# Patient Record
Sex: FEMALE | Race: WHITE | NOT HISPANIC OR LATINO
[De-identification: names, ages, dates, MRNs, and addresses within clinical notes are randomized per-mention and may not be internally consistent; named-entity substitution may affect disease eponyms.]

---

## 2019-02-19 ENCOUNTER — APPOINTMENT (OUTPATIENT)
Dept: OBGYN | Facility: CLINIC | Age: 64
End: 2019-02-19
Payer: MEDICARE

## 2019-02-19 VITALS
HEIGHT: 64 IN | WEIGHT: 182 LBS | SYSTOLIC BLOOD PRESSURE: 116 MMHG | DIASTOLIC BLOOD PRESSURE: 68 MMHG | BODY MASS INDEX: 31.07 KG/M2

## 2019-02-19 DIAGNOSIS — Z01.419 ENCOUNTER FOR GYNECOLOGICAL EXAMINATION (GENERAL) (ROUTINE) W/OUT ABNORMAL FINDINGS: ICD-10-CM

## 2019-02-19 DIAGNOSIS — R92.2 INCONCLUSIVE MAMMOGRAM: ICD-10-CM

## 2019-02-19 PROCEDURE — G0101: CPT

## 2019-02-19 PROCEDURE — 82270 OCCULT BLOOD FECES: CPT

## 2019-02-20 PROBLEM — Z01.419 WELL WOMAN EXAM WITH ROUTINE GYNECOLOGICAL EXAM: Status: ACTIVE | Noted: 2019-02-20

## 2019-02-20 NOTE — HISTORY OF PRESENT ILLNESS
[Last Mammogram ___] : Last Mammogram was [unfilled] [Last Colonoscopy ___] : Last colonoscopy [unfilled] [Last Pap ___] : Last cervical pap smear was [unfilled]

## 2019-02-20 NOTE — PHYSICAL EXAM
[Awake] : awake [Alert] : alert [Soft] : soft [Oriented x3] : oriented to person, place, and time [No Lesions] : no genitalia lesions [Labia Majora] : labia major [Labia Minora] : labia minora [Normal] : clitoris [No Bleeding] : there was no active vaginal bleeding [Pap Obtained] : a Pap smear was performed [Normal Position] : in a normal position [Uterine Adnexae] : were not tender and not enlarged [Acute Distress] : no acute distress [Mass] : no breast mass [Nipple Discharge] : no nipple discharge [Axillary LAD] : no axillary lymphadenopathy [Tender] : non tender [Tenderness] : nontender [Enlarged ___ wks] : not enlarged [Mass ___ cm] : no uterine mass was palpated [Adnexa Tenderness] : were not tender [Ovarian Mass (___ Cm)] : there were no adnexal masses [Occult Blood] : occult blood test from digital rectal exam was negative [External Hemorrhoid] : no external hemorrhoids [FreeTextEntry5] : stenotic

## 2019-02-21 LAB — HPV HIGH+LOW RISK DNA PNL CVX: NOT DETECTED

## 2019-02-25 LAB — CYTOLOGY CVX/VAG DOC THIN PREP: NORMAL

## 2019-07-16 ENCOUNTER — FORM ENCOUNTER (OUTPATIENT)
Age: 64
End: 2019-07-16

## 2019-07-17 ENCOUNTER — OUTPATIENT (OUTPATIENT)
Dept: OUTPATIENT SERVICES | Facility: HOSPITAL | Age: 64
LOS: 1 days | End: 2019-07-17
Payer: MEDICARE

## 2019-07-17 ENCOUNTER — TRANSCRIPTION ENCOUNTER (OUTPATIENT)
Age: 64
End: 2019-07-17

## 2019-07-17 ENCOUNTER — APPOINTMENT (OUTPATIENT)
Dept: MRI IMAGING | Facility: IMAGING CENTER | Age: 64
End: 2019-07-17
Payer: MEDICARE

## 2019-07-17 ENCOUNTER — APPOINTMENT (OUTPATIENT)
Dept: NEUROSURGERY | Facility: CLINIC | Age: 64
End: 2019-07-17
Payer: MEDICARE

## 2019-07-17 DIAGNOSIS — D32.9 BENIGN NEOPLASM OF MENINGES, UNSPECIFIED: ICD-10-CM

## 2019-07-17 PROCEDURE — 70551 MRI BRAIN STEM W/O DYE: CPT

## 2019-07-17 PROCEDURE — 99204 OFFICE O/P NEW MOD 45 MIN: CPT

## 2019-07-17 PROCEDURE — 70551 MRI BRAIN STEM W/O DYE: CPT | Mod: 26

## 2019-07-18 VITALS
DIASTOLIC BLOOD PRESSURE: 94 MMHG | HEART RATE: 108 BPM | OXYGEN SATURATION: 99 % | SYSTOLIC BLOOD PRESSURE: 129 MMHG | RESPIRATION RATE: 16 BRPM

## 2019-07-18 NOTE — PHYSICAL EXAM
[General Appearance - Alert] : alert [General Appearance - In No Acute Distress] : in no acute distress [Oriented To Time, Place, And Person] : oriented to person, place, and time [Impaired Insight] : insight and judgment were intact [Affect] : the affect was normal [Person] : oriented to person [Place] : oriented to place [Time] : oriented to time [Short Term Intact] : short term memory intact [Remote Intact] : remote memory intact [Span Intact] : the attention span was normal [Concentration Intact] : normal concentrating ability [Fluency] : fluency intact [Comprehension] : comprehension intact [Current Events] : adequate knowledge of current events [Past History] : adequate knowledge of personal past history [Vocabulary] : adequate range of vocabulary [Cranial Nerves Optic (II)] : visual acuity intact bilaterally,  pupils equal round and reactive to light [Cranial Nerves Oculomotor (III)] : extraocular motion intact [Cranial Nerves Trigeminal (V)] : facial sensation intact symmetrically [Cranial Nerves Facial (VII)] : face symmetrical [Cranial Nerves Vestibulocochlear (VIII)] : hearing was intact bilaterally [Cranial Nerves Glossopharyngeal (IX)] : tongue and palate midline [Cranial Nerves Accessory (XI - Cranial And Spinal)] : head turning and shoulder shrug symmetric [Cranial Nerves Hypoglossal (XII)] : there was no tongue deviation with protrusion [Motor Strength] : muscle strength was normal in all four extremities [No Muscle Atrophy] : normal bulk in all four extremities [Motor Handedness Left-Handed] : the patient is left hand dominant [Sensation Tactile Decrease] : light touch was intact [Balance] : balance was intact [2+] : Patella left 2+ [Auscultation Breath Sounds / Voice Sounds] : lungs were clear to auscultation bilaterally [Heart Rate And Rhythm] : heart rate was normal and rhythm regular [Heart Sounds] : normal S1 and S2 [Heart Sounds Gallop] : no gallops [Murmurs] : no murmurs [Heart Sounds Pericardial Friction Rub] : no pericardial rub [Full Pulse] : the pedal pulses are present [Edema] : there was no peripheral edema [Bowel Sounds] : normal bowel sounds [Abdomen Soft] : soft [Abdomen Tenderness] : non-tender [Abdomen Mass (___ Cm)] : no abdominal mass palpated [No CVA Tenderness] : no ~M costovertebral angle tenderness [No Spinal Tenderness] : no spinal tenderness [Abnormal Walk] : normal gait [Nail Clubbing] : no clubbing  or cyanosis of the fingernails [Musculoskeletal - Swelling] : no joint swelling seen [Motor Tone] : muscle strength and tone were normal [Skin Color & Pigmentation] : normal skin color and pigmentation [Skin Turgor] : normal skin turgor [] : no rash [Past-pointing] : there was no past-pointing [Tremor] : no tremor present [FreeTextEntry1] : High mid cranial mass, nonmobile, nontender, hard, no soft areas.

## 2019-07-18 NOTE — DATA REVIEWED
[de-identified] : \par \par EXAM: MR BRAIN \par \par \par PROCEDURE DATE: 07/17/2019 \par \par \par \par INTERPRETATION: Non-contrast MRI of the brain. \par \par CLINICAL INDICATION: Follow-up meningioma \par \par TECHNIQUE: Multiplanar, multisequence MR images of the brain were obtained \par without the administration of intravenous contrast. \par \par COMPARISON: Brain MRI dated 2/14/2015 \par \par FINDINGS:There has been increase in size of a bifrontal midline meningioma \par now measuring 4.3 cm AP x 2.3 cm cc x 2.7 cm transverse as compared with 2.5 \par x 1.3 x 1.7 cm on the prior study. There is a left frontal craniotomy. There \par is new soft tissue along the outer table of the calvarium at the level of \par the craniotomy defect. A marker is noted to be present in this location \par corresponding to a palpable abnormality. The soft tissue measures 0.8 x 2.1 \par x 1.5 cm. Is uncertain whether this abnormal soft tissue is bridging through \par the craniotomy defect without contrast. \par \par The meningioma is located along the course of the superior sagittal sinus \par and there is no identifiable flow at the level of the meningioma. There is \par normal flow-related signal along the posterior superior sagittal sinus. \par \par There is similar edema and encephalomalacia within the left frontal lobe. \par \par No acute hemorrhage, infarct hydrocephalus or midline shift is present. \par \par The orbits, paranasal sinuses and tympanomastoid cavities are free of acute \par disease. \par \par Impression: \par \par Increasing size of midline frontal meningioma with involvement of the \par superior sagittal sinus. New soft tissue along the outer table of the \par calvarium at the level of the craniotomy defect likely represents a \par meningioma spreading through the craniotomy defect.. \par \par The results of this examination were discussed with Dr. Reyes at 4:26 PM \par on 7/17 2019 \par \par \par \par \par \par \par \par \par \par \par \par \par \par \par \par ALTA SANCHEZ M.D., ATTENDING RADIOLOGIST \par This document has been electronically signed. Jul 17 2019 4:27PM

## 2019-07-18 NOTE — REASON FOR VISIT
[New Patient Visit] : a new patient visit [Spouse] : spouse [Family Member] : family member [FreeTextEntry1] : recurrent meningioma

## 2019-07-18 NOTE — PLAN
[FreeTextEntry1] : Discussion:\par - Reviewed MRI results. Recurrent meningioma.\par - Treatment options reviewed with patient,  and daughter.\par 1. Observations, MRI with close follow-up with clinical exam. Patient currently asymptomatic reserving option to treat surgically if symptoms present.\par 2. Stereotactic radiosurgery- is not recommended as a solo treatment.\par 3. Recommended treatment is craniotomy for surgical resection of the meningioma. MRV preoperative will determine is GTR is a possibility. If venous blood flow is absent complete resection would be feasible and not addition treatment would most likely not be necessary. If venous blood flow is present, residual tumor tissue proximal to the vein would be left as to not compromise circulation. This tissue would then be treated with Gamma knife approximately 6 weeks post-operative to provide long term tumor control.\par Surgical experience was reviewed. PST, medical clearance and imaging was reviewed. LOS approximately 5 days with discharge to inpatient rehab for several weeks or possibly home. This would be determined after surgery. Preoperative imaging synaptiv, functional MRI and MRV will be coordinated for surgical planning.\par Risks, benefits and alternatives were discussed at length. All questions were answered.\par Contact information provided for any additional questions.\par Patient is cleared without limitations for travel to [facility] next week for a two week vacation.

## 2019-07-18 NOTE — HISTORY OF PRESENT ILLNESS
[> 3 months] : more  than 3 months [FreeTextEntry1] : "bump on the top of head that is getting larger." [de-identified] : Patient with resection of a midline frontal meningioma 11/11/2003 by Dr. KIRILL Davis.\par She had recovered well and had been followed radiographically for 12 years without significant growth.\par Last MRI 2/14/2015. MRI 7/17/19 with increased size of meningioma eroding through the calvarium.\par Patient has been asymptomatic. Patient is left handed. She denies headaches, dizziness, weakness, no speech or language difficulties. She does not have any symptoms like prior to the original surgery- seizures with speech arrest. Enlarging growth in the calvarium was noticed by the patient's hairdresser.\par She was seen by her dermatologist.\par \par She works full time as a , , adult children, she lives in a private home with stairs. She is independent in ADLs.\par \par PCP: Destiny Caldera- [place]\par Psychiatrist: Martinez Mcclellan- [place]\par \par \par

## 2019-08-12 ENCOUNTER — FORM ENCOUNTER (OUTPATIENT)
Age: 64
End: 2019-08-12

## 2019-08-13 ENCOUNTER — OUTPATIENT (OUTPATIENT)
Dept: OUTPATIENT SERVICES | Facility: HOSPITAL | Age: 64
LOS: 1 days | End: 2019-08-13
Payer: MEDICARE

## 2019-08-13 ENCOUNTER — APPOINTMENT (OUTPATIENT)
Dept: MRI IMAGING | Facility: HOSPITAL | Age: 64
End: 2019-08-13

## 2019-08-13 DIAGNOSIS — I67.1 CEREBRAL ANEURYSM, NONRUPTURED: ICD-10-CM

## 2019-08-13 DIAGNOSIS — R07.9 CHEST PAIN, UNSPECIFIED: ICD-10-CM

## 2019-08-13 DIAGNOSIS — D32.9 BENIGN NEOPLASM OF MENINGES, UNSPECIFIED: ICD-10-CM

## 2019-08-13 PROCEDURE — 70553 MRI BRAIN STEM W/O & W/DYE: CPT

## 2019-08-13 PROCEDURE — 70553 MRI BRAIN STEM W/O & W/DYE: CPT | Mod: 26

## 2019-08-13 PROCEDURE — A9585: CPT

## 2019-08-13 PROCEDURE — 70546 MR ANGIOGRAPH HEAD W/O&W/DYE: CPT

## 2019-08-13 PROCEDURE — 70555 FMRI BRAIN BY PHYS/PSYCH: CPT

## 2019-08-22 ENCOUNTER — APPOINTMENT (OUTPATIENT)
Dept: NEUROSURGERY | Facility: CLINIC | Age: 64
End: 2019-08-22
Payer: MEDICARE

## 2019-08-22 PROCEDURE — 99214 OFFICE O/P EST MOD 30 MIN: CPT

## 2019-09-05 ENCOUNTER — APPOINTMENT (OUTPATIENT)
Dept: NEUROSURGERY | Facility: CLINIC | Age: 64
End: 2019-09-05
Payer: MEDICARE

## 2019-09-05 VITALS
WEIGHT: 170 LBS | TEMPERATURE: 98 F | RESPIRATION RATE: 18 BRPM | BODY MASS INDEX: 29.02 KG/M2 | OXYGEN SATURATION: 99 % | DIASTOLIC BLOOD PRESSURE: 87 MMHG | HEIGHT: 64 IN | SYSTOLIC BLOOD PRESSURE: 124 MMHG | HEART RATE: 98 BPM

## 2019-09-05 PROCEDURE — 99213 OFFICE O/P EST LOW 20 MIN: CPT

## 2019-09-05 NOTE — PHYSICAL EXAM
[General Appearance - Alert] : alert [General Appearance - In No Acute Distress] : in no acute distress [Person] : oriented to person [Place] : oriented to place [Time] : oriented to time [Motor Strength] : muscle strength was normal in all four extremities [Involuntary Movements] : no involuntary movements were seen [Balance] : balance was intact [] : no respiratory distress [Abnormal Walk] : normal gait

## 2019-09-05 NOTE — REASON FOR VISIT
[New Patient Visit] : a new patient visit [Referred By: _________] : Patient was referred by JOSHUA [Spouse] : spouse

## 2019-09-09 NOTE — ASSESSMENT
[FreeTextEntry1] : Impression: 64yr old female with bifrontal midline meningioma \par \par Plan: \par Reviewed patients MRI brain which demonstrates the presence of a bifrontal meningioma \par Discussed the risks and benefits of cerebral angiography and embolization for the meningioma prior to surgical resection. \par Patient wishes to proceed with embolization September 23, 2019.

## 2019-09-09 NOTE — HISTORY OF PRESENT ILLNESS
[de-identified] : Macey Fajardo is a 64yr old female here for a new patient visit. She has a cerebral meningioma that was resect by Dr. Davis in 2003. She then started to see Dr. Reyes for the meningioma. A recent MRI brain done July 2019 noted the meningioma has returned and she is scheduled for surgical resection of the meningoma September 24, 2019. SHe is here to discusses cerebral angiography and embolization of the meningioma the day prior to her surgery. \par \par Forence Baraselli = PCP

## 2019-09-09 NOTE — RESULTS/DATA
[FreeTextEntry1] : EXAM: MR BRAIN WAW IC \par \par EXAM: MR BRAIN FUNCTIONAL W MD PHD \par \par EXAM: MR VENOGRAM BRAIN WAW IC \par \par \par PROCEDURE DATE: 08/13/2019 \par \par \par \par \par INTERPRETATION: \par CLINICAL INDICATION: Preop evaluation of midline frontal meningioma \par \par \par Magnetic resonance imaging of the brain was carried out with transaxial \par SPGR, FLAIR, fast spin echo T2 weighted images, axial susceptibility \par weighted series, diffusion weighted series and sagittal T1 weighted series \par on a 3.0 Letitia magnet. Post contrast axial, coronal and sagittal T1 weighted \par images were obtained. 7.5 cc of Gadavist were intravenously injected, 0 cc \par were discarded. Sagittal postcontrast MR venography was obtained. Images \par were obtained using the AMT protocol. \par \par Functional imaging was also obtained using tasks for motor as well as speech \par evaluation. \par \par Comparison is made with the prior MRI of 7/17/2019. \par \par There is no significant change since the prior examination. \par \par A large enhancing midline frontal meningioma is identified which extends \par through frontal calvarium to the subcutaneous tissues. This mass also \par extends into the mid portion of the superior sagittal sinus. The proximal \par and distal superior sagittal sinus are patent. The mass measures 4 cm in AP \par diameter x 2.8 cm transversely x 2.4 cm in craniocaudal diameter. There is \par mild encephalomalacia and gliosis in the high left parasagittal frontal \par region subjacent to a craniotomy suggesting prior postoperative changes. \par \par Although unchanged since the prior examination 7/17/2019, the mass is new \par compared with 3/17/2008 and is larger when compared with 2/14/2015. \par \par \par The ventricles and sulci are otherwise normal. No acute infarcts or \par hemorrhage are identified. Left after contrast infusion there is normal \par intracranial vascular enhancement. \par \par The right transverse sinus is dominant. The inferior sagittal sinus, \par internal cerebral veins, vein of Raphael, straight sinus, transverse sinuses, \par sigmoid sinuses and internal jugular veins are normal. Flow in the superior \par sagittal sinus proximal and distal to the meningioma is visualized. Cortical \par veins are patent proximal and distal to the mass. \par \par \par Functional MRI imaging was obtained with tasks for motor and speech \par identification. Motor tasks of finger tapping and foot motion demonstrate \par the motor strip which is posterior to the last. Speech tasks consisting of \par word generation, verb generation, sentence completion and object naming \par demonstrate bilateral frontal activation. \par \par \par \par \par Impression: Images obtained using the Synaptive protocol. Large anterior \par parasagittal meningioma measuring 4.0 x 2.8 x 2.4 cm in diameter extending \par through the superior sagittal sinus and calvarium into the subcutaneous soft \par tissues. The superior sagittal sinus is patent proximal and distal to the \par mass. \par \par Functional imaging as above. \par \par \par \par \par \par \par \par \par \par JOSE ZHANG M.D., ATTENDING RADIOLOGIST \par This document has been electronically signed. Aug 14 2019 5:55PM \par \par \par \par

## 2019-09-10 ENCOUNTER — OUTPATIENT (OUTPATIENT)
Dept: OUTPATIENT SERVICES | Facility: HOSPITAL | Age: 64
LOS: 1 days | End: 2019-09-10
Payer: MEDICARE

## 2019-09-10 VITALS
WEIGHT: 177.03 LBS | HEIGHT: 64 IN | OXYGEN SATURATION: 97 % | SYSTOLIC BLOOD PRESSURE: 118 MMHG | TEMPERATURE: 99 F | RESPIRATION RATE: 14 BRPM | HEART RATE: 94 BPM | DIASTOLIC BLOOD PRESSURE: 84 MMHG

## 2019-09-10 DIAGNOSIS — Z98.890 OTHER SPECIFIED POSTPROCEDURAL STATES: Chronic | ICD-10-CM

## 2019-09-10 DIAGNOSIS — Z29.9 ENCOUNTER FOR PROPHYLACTIC MEASURES, UNSPECIFIED: ICD-10-CM

## 2019-09-10 DIAGNOSIS — D32.9 BENIGN NEOPLASM OF MENINGES, UNSPECIFIED: ICD-10-CM

## 2019-09-10 DIAGNOSIS — Z01.818 ENCOUNTER FOR OTHER PREPROCEDURAL EXAMINATION: ICD-10-CM

## 2019-09-10 DIAGNOSIS — Z98.1 ARTHRODESIS STATUS: Chronic | ICD-10-CM

## 2019-09-10 DIAGNOSIS — F41.8 OTHER SPECIFIED ANXIETY DISORDERS: ICD-10-CM

## 2019-09-10 LAB
BLD GP AB SCN SERPL QL: NEGATIVE — SIGNIFICANT CHANGE UP
BUN SERPL-MCNC: 16 MG/DL — SIGNIFICANT CHANGE UP (ref 7–23)
CALCIUM SERPL-MCNC: 10 MG/DL — SIGNIFICANT CHANGE UP (ref 8.4–10.5)
CHLORIDE SERPL-SCNC: 101 MMOL/L — SIGNIFICANT CHANGE UP (ref 96–108)
CO2 SERPL-SCNC: 25 MMOL/L — SIGNIFICANT CHANGE UP (ref 22–31)
CREAT SERPL-MCNC: 1.11 MG/DL — SIGNIFICANT CHANGE UP (ref 0.5–1.3)
GLUCOSE SERPL-MCNC: 100 MG/DL — HIGH (ref 70–99)
HCT VFR BLD CALC: 42.8 % — SIGNIFICANT CHANGE UP (ref 34.5–45)
HGB BLD-MCNC: 13.7 G/DL — SIGNIFICANT CHANGE UP (ref 11.5–15.5)
MCHC RBC-ENTMCNC: 27.9 PG — SIGNIFICANT CHANGE UP (ref 27–34)
MCHC RBC-ENTMCNC: 32 GM/DL — SIGNIFICANT CHANGE UP (ref 32–36)
MCV RBC AUTO: 87.2 FL — SIGNIFICANT CHANGE UP (ref 80–100)
MRSA PCR RESULT.: SIGNIFICANT CHANGE UP
PLATELET # BLD AUTO: 298 K/UL — SIGNIFICANT CHANGE UP (ref 150–400)
POTASSIUM SERPL-MCNC: 3.8 MMOL/L — SIGNIFICANT CHANGE UP (ref 3.5–5.3)
POTASSIUM SERPL-SCNC: 3.8 MMOL/L — SIGNIFICANT CHANGE UP (ref 3.5–5.3)
RBC # BLD: 4.91 M/UL — SIGNIFICANT CHANGE UP (ref 3.8–5.2)
RBC # FLD: 12 % — SIGNIFICANT CHANGE UP (ref 10.3–14.5)
RH IG SCN BLD-IMP: POSITIVE — SIGNIFICANT CHANGE UP
S AUREUS DNA NOSE QL NAA+PROBE: SIGNIFICANT CHANGE UP
SODIUM SERPL-SCNC: 140 MMOL/L — SIGNIFICANT CHANGE UP (ref 135–145)
WBC # BLD: 4.95 K/UL — SIGNIFICANT CHANGE UP (ref 3.8–10.5)
WBC # FLD AUTO: 4.95 K/UL — SIGNIFICANT CHANGE UP (ref 3.8–10.5)

## 2019-09-10 PROCEDURE — 86850 RBC ANTIBODY SCREEN: CPT

## 2019-09-10 PROCEDURE — G0463: CPT

## 2019-09-10 PROCEDURE — 87641 MR-STAPH DNA AMP PROBE: CPT

## 2019-09-10 PROCEDURE — 80048 BASIC METABOLIC PNL TOTAL CA: CPT

## 2019-09-10 PROCEDURE — 85027 COMPLETE CBC AUTOMATED: CPT

## 2019-09-10 PROCEDURE — 87640 STAPH A DNA AMP PROBE: CPT

## 2019-09-10 PROCEDURE — 86900 BLOOD TYPING SEROLOGIC ABO: CPT

## 2019-09-10 PROCEDURE — 86901 BLOOD TYPING SEROLOGIC RH(D): CPT

## 2019-09-10 NOTE — H&P PST ADULT - PSYCHIATRIC COMMENTS
admitted to suicidal ideation last week after being told surgery was necessary, was seen by psychiatrist twice since last week, states she is not feeling suicidal at this time Goes to psychiatrist 1x/week and prn, last seen 9/9/19

## 2019-09-10 NOTE — H&P PST ADULT - HISTORY OF PRESENT ILLNESS
Mrs. Fajardo is a 64 year old woman with PMH HLD, thyroid nodule s/p thyroidectomy 1999, osteoarthritis s/p spinal fusion in 2003 and gel injections to both knees and brain hemangioma s/p resection in 2003 who noted swelling on top of her head and went to PCP then neurosurgeon now scheduled for cerebral angiography with pipeline stent on 9/23/19 followed by midline craniotomy for tumor resection the following day on 9/24/19.  She denies headaches, dizziness or pain upon palpation.  She is very anxious about the procedure and felt suicidal last week but has been seen by her psychiatrist twice since and will continue regularly scheduled appointments with her. Mrs. Fajardo is a 64 year old woman with PMH HLD, thyroid nodule s/p thyroidectomy 1999, osteoarthritis s/p spinal fusion in 2003 and gel injections to both knees and brain hemangioma s/p resection in 2003 who noted swelling on top of her head and went to PCP then neurosurgeon now scheduled for cerebral angiography with pipeline stent on 9/23/19 followed by midline craniotomy for tumor resection the following day on 9/24/19.  She denies headaches, dizziness or pain upon palpation.  She is very anxious about the procedure and felt suicidal last week but has been seen by her psychiatrist twice since and will continue regularly scheduled appointments with her.    9/11/19  Spoke with Dr. Mcclellan, her psychiatrist re: letter stating that she is stable for planned procedure on 9/23/19, he will fax over letter.

## 2019-09-10 NOTE — H&P PST ADULT - HEALTH CARE MAINTENANCE
Flu vaccine annually  Pneum vaccine up to date  Shingles vaccine done  Mammo 2/2019  Pap Smear 2/2019

## 2019-09-10 NOTE — H&P PST ADULT - ASSESSMENT
CAPRINI SCORE [CLOT]    AGE RELATED RISK FACTORS                                                       MOBILITY RELATED FACTORS  [ ] Age 41-60 years                                            (1 Point)                  [ ] Bed rest                                                        (1 Point)  [ x] Age: 61-74 years                                           (2 Points)                 [ ] Plaster cast                                                   (2 Points)  [ ] Age= 75 years                                              (3 Points)                 [ ] Bed bound for more than 72 hours                 (2 Points)    DISEASE RELATED RISK FACTORS                                               GENDER SPECIFIC FACTORS  [ ] Edema in the lower extremities                       (1 Point)                  [ ] Pregnancy                                                     (1 Point)  [ ] Varicose veins                                               (1 Point)                  [ ] Post-partum < 6 weeks                                   (1 Point)             [x ] BMI > 25 Kg/m2                                            (1 Point)                  [ ] Hormonal therapy  or oral contraception          (1 Point)                 [ ] Sepsis (in the previous month)                        (1 Point)                  [ ] History of pregnancy complications                 (1 point)  [ ] Pneumonia or serious lung disease                                               [ ] Unexplained or recurrent                     (1 Point)           (in the previous month)                               (1 Point)  [ ] Abnormal pulmonary function test                     (1 Point)                 SURGERY RELATED RISK FACTORS  [ ] Acute myocardial infarction                              (1 Point)                 [ ]  Section                                             (1 Point)  [ ] Congestive heart failure (in the previous month)  (1 Point)               [ ] Minor surgery                                                  (1 Point)   [ ] Inflammatory bowel disease                             (1 Point)                 [ ] Arthroscopic surgery                                        (2 Points)  [ ] Central venous access                                      (2 Points)                [x ] General surgery lasting more than 45 minutes   (2 Points)       [ ] Stroke (in the previous month)                          (5 Points)               [ ] Elective arthroplasty                                         (5 Points)                                                                                                                                               HEMATOLOGY RELATED FACTORS                                                 TRAUMA RELATED RISK FACTORS  [ ] Prior episodes of VTE                                     (3 Points)                 [ ] Fracture of the hip, pelvis, or leg                       (5 Points)  [ ] Positive family history for VTE                         (3 Points)                 [ ] Acute spinal cord injury (in the previous month)  (5 Points)  [ ] Prothrombin 57040 A                                     (3 Points)                 [ ] Paralysis  (less than 1 month)                             (5 Points)  [ ] Factor V Leiden                                             (3 Points)                  [ ] Multiple Trauma within 1 month                        (5 Points)  [ ] Lupus anticoagulants                                     (3 Points)                                                           [ ] Anticardiolipin antibodies                               (3 Points)                                                       [ ] High homocysteine in the blood                      (3 Points)                                             [ ] Other congenital or acquired thrombophilia      (3 Points)                                                [ ] Heparin induced thrombocytopenia                  (3 Points)                                          Total Score [   5       ]

## 2019-09-10 NOTE — H&P PST ADULT - NSICDXPASTMEDICALHX_GEN_ALL_CORE_FT
PAST MEDICAL HISTORY:  Depression with anxiety     HLD (hyperlipidemia)     OA (osteoarthritis)     Thyroid nodule 1999

## 2019-09-10 NOTE — H&P PST ADULT - NSICDXPASTSURGICALHX_GEN_ALL_CORE_FT
PAST SURGICAL HISTORY:  S/P lumbar spinal fusion 2003    S/P resection of meningioma 2003    S/P thyroidectomy 1999 for nodule

## 2019-09-10 NOTE — H&P PST ADULT - NSICDXFAMILYHX_GEN_ALL_CORE_FT
FAMILY HISTORY:  FH: lung cancer, Mother  in her 50s  FH: premature coronary heart disease, Father  at 55 from MI

## 2019-09-10 NOTE — H&P PST ADULT - NSICDXPROBLEM_GEN_ALL_CORE_FT
PROBLEM DIAGNOSES  Problem: Benign neoplasm of meninges, unspecified  Assessment and Plan: Scheduled for Pipeline embolization 9/23, scheduled for Midline crani for tumor resection 9/24 with Dr. Reyes.  Preop instructions given.  Instructed to continue medications as prescribed, may take day of procedure with small sips of water.  Medical evaluation pending 9/13/19  Chlorhexidine to affected area preop    Problem: Prophylactic measure  Assessment and Plan: The Caprini score indicates this patient is at risk for a VTE event (score 3-5).  Most surgical patients in this group would benefit from pharmacologic prophylaxis.  The surgical team will determine the balance between VTE risk and bleeding risk      Problem: Depression with anxiety  Assessment and Plan: Instructed to continue with therapy sessions with Psychiatrist and to take meds as prescribed.

## 2019-09-10 NOTE — H&P PST ADULT - NSANTHOSAYNRD_GEN_A_CORE
No. STEFANY screening performed.  STOP BANG Legend: 0-2 = LOW Risk; 3-4 = INTERMEDIATE Risk; 5-8 = HIGH Risk

## 2019-09-18 PROBLEM — M19.90 UNSPECIFIED OSTEOARTHRITIS, UNSPECIFIED SITE: Chronic | Status: ACTIVE | Noted: 2019-09-10

## 2019-09-18 PROBLEM — E04.1 NONTOXIC SINGLE THYROID NODULE: Chronic | Status: ACTIVE | Noted: 2019-09-10

## 2019-09-18 PROBLEM — E78.5 HYPERLIPIDEMIA, UNSPECIFIED: Chronic | Status: ACTIVE | Noted: 2019-09-10

## 2019-09-18 PROBLEM — F41.8 OTHER SPECIFIED ANXIETY DISORDERS: Chronic | Status: ACTIVE | Noted: 2019-09-10

## 2019-09-23 ENCOUNTER — TRANSCRIPTION ENCOUNTER (OUTPATIENT)
Age: 64
End: 2019-09-23

## 2019-09-23 ENCOUNTER — APPOINTMENT (OUTPATIENT)
Dept: NEUROSURGERY | Facility: HOSPITAL | Age: 64
End: 2019-09-23
Payer: MEDICARE

## 2019-09-23 ENCOUNTER — INPATIENT (INPATIENT)
Facility: HOSPITAL | Age: 64
LOS: 8 days | Discharge: HOME | DRG: 25 | End: 2019-10-02
Attending: NEUROLOGICAL SURGERY | Admitting: NEUROLOGICAL SURGERY
Payer: MEDICARE

## 2019-09-23 VITALS — HEART RATE: 80 BPM | RESPIRATION RATE: 16 BRPM | OXYGEN SATURATION: 95 % | TEMPERATURE: 97 F

## 2019-09-23 DIAGNOSIS — Z98.890 OTHER SPECIFIED POSTPROCEDURAL STATES: Chronic | ICD-10-CM

## 2019-09-23 DIAGNOSIS — D32.9 BENIGN NEOPLASM OF MENINGES, UNSPECIFIED: ICD-10-CM

## 2019-09-23 DIAGNOSIS — Z98.1 ARTHRODESIS STATUS: Chronic | ICD-10-CM

## 2019-09-23 LAB
ALBUMIN SERPL ELPH-MCNC: 4.1 G/DL — SIGNIFICANT CHANGE UP (ref 3.3–5)
ALP SERPL-CCNC: 73 U/L — SIGNIFICANT CHANGE UP (ref 40–120)
ALT FLD-CCNC: 18 U/L — SIGNIFICANT CHANGE UP (ref 10–45)
ANION GAP SERPL CALC-SCNC: 9 MMOL/L — SIGNIFICANT CHANGE UP (ref 5–17)
APTT BLD: 140.5 SEC — CRITICAL HIGH (ref 27.5–36.3)
APTT BLD: 33.9 SEC — SIGNIFICANT CHANGE UP (ref 27.5–36.3)
AST SERPL-CCNC: 16 U/L — SIGNIFICANT CHANGE UP (ref 10–40)
BILIRUB DIRECT SERPL-MCNC: <0.1 MG/DL — SIGNIFICANT CHANGE UP (ref 0–0.2)
BILIRUB INDIRECT FLD-MCNC: >0.1 MG/DL — LOW (ref 0.2–1)
BILIRUB SERPL-MCNC: 0.2 MG/DL — SIGNIFICANT CHANGE UP (ref 0.2–1.2)
BUN SERPL-MCNC: 15 MG/DL — SIGNIFICANT CHANGE UP (ref 7–23)
CALCIUM SERPL-MCNC: 8.9 MG/DL — SIGNIFICANT CHANGE UP (ref 8.4–10.5)
CHLORIDE SERPL-SCNC: 103 MMOL/L — SIGNIFICANT CHANGE UP (ref 96–108)
CO2 SERPL-SCNC: 23 MMOL/L — SIGNIFICANT CHANGE UP (ref 22–31)
CREAT SERPL-MCNC: 1.04 MG/DL — SIGNIFICANT CHANGE UP (ref 0.5–1.3)
GLUCOSE SERPL-MCNC: 119 MG/DL — HIGH (ref 70–99)
HBA1C BLD-MCNC: 5.5 % — SIGNIFICANT CHANGE UP (ref 4–5.6)
HCT VFR BLD CALC: 39.7 % — SIGNIFICANT CHANGE UP (ref 34.5–45)
HGB BLD-MCNC: 13.4 G/DL — SIGNIFICANT CHANGE UP (ref 11.5–15.5)
INR BLD: 1.04 RATIO — SIGNIFICANT CHANGE UP (ref 0.88–1.16)
INR BLD: 1.11 RATIO — SIGNIFICANT CHANGE UP (ref 0.88–1.16)
MAGNESIUM SERPL-MCNC: 1.9 MG/DL — SIGNIFICANT CHANGE UP (ref 1.6–2.6)
MCHC RBC-ENTMCNC: 29.5 PG — SIGNIFICANT CHANGE UP (ref 27–34)
MCHC RBC-ENTMCNC: 33.6 GM/DL — SIGNIFICANT CHANGE UP (ref 32–36)
MCV RBC AUTO: 87.9 FL — SIGNIFICANT CHANGE UP (ref 80–100)
PHOSPHATE SERPL-MCNC: 1.7 MG/DL — LOW (ref 2.5–4.5)
PLATELET # BLD AUTO: 251 K/UL — SIGNIFICANT CHANGE UP (ref 150–400)
POTASSIUM SERPL-MCNC: 3.7 MMOL/L — SIGNIFICANT CHANGE UP (ref 3.5–5.3)
POTASSIUM SERPL-SCNC: 3.7 MMOL/L — SIGNIFICANT CHANGE UP (ref 3.5–5.3)
PROT SERPL-MCNC: 6.4 G/DL — SIGNIFICANT CHANGE UP (ref 6–8.3)
PROTHROM AB SERPL-ACNC: 11.9 SEC — SIGNIFICANT CHANGE UP (ref 10–12.9)
PROTHROM AB SERPL-ACNC: 12.7 SEC — SIGNIFICANT CHANGE UP (ref 10–12.9)
RBC # BLD: 4.52 M/UL — SIGNIFICANT CHANGE UP (ref 3.8–5.2)
RBC # FLD: 11.5 % — SIGNIFICANT CHANGE UP (ref 10.3–14.5)
SODIUM SERPL-SCNC: 135 MMOL/L — SIGNIFICANT CHANGE UP (ref 135–145)
WBC # BLD: 6.6 K/UL — SIGNIFICANT CHANGE UP (ref 3.8–10.5)
WBC # FLD AUTO: 6.6 K/UL — SIGNIFICANT CHANGE UP (ref 3.8–10.5)

## 2019-09-23 PROCEDURE — 36227 PLACE CATH XTRNL CAROTID: CPT | Mod: 50

## 2019-09-23 PROCEDURE — 61624 TCAT PERM OCCLS/EMBOLJ CNS: CPT

## 2019-09-23 PROCEDURE — 75894 X-RAYS TRANSCATH THERAPY: CPT | Mod: 26

## 2019-09-23 PROCEDURE — 70496 CT ANGIOGRAPHY HEAD: CPT | Mod: 26

## 2019-09-23 PROCEDURE — 75898 FOLLOW-UP ANGIOGRAPHY: CPT | Mod: 26

## 2019-09-23 PROCEDURE — 99292 CRITICAL CARE ADDL 30 MIN: CPT

## 2019-09-23 PROCEDURE — 99291 CRITICAL CARE FIRST HOUR: CPT

## 2019-09-23 PROCEDURE — 36224 PLACE CATH CAROTD ART: CPT | Mod: 50

## 2019-09-23 RX ORDER — ARIPIPRAZOLE 15 MG/1
2 TABLET ORAL DAILY
Refills: 0 | Status: DISCONTINUED | OUTPATIENT
Start: 2019-09-23 | End: 2019-09-23

## 2019-09-23 RX ORDER — ATORVASTATIN CALCIUM 80 MG/1
40 TABLET, FILM COATED ORAL AT BEDTIME
Refills: 0 | Status: DISCONTINUED | OUTPATIENT
Start: 2019-09-23 | End: 2019-10-02

## 2019-09-23 RX ORDER — CHLORHEXIDINE GLUCONATE 213 G/1000ML
1 SOLUTION TOPICAL
Refills: 0 | Status: DISCONTINUED | OUTPATIENT
Start: 2019-09-23 | End: 2019-09-26

## 2019-09-23 RX ORDER — INFLUENZA VIRUS VACCINE 15; 15; 15; 15 UG/.5ML; UG/.5ML; UG/.5ML; UG/.5ML
0.5 SUSPENSION INTRAMUSCULAR ONCE
Refills: 0 | Status: COMPLETED | OUTPATIENT
Start: 2019-09-23 | End: 2019-10-02

## 2019-09-23 RX ORDER — BUPROPION HYDROCHLORIDE 150 MG/1
300 TABLET, EXTENDED RELEASE ORAL DAILY
Refills: 0 | Status: DISCONTINUED | OUTPATIENT
Start: 2019-09-23 | End: 2019-09-23

## 2019-09-23 RX ORDER — BUPROPION HYDROCHLORIDE 150 MG/1
150 TABLET, EXTENDED RELEASE ORAL DAILY
Refills: 0 | Status: DISCONTINUED | OUTPATIENT
Start: 2019-09-23 | End: 2019-09-23

## 2019-09-23 RX ORDER — SODIUM CHLORIDE 9 MG/ML
1000 INJECTION INTRAMUSCULAR; INTRAVENOUS; SUBCUTANEOUS
Refills: 0 | Status: DISCONTINUED | OUTPATIENT
Start: 2019-09-23 | End: 2019-09-24

## 2019-09-23 RX ORDER — ARIPIPRAZOLE 15 MG/1
2 TABLET ORAL EVERY 24 HOURS
Refills: 0 | Status: DISCONTINUED | OUTPATIENT
Start: 2019-09-23 | End: 2019-10-02

## 2019-09-23 RX ORDER — CLONAZEPAM 1 MG
1 TABLET ORAL THREE TIMES A DAY
Refills: 0 | Status: DISCONTINUED | OUTPATIENT
Start: 2019-09-23 | End: 2019-09-26

## 2019-09-23 RX ORDER — DEXAMETHASONE 0.5 MG/5ML
4 ELIXIR ORAL EVERY 6 HOURS
Refills: 0 | Status: DISCONTINUED | OUTPATIENT
Start: 2019-09-23 | End: 2019-09-24

## 2019-09-23 RX ORDER — FOLIC ACID 0.8 MG
1 TABLET ORAL DAILY
Refills: 0 | Status: DISCONTINUED | OUTPATIENT
Start: 2019-09-23 | End: 2019-10-02

## 2019-09-23 RX ORDER — DEXAMETHASONE 0.5 MG/5ML
10 ELIXIR ORAL ONCE
Refills: 0 | Status: COMPLETED | OUTPATIENT
Start: 2019-09-23 | End: 2019-09-23

## 2019-09-23 RX ORDER — BUPROPION HYDROCHLORIDE 150 MG/1
300 TABLET, EXTENDED RELEASE ORAL EVERY 24 HOURS
Refills: 0 | Status: DISCONTINUED | OUTPATIENT
Start: 2019-09-23 | End: 2019-10-02

## 2019-09-23 RX ORDER — LEVOTHYROXINE SODIUM 125 MCG
100 TABLET ORAL DAILY
Refills: 0 | Status: DISCONTINUED | OUTPATIENT
Start: 2019-09-23 | End: 2019-09-27

## 2019-09-23 RX ORDER — DEXAMETHASONE 0.5 MG/5ML
10 ELIXIR ORAL ONCE
Refills: 0 | Status: DISCONTINUED | OUTPATIENT
Start: 2019-09-23 | End: 2019-09-23

## 2019-09-23 RX ADMIN — Medication 1 MILLIGRAM(S): at 21:34

## 2019-09-23 RX ADMIN — Medication 4 MILLIGRAM(S): at 23:42

## 2019-09-23 RX ADMIN — Medication 102 MILLIGRAM(S): at 19:02

## 2019-09-23 RX ADMIN — SODIUM CHLORIDE 70 MILLILITER(S): 9 INJECTION INTRAMUSCULAR; INTRAVENOUS; SUBCUTANEOUS at 11:14

## 2019-09-23 RX ADMIN — ATORVASTATIN CALCIUM 40 MILLIGRAM(S): 80 TABLET, FILM COATED ORAL at 21:34

## 2019-09-23 NOTE — DISCHARGE NOTE NURSING/CASE MANAGEMENT/SOCIAL WORK - PATIENT PORTAL LINK FT
You can access the FollowMyHealth Patient Portal offered by [facility] by registering at the following website: [email or web address]. By joining [employer]’s FollowMyHealth portal, you will also be able to view your health information using other applications (apps) compatible with our system.

## 2019-09-23 NOTE — DISCHARGE NOTE NURSING/CASE MANAGEMENT/SOCIAL WORK - NSDCPEPTSTRK_GEN_ALL_CORE
Risk factors for stroke/Call 911 for stroke/Need for follow up after discharge/Prescribed medications/Stroke education booklet/Signs and symptoms of stroke/Stroke support groups for patients, families, and friends/Stroke warning signs and symptoms

## 2019-09-23 NOTE — PROGRESS NOTE ADULT - ASSESSMENT
PAD#0 angio/embo of meningioma    pre op for OR/resection in am  suicidal ideation in the past, followed by outpt psych Dr. Mcclellan--continue recommended meds  decadron for cerebral edema  -140  encourage incentive spirometry as able  maintain euglycemia  cont synthroid  gorge op abxs   f/u ble duplex, high risk VTE due to meningioma

## 2019-09-23 NOTE — CHART NOTE - NSCHARTNOTEFT_GEN_A_CORE
CAPRINI SCORE [CLOT] Score on Admission for     AGE RELATED RISK FACTORS                                                       MOBILITY RELATED FACTORS  [ ] Age 41-60 years                                            (1 Point)                  [ ] Bed rest                                                        (1 Point)  [X ] Age: 61-74 years                                           (2 Points)                 [ ] Plaster cast                                                   (2 Points)  [ ] Age= 75 years                                              (3 Points)                 [ ] Bed bound for more than 72 hours                 (2 Points)    DISEASE RELATED RISK FACTORS                                               GENDER SPECIFIC FACTORS  [ ] Edema in the lower extremities                       (1 Point)                  [ ] Pregnancy                                                     (1 Point)  [ ] Varicose veins                                               (1 Point)                  [ ] Post-partum < 6 weeks                                   (1 Point)             [ X] BMI > 25 Kg/m2                                            (1 Point)                  [ ] Hormonal therapy  or oral contraception          (1 Point)                 [ ] Sepsis (in the previous month)                        (1 Point)                  [ ] History of pregnancy complications                 (1 point)  [ ] Pneumonia or serious lung disease                                               [ ] Unexplained or recurrent                     (1 Point)           (in the previous month)                               (1 Point)  [ ] Abnormal pulmonary function test                     (1 Point)                 SURGERY RELATED RISK FACTORS (include planned surgeries)  [ ] Acute myocardial infarction                              (1 Point)                 [ ]  Section                                             (1 Point)  [ ] Congestive heart failure (in the previous month)  (1 Point)         [ ] Minor surgery                                                  (1 Point)   [ ] Inflammatory bowel disease                             (1 Point)                 [ ] Arthroscopic surgery                                        (2 Points)  [ ] Central venous access                                      (2 Points)                [ X] General surgery lasting more than 45 minutes   (2 Points)       [ ] Stroke (in the previous month)                          (5 Points)               [ ] Elective arthroplasty                                         (5 Points)            [ X] current or past malignancy                              (2 Points)                                                                                                       HEMATOLOGY RELATED FACTORS                                                 TRAUMA RELATED RISK FACTORS  [ ] Prior episodes of VTE                                     (3 Points)                [ ] Fracture of the hip, pelvis, or leg                       (5 Points)  [ ] Positive family history for VTE                         (3 Points)                 [ ] Acute spinal cord injury (in the previous month)  (5 Points)  [ ] Prothrombin 80180 A                                     (3 Points)                 [ ] Paralysis  (less than 1 month)                             (5 Points)  [ ] Factor V Leiden                                             (3 Points)                  [ ] Multiple Trauma within 1 month                        (5 Points)  [ ] Lupus anticoagulants                                     (3 Points)                                                           [ ] Anticardiolipin antibodies                               (3 Points)                                                       [ ] High homocysteine in the blood                      (3 Points)                                             [ ] Other congenital or acquired thrombophilia      (3 Points)                                                [ ] Heparin induced thrombocytopenia                  (3 Points)                                          Total Score [   7       ]    Risk:  Very low 0   Low 1 to 2   Moderate 3 to 4   High =5       VTE Prophylasix Recommednations:  [X ] mechanical pneumatic compression devices                                      [ ] contraindicated: _____________________  [ ] chemo prophylasix                                                                                   [ X] contraindicated ____Fresh post op_________________    **** HIGH LIKELIHOOD DVT PRESENT ON ADMISSION  [ ] (please order LE dopplers within 24 hours of admission)

## 2019-09-23 NOTE — CHART NOTE - NSCHARTNOTEFT_GEN_A_CORE
Interventional Neuro- Radiology   Procedure Note PA-C    Procedure: Selective Cerebral Angiography   Pre- Procedure Diagnosis:  Post- Procedure Diagnosis:    : Dr Reji Meade  Fellow:    Nurse:  Radiologic Tech:  Anesthesiologist:  Sheath:    I/Os: EBL less than 10cc  IV fluids:     cc     Urine output     cc        Contrast Omnipaque 240      cc             Vitals: BP         HR      Spo2         Preliminary Report:  Using a 4 Bahraini sheath to the right groin under general sedation via  left vertebral artery,  left common carotid artery, left external carotid artey, right vertebral arter,  right common carotid artery, right external carotid artery  a selective cerebral angiography was performed and  demonstrates ________. ( Official note to follow).  Patient tolerated procedure well, hemodynamically stable, no change in neurological status compared to baseline.  Results discussed with neurosurgery, patient and patient's  family.  Right Groin sheath was removed,  manual compression held to hemostasis  for  21 minutes, no active bleeding, no hematoma, quick clot and safeguard balloon dressing applied at _____h. Interventional Neuro- Radiology   Procedure Note PA-C    Procedure: Selective Cerebral Angiography   Pre- Procedure Diagnosis: frontal meningioma   Post- Procedure Diagnosis:    : Dr Reji Meade  Fellow:    Felicia Dominguez     Nurse:                  Stacie Billingsley        Radiologic Tech: Gagandeep Barcenas  Anesthesiologist: Dr Huseyin Beatty  Sheath:    I/Os: EBL less than 10cc  IV fluids:     cc  Urine output     cc   Contrast Omnipaque 240             Vitals: BP         HR      Spo2         Preliminary Report:  Using a 5 Japanese sheath to the right groin under general sedation via left vertebral artery, left common carotid artery, left external carotid artery, right vertebral artery,  right common carotid artery, right external carotid artery  a selective cerebral angiography was performed and demonstrated                       Official note to follow).  Patient tolerated procedure well, hemodynamically stable, no change in neurological status compared to baseline. Results discussed with neurosurgery, patient and patient's family. Right groin sheath was removed, manual compression held to hemostasis for 20 minutes, no active bleeding, no hematoma, quick clot and safeguard balloon dressing applied Interventional Neuro- Radiology   Procedure Note PA-C    Procedure: Selective Cerebral Angiography and embolization of meningioma   Pre- Procedure Diagnosis: frontal meningioma   Post- Procedure Diagnosis:    : Dr Reji Meade  Fellow:    Dr Felicia Dominguez     Nurse:                    Stacie Billingsley RN      Radiologic Tech:   Gagandeep Barcenas LRT  Anesthesiologist:   Dr Huseyin Beatty  Sheath:                 4 Canadian Fubuki sheath     I/Os: EBL less than 10cc   IV fluids: 500cc  Urine output 200cc  Contrast Omnipaque 240  103cc            Vitals: /70        HR 72    Spo2 100%       Preliminary Report:  Using a 4 Canadian Fubki sheath to the right groin under general sedation via left vertebral artery, left internal carotid artery, left external carotid artery, right vertebral artery, right internal carotid artery, right external carotid artery a selective cerebral angiography was performed and demonstrated                       Official note to follow.  Patient tolerated procedure well, hemodynamically stable, no change in neurological status compared to baseline. Results discussed with neurosurgery, patient and patient's family. Right groin sheath was removed, manual compression held to hemostasis for 20 minutes, no active bleeding, no hematoma, quick clot and safeguard balloon dressing applied at 10:45am. Patient was transported to the recovery room on the second floor. NPO at midnight for OR for resection of meningioma. Interventional Neuro- Radiology   Procedure Note PA-C    Procedure: Selective Cerebral Angiography and embolization of meningioma   Pre- Procedure Diagnosis: frontal meningioma   Post- Procedure Diagnosis:    : Dr Reji Meade  Fellow:    Dr Felicia Dominguez     Nurse:                    Stacie Billingsley RN      Radiologic Tech:   Gagandeep Barcenas LRT  Anesthesiologist:   Dr Huseyin Beatty  Sheath:                  4 Turkish Fubuki sheath     I/Os: EBL less than 10cc   IV fluids: 500cc  Urine output 200cc  Contrast Omnipaque 240  103cc            Vitals: /70        HR 72    Spo2 100%       Preliminary Report:  Using a 4 Turkish Fubki sheath to the right groin under general sedation via left vertebral artery, left internal carotid artery, left external carotid artery, right vertebral artery, right internal carotid artery, right external carotid artery a selective cerebral angiography was performed and demonstrated                       Official note to follow.  Patient tolerated procedure well, hemodynamically stable, no change in neurological status compared to baseline. Results discussed with neurosurgery, patient and patient's family. Right groin sheath was removed, manual compression held to hemostasis for 20 minutes, no active bleeding, no hematoma, quick clot and safeguard balloon dressing applied at 10:45am. Patient was transported to the recovery room on the second floor. NPO at midnight for OR for resection of meningioma. Interventional Neuro- Radiology   Procedure Note PA-C    Procedure: Selective Cerebral Angiography and embolization of meningioma   Pre- Procedure Diagnosis: frontal meningioma   Post- Procedure Diagnosis:    : Dr Reji Meade  Fellow:     Dr Felicia Dominguez     Nurse:                    Stacie Billingsley RN      Radiologic Tech:   Gagandeep Barcenas LRT  Anesthesiologist:   Dr Huseyin Beatty  Sheath:                  4 Marshallese Fubuki sheath     I/Os: EBL less than 10cc   IV fluids: 500cc  Urine output 200cc  Contrast Omnipaque 240  103cc            Vitals: /70        HR 72    Spo2 100%       Preliminary Report:  Using a 4 Marshallese Fubki sheath to the right groin under general sedation via left vertebral artery, left internal carotid artery, left external carotid artery, right vertebral artery, right internal carotid artery, right external carotid artery a selective cerebral angiography was performed and demonstrated                       Official note to follow.  Patient tolerated procedure well, hemodynamically stable, no change in neurological status compared to baseline. Results discussed with neurosurgery, patient and patient's family. Right groin sheath was removed, manual compression held to hemostasis for 20 minutes, no active bleeding, no hematoma, quick clot and safeguard balloon dressing applied at 10:45am. Patient was transported to the recovery room on the second floor. NPO at midnight for OR for resection of meningioma. Interventional Neuro- Radiology   Procedure Note PA-C    Procedure: Selective Cerebral Angiography and embolization of meningioma with 3 coils and embospheres    Pre- Procedure Diagnosis: frontal meningioma   Post- Procedure Diagnosis: near total embolization of large anterior parasagittal meningioma     : Dr Reji Meade  Fellow:     Dr Felicia Dominguez   Resident: Dr Mateo Bowden    Nurse:                    Stacie Billingsley RN      Radiologic Tech:   Gagandeep Barcenas LRT  Anesthesiologist:   Dr Huseyin Beatty  Sheath:                 4 Croatian Fubuki sheath     I/Os: EBL less than 10cc   IV fluids: 500cc  Urine output 200cc  Contrast Omnipaque 240  103cc          Vitals: /70        HR 72    Spo2 100%     Preliminary Report:  Using a 4 Croatian Fubki sheath to the right groin under general sedation via left vertebral artery, left internal carotid artery, left external carotid artery, right vertebral artery, right internal carotid artery, right external carotid artery a selective cerebral angiography was performed and demonstrated a large anterior parasagittal meningioma. Patient underwent successful embolization of meningioma with 3 coils and Embospheres. Official note to follow.  Patient tolerated procedure well, hemodynamically stable, no change in neurological status compared to baseline. Results discussed with neurosurgery, patient and patient's family. Right groin sheath was removed, manual compression held to hemostasis for 20 minutes, no active bleeding, no hematoma, quick clot and safeguard balloon dressing applied at 10:45am. Patient was transported to the recovery room on the second floor. NPO at midnight for OR for resection of meningioma. Interventional Neuro- Radiology   Procedure Note PA-C    Procedure: Selective Cerebral Angiography and embolization of meningioma with 3 coils and embospheres    Pre- Procedure Diagnosis: frontal meningioma   Post- Procedure Diagnosis: near total embolization of large anterior parasagittal meningioma     : Dr Reji Meade  Fellow:     Dr Felicia Dominguez   Resident: Dr Mateo Bowden    Nurse:                    Stacie Billingsley RN      Radiologic Tech:   Gagandeep Barcenas LRT  Anesthesiologist:   Dr Huseyin Beatty  Sheath:                  4 Liberian Fubuki sheath     I/Os: EBL less than 10cc   IV fluids: 500cc  Urine output 200cc  Contrast Omnipaque 240  103cc          Vitals: /70        HR 72    Spo2 100%     Preliminary Report:  Using a 4 Liberian Fubki sheath to the right groin under general sedation via left vertebral artery, left internal carotid artery, left external carotid artery, right vertebral artery, right internal carotid artery, right external carotid artery a selective cerebral angiography was performed and demonstrated a large anterior parasagittal meningioma. Patient underwent successful embolization of meningioma with 3 coils and Embospheres. Official note to follow.  Patient tolerated procedure well, hemodynamically stable, no change in neurological status compared to baseline. Results discussed with neurosurgery, patient and patient's family. Right groin sheath was removed, manual compression held to hemostasis for 20 minutes, no active bleeding, no hematoma, quick clot and safeguard balloon dressing applied at 10:45am. Patient was transported to the recovery room on the second floor. NPO at midnight for OR for resection of meningioma.

## 2019-09-23 NOTE — PROGRESS NOTE ADULT - SUBJECTIVE AND OBJECTIVE BOX
SUMMARY: SUMMARY:  Mrs. Fajardo is a 64 year-old woman with history of hyperlipidemia, thyroid nodule status post thyroidectomy (1999), osteoarthritis status post spinal fusion (2003) and gel injections to both knees and brain hemangioma s/p resection in 2003 who noted swelling on top of her head and went to PCP then neurosurgeon now scheduled for cerebral angiography with pipeline stent on 9/23/19 followed by midline craniotomy for tumor resection the following day on 9/24/19.  She denies headaches, dizziness or pain upon palpation.  She is very anxious about the procedure and felt suicidal last week but has been seen by her psychiatrist twice since and will continue regularly scheduled appointments with her.    9/11/19  Spoke with Dr. Mcclellan, her psychiatrist re: letter stating that she is stable for planned procedure on 9/23/19, he will fax over letter. SUMMARY:  Mrs. Fajardo is a 64 year-old woman with history of hyperlipidemia, thyroid nodule status post thyroidectomy (1999), osteoarthritis status post spinal fusion (2003) and gel injections to both knees and meningioma status post resection (2003, at [place] with Dr. Nikita Davis) who noted swelling on top of her head  and was noted to have recurrent meningioma for which she underwent near complete vascular embolization on 9/23/19 and is planned for resection on 9/24/19. She reportedly has been very anxious about these procedures, even endorsing suicidal feelings, for which she has been regularly seeing her psychiatrist, Dr. Mcclellan, who has cleared her for the procedures.    24 HOUR EVENTS:  Underwent angio. Admitted to PACU. No suicidal thoughts.     REVIEW OF SYSTEMS: [ ] Unable to Assess due to neurologic exam   [x] All ROS addressed below are non-contributory, except:  Neuro: [ ] Headache [ ] Back pain [ ] Numbness [ ] Weakness [ ] Ataxia [ ] Dizziness [ ] Aphasia [ ] Dysarthria [ ] Visual disturbance  Resp: [ ] Shortness of breath/dyspnea [ ] Orthopnea [ ] Cough  CV: [ ] Chest pain [ ] Palpitation [ ] Lightheadedness [ ] Syncope  Renal: [ ] Thirst [ ] Edema  GI: [ ] Nausea [ ] Emesis [ ] Abdominal pain [ ] Constipation [ ] Diarrhea  Heme: [ ] Hematemesis [ ] Bright red blood per rectum  ID: [ ] Fever [ ] Chills [ ] Dysuria  ENT: [ ] Rhinorrhea  Psych: [ ] Suicidal ideation  Musk: [x] Knee pain, chronic, unchanged from baseline    VITALS/DATA/ORDERS: [x] Reviewed    EXAMINATION:  General: No acute distress  HEENT: Anicteric sclerae  Cardiac: [id number]  Lungs: Clear  Abdomen: Soft, non-tender, +BS  Extremities: No c/c/e, no groin hematoma, good distal pulses  Skin/Incision Site: Clean, dry and intact  Neurologic: Awake, alert, fully oriented, follows commands, PERRL, EOMI, trace left facial asymmetry, tongue midline, no drift, full strength (right leg not tested proximally given recent groin puncture)  Psych: Flat affect but denies suicidal/homocidal ideation

## 2019-09-23 NOTE — PROGRESS NOTE ADULT - SUBJECTIVE AND OBJECTIVE BOX
s/p angio/embo of large anterior parasagittal meningioma    Vitals/labs/meds/imaging reviewed    Awake, alert, fully oriented, follows commands, PERRL, EOMI, trace left facial asymmetry, tongue midline, no drift, full strength

## 2019-09-23 NOTE — CHART NOTE - NSCHARTNOTEFT_GEN_A_CORE
Interventional Neuro Radiology  Pre-Procedure Note PA-C    This is a 64y ____ hand dominant Female      HPI:      Allergies: aminolevulinic acid topical (Other)  fluoroquinolone antibiotics (Other)  griseofulvin (Other)  penicillin (Hives)  phenothiazines (Other)  sulfonamides (Other)  sulfonylureas (Other)  tetracycline containing compounds (Other)  tetracyclines (Other)  thiazide diuretics (Other)      PAST MEDICAL & SURGICAL HISTORY:  HLD (hyperlipidemia)  Thyroid nodule: 1999  OA (osteoarthritis)  Depression with anxiety  S/P resection of meningioma: 2003  S/P thyroidectomy: 1999 for nodule  S/P lumbar spinal fusion: 2003      Social History: , non-smoker   FAMILY HISTORY:  Current Medications:     Complete Blood Count (09.10.19 @ 14:09)    WBC Count: 4.95 K/uL    Hemoglobin: 13.7 g/dL    Hematocrit: 42.8 %    Platelet Count - Automated: 298 K/uL    Basic Metabolic Panel (09.10.19 @ 11:33)    Sodium, Serum: 140 mmol/L    Potassium, Serum: 3.8 mmol/L    Chloride, Serum: 101 mmol/L    Carbon Dioxide, Serum: 25 mmol/L    Blood Urea Nitrogen, Serum: 16 mg/dL    Creatinine, Serum: 1.11 mg/dL    Glucose, Serum: 100 mg/dL    Blood Bank:       Assessment/Plan:     This is a 64y  year old right  hand dominant Female  presents with   Patient presents to neuro-IR for selective cerebral angiography.   Procedure, goals, risks, benefits and alternatives  were discussed with patient and patient's family.  All questions were answered.  Risks include but are not limited to stroke, vessel injury, hemorrhage, and or right  groin hematoma.  Patient demonstrates understanding  of all risks involved with this procedure and wishes to continue.   Appropriate  content was obtained from patient and consent is in the patient's chart. Interventional Neuro Radiology  Pre-Procedure Note PA-C    This is a 64 year old left hand dominant female          Allergies: aminolevulinic acid topical (Other), fluoroquinolone antibiotics (Other), griseofulvin (Other), penicillin (Hives), phenothiazines (Other), sulfonamides (Other), sulfonylureas (Other), tetracycline containing compounds (Other), thiazide diuretics (Other)  PMHX: hyperlipidemia,, thyroid nodule, osteoarthritis, depression with anxiety  PSHX: resection of meningioma, thyroidectomy, lumbar spinal fusion: 2003  Social History: , non-smoker   FAMILY HISTORY: non-contributory   Current Medications:     Complete Blood Count (09.10.19 @ 14:09)    WBC Count: 4.95 K/uL    Hemoglobin: 13.7 g/dL    Hematocrit: 42.8 %    Platelet Count - Automated: 298 K/uL    Basic Metabolic Panel (09.10.19 @ 11:33)    Sodium, Serum: 140 mmol/L    Potassium, Serum: 3.8 mmol/L    Chloride, Serum: 101 mmol/L    Carbon Dioxide, Serum: 25 mmol/L    Blood Urea Nitrogen, Serum: 16 mg/dL    Creatinine, Serum: 1.11 mg/dL    Glucose, Serum: 100 mg/dL    Blood Bank: O positive available       Assessment/Plan:   This is a 64 year old left hand dominant female with to Neuro-IR for selective cerebral angiography.   Procedure, goals, risks, benefits and alternatives were discussed with patient and patient's family. All questions were answered. Risks include but are not limited to stroke, vessel injury, hemorrhage, and or right groin hematoma. Patient demonstrates understanding of all risks involved with this procedure and wishes to continue. Appropriate content was obtained from patient and consent is in the patient's chart. Interventional Neuro Radiology  Pre-Procedure Note PA-C    This is a 64 year old left hand dominant female with a past medical history significant for hyperlipidemia, thyroid nodule, s/post thyroidectomy, osteoarthritis s/post spinal fusion in 2003 and gel injections to both knees and brain hemangioma status post resection in 2003, with complaints of swelling on the top of her head. MRI of the brain revealed a midline frontal meningioma. Patient presents to Neuro IR for a selective cerebral angiography and embolization, prior to craniotomy for resection of meningioma.     Allergies: aminolevulinic acid topical (Other), fluoroquinolone antibiotics (Other), griseofulvin (Other), penicillin (Hives), phenothiazines (Other), sulfonamides (Other), sulfonylureas (Other), tetracycline containing compounds (Other), thiazide diuretics (Other)  PMHX: hyperlipidemia,, thyroid nodule, osteoarthritis, depression with anxiety  PSHX: resection of meningioma, thyroidectomy, lumbar spinal fusion: 2003  Social History: , non-smoker   FAMILY HISTORY: non-contributory   Current Medications: Klonopin 1mg, Wellbutrin XL 300mg, synthroid 100mcg, Lipitor 40mg, folic acid     Complete Blood Count (09.10.19 @ 14:09)    WBC Count: 4.95 K/uL    Hemoglobin: 13.7 g/dL    Hematocrit: 42.8 %    Platelet Count - Automated: 298 K/uL    Basic Metabolic Panel (09.10.19 @ 11:33)    Sodium, Serum: 140 mmol/L    Potassium, Serum: 3.8 mmol/L    Chloride, Serum: 101 mmol/L    Carbon Dioxide, Serum: 25 mmol/L    Blood Urea Nitrogen, Serum: 16 mg/dL    Creatinine, Serum: 1.11 mg/dL    Glucose, Serum: 100 mg/dL    Blood Bank: O positive available       Assessment/Plan:   This is a 64 year old left hand dominant female with a midline frontal meningioma to Neuro-IR for selective cerebral angiography and embolization of tumor, prior to craniotomy for resection of tumor. Procedure, goals, risks, benefits and alternatives were discussed with patient and patient's family. All questions were answered. Risks include but are not limited to stroke, vessel injury, hemorrhage, and or right groin hematoma. Patient demonstrates understanding of all risks involved with this procedure and wishes to continue. Appropriate content was obtained from patient and consent is in the patient's chart. Interventional Neuro Radiology  Pre-Procedure Note PA-C    This is a 64 year old left hand dominant female with a past medical history significant for hyperlipidemia, thyroid nodule, s/post thyroidectomy, osteoarthritis s/post spinal fusion in 2003 and gel injections to both knees and brain hemangioma status post resection in 2003, with complaints of swelling on the top of her head. MRI of the brain revealed a midline frontal meningioma. Patient presents to Neuro IR for a selective cerebral angiography and embolization, prior to craniotomy for resection of meningioma.     Allergies: aminolevulinic acid topical (Other), fluoroquinolone antibiotics (Other), griseofulvin (Other), penicillin (Hives), phenothiazines (Other), sulfonamides (Other), sulfonylureas (Other), tetracycline containing compounds (Other), thiazide diuretics (Other)  PMHX: hyperlipidemia,, thyroid nodule, osteoarthritis, depression with anxiety  PSHX: resection of meningioma, thyroidectomy, lumbar spinal fusion: 2003  Social History: , non-smoker   FAMILY HISTORY: non-contributory   Current Medications: Klonopin 1mg, Wellbutrin XL 300mg, synthroid 100mcg, Lipitor 40mg, folic acid     Complete Blood Count (09.10.19 @ 14:09)    WBC Count: 4.95 K/uL    Hemoglobin: 13.7 g/dL    Hematocrit: 42.8 %    Platelet Count - Automated: 298 K/uL    Basic Metabolic Panel (09.10.19 @ 11:33)    Sodium, Serum: 140 mmol/L    Potassium, Serum: 3.8 mmol/L    Chloride, Serum: 101 mmol/L    Carbon Dioxide, Serum: 25 mmol/L    Blood Urea Nitrogen, Serum: 16 mg/dL    Creatinine, Serum: 1.11 mg/dL    Glucose, Serum: 100 mg/dL    Blood Bank: O positive available until 10-1-2019      Assessment/Plan:   This is a 64 year old left hand dominant female with a midline frontal meningioma to Neuro-IR for selective cerebral angiography and embolization of tumor, prior to craniotomy for resection of tumor. Procedure, goals, risks, benefits and alternatives were discussed with patient and patient's family. All questions were answered. Risks include but are not limited to stroke, vessel injury, hemorrhage, and or right groin hematoma. Patient demonstrates understanding of all risks involved with this procedure and wishes to continue. Appropriate content was obtained from patient and consent is in the patient's chart.

## 2019-09-24 ENCOUNTER — RESULT REVIEW (OUTPATIENT)
Age: 64
End: 2019-09-24

## 2019-09-24 ENCOUNTER — APPOINTMENT (OUTPATIENT)
Dept: NEUROSURGERY | Facility: HOSPITAL | Age: 64
End: 2019-09-24

## 2019-09-24 LAB
ANION GAP SERPL CALC-SCNC: 11 MMOL/L — SIGNIFICANT CHANGE UP (ref 5–17)
ANION GAP SERPL CALC-SCNC: 15 MMOL/L — SIGNIFICANT CHANGE UP (ref 5–17)
ANION GAP SERPL CALC-SCNC: 15 MMOL/L — SIGNIFICANT CHANGE UP (ref 5–17)
BUN SERPL-MCNC: 13 MG/DL — SIGNIFICANT CHANGE UP (ref 7–23)
BUN SERPL-MCNC: 13 MG/DL — SIGNIFICANT CHANGE UP (ref 7–23)
BUN SERPL-MCNC: 14 MG/DL — SIGNIFICANT CHANGE UP (ref 7–23)
CALCIUM SERPL-MCNC: 7.7 MG/DL — LOW (ref 8.4–10.5)
CALCIUM SERPL-MCNC: 9.4 MG/DL — SIGNIFICANT CHANGE UP (ref 8.4–10.5)
CALCIUM SERPL-MCNC: 9.4 MG/DL — SIGNIFICANT CHANGE UP (ref 8.4–10.5)
CHLORIDE SERPL-SCNC: 107 MMOL/L — SIGNIFICANT CHANGE UP (ref 96–108)
CHLORIDE SERPL-SCNC: 107 MMOL/L — SIGNIFICANT CHANGE UP (ref 96–108)
CHLORIDE SERPL-SCNC: 117 MMOL/L — HIGH (ref 96–108)
CO2 SERPL-SCNC: 18 MMOL/L — LOW (ref 22–31)
CO2 SERPL-SCNC: 21 MMOL/L — LOW (ref 22–31)
CO2 SERPL-SCNC: 21 MMOL/L — LOW (ref 22–31)
CREAT SERPL-MCNC: 0.95 MG/DL — SIGNIFICANT CHANGE UP (ref 0.5–1.3)
CREAT SERPL-MCNC: 0.95 MG/DL — SIGNIFICANT CHANGE UP (ref 0.5–1.3)
CREAT SERPL-MCNC: 1.09 MG/DL — SIGNIFICANT CHANGE UP (ref 0.5–1.3)
GLUCOSE BLDC GLUCOMTR-MCNC: 113 MG/DL — HIGH (ref 70–99)
GLUCOSE SERPL-MCNC: 131 MG/DL — HIGH (ref 70–99)
GLUCOSE SERPL-MCNC: 140 MG/DL — HIGH (ref 70–99)
GLUCOSE SERPL-MCNC: 144 MG/DL — HIGH (ref 70–99)
HCT VFR BLD CALC: 37.1 % — SIGNIFICANT CHANGE UP (ref 34.5–45)
HCT VFR BLD CALC: 42.2 % — SIGNIFICANT CHANGE UP (ref 34.5–45)
HCT VFR BLD CALC: 42.8 % — SIGNIFICANT CHANGE UP (ref 34.5–45)
HGB BLD-MCNC: 11.9 G/DL — SIGNIFICANT CHANGE UP (ref 11.5–15.5)
HGB BLD-MCNC: 13.5 G/DL — SIGNIFICANT CHANGE UP (ref 11.5–15.5)
HGB BLD-MCNC: 13.7 G/DL — SIGNIFICANT CHANGE UP (ref 11.5–15.5)
MAGNESIUM SERPL-MCNC: 1.9 MG/DL — SIGNIFICANT CHANGE UP (ref 1.6–2.6)
MAGNESIUM SERPL-MCNC: 1.9 MG/DL — SIGNIFICANT CHANGE UP (ref 1.6–2.6)
MCHC RBC-ENTMCNC: 28.2 PG — SIGNIFICANT CHANGE UP (ref 27–34)
MCHC RBC-ENTMCNC: 28.2 PG — SIGNIFICANT CHANGE UP (ref 27–34)
MCHC RBC-ENTMCNC: 28.3 PG — SIGNIFICANT CHANGE UP (ref 27–34)
MCHC RBC-ENTMCNC: 32 GM/DL — SIGNIFICANT CHANGE UP (ref 32–36)
MCHC RBC-ENTMCNC: 32.1 GM/DL — SIGNIFICANT CHANGE UP (ref 32–36)
MCHC RBC-ENTMCNC: 32.1 GM/DL — SIGNIFICANT CHANGE UP (ref 32–36)
MCV RBC AUTO: 87.8 FL — SIGNIFICANT CHANGE UP (ref 80–100)
MCV RBC AUTO: 87.9 FL — SIGNIFICANT CHANGE UP (ref 80–100)
MCV RBC AUTO: 88.5 FL — SIGNIFICANT CHANGE UP (ref 80–100)
PHOSPHATE SERPL-MCNC: 1.6 MG/DL — LOW (ref 2.5–4.5)
PHOSPHATE SERPL-MCNC: 2.1 MG/DL — LOW (ref 2.5–4.5)
PLATELET # BLD AUTO: 278 K/UL — SIGNIFICANT CHANGE UP (ref 150–400)
PLATELET # BLD AUTO: 296 K/UL — SIGNIFICANT CHANGE UP (ref 150–400)
PLATELET # BLD AUTO: 327 K/UL — SIGNIFICANT CHANGE UP (ref 150–400)
POTASSIUM SERPL-MCNC: 3.5 MMOL/L — SIGNIFICANT CHANGE UP (ref 3.5–5.3)
POTASSIUM SERPL-MCNC: 3.6 MMOL/L — SIGNIFICANT CHANGE UP (ref 3.5–5.3)
POTASSIUM SERPL-MCNC: 4.1 MMOL/L — SIGNIFICANT CHANGE UP (ref 3.5–5.3)
POTASSIUM SERPL-SCNC: 3.5 MMOL/L — SIGNIFICANT CHANGE UP (ref 3.5–5.3)
POTASSIUM SERPL-SCNC: 3.6 MMOL/L — SIGNIFICANT CHANGE UP (ref 3.5–5.3)
POTASSIUM SERPL-SCNC: 4.1 MMOL/L — SIGNIFICANT CHANGE UP (ref 3.5–5.3)
RBC # BLD: 4.2 M/UL — SIGNIFICANT CHANGE UP (ref 3.8–5.2)
RBC # BLD: 4.8 M/UL — SIGNIFICANT CHANGE UP (ref 3.8–5.2)
RBC # BLD: 4.87 M/UL — SIGNIFICANT CHANGE UP (ref 3.8–5.2)
RBC # FLD: 11.3 % — SIGNIFICANT CHANGE UP (ref 10.3–14.5)
RBC # FLD: 11.4 % — SIGNIFICANT CHANGE UP (ref 10.3–14.5)
RBC # FLD: 11.5 % — SIGNIFICANT CHANGE UP (ref 10.3–14.5)
SODIUM SERPL-SCNC: 143 MMOL/L — SIGNIFICANT CHANGE UP (ref 135–145)
SODIUM SERPL-SCNC: 143 MMOL/L — SIGNIFICANT CHANGE UP (ref 135–145)
SODIUM SERPL-SCNC: 146 MMOL/L — HIGH (ref 135–145)
WBC # BLD: 21.3 K/UL — HIGH (ref 3.8–10.5)
WBC # BLD: 8.2 K/UL — SIGNIFICANT CHANGE UP (ref 3.8–10.5)
WBC # BLD: 8.4 K/UL — SIGNIFICANT CHANGE UP (ref 3.8–10.5)
WBC # FLD AUTO: 21.3 K/UL — HIGH (ref 3.8–10.5)
WBC # FLD AUTO: 8.2 K/UL — SIGNIFICANT CHANGE UP (ref 3.8–10.5)
WBC # FLD AUTO: 8.4 K/UL — SIGNIFICANT CHANGE UP (ref 3.8–10.5)

## 2019-09-24 PROCEDURE — 61781 SCAN PROC CRANIAL INTRA: CPT

## 2019-09-24 PROCEDURE — 61512 CRNEC TREPH EXC MNGIOMA STTL: CPT

## 2019-09-24 PROCEDURE — 62141 CRNOP SKULL DEFECT>5 CM DIAM: CPT | Mod: 59

## 2019-09-24 PROCEDURE — 88360 TUMOR IMMUNOHISTOCHEM/MANUAL: CPT | Mod: 26

## 2019-09-24 PROCEDURE — 99291 CRITICAL CARE FIRST HOUR: CPT

## 2019-09-24 PROCEDURE — 99292 CRITICAL CARE ADDL 30 MIN: CPT

## 2019-09-24 PROCEDURE — 88311 DECALCIFY TISSUE: CPT | Mod: 26

## 2019-09-24 PROCEDURE — 93970 EXTREMITY STUDY: CPT | Mod: 26

## 2019-09-24 PROCEDURE — 88342 IMHCHEM/IMCYTCHM 1ST ANTB: CPT | Mod: 26,59

## 2019-09-24 PROCEDURE — 88307 TISSUE EXAM BY PATHOLOGIST: CPT | Mod: 26

## 2019-09-24 PROCEDURE — 69990 MICROSURGERY ADD-ON: CPT | Mod: 59

## 2019-09-24 RX ORDER — ACETAMINOPHEN 500 MG
1000 TABLET ORAL ONCE
Refills: 0 | Status: COMPLETED | OUTPATIENT
Start: 2019-09-24 | End: 2019-09-24

## 2019-09-24 RX ORDER — LEVETIRACETAM 250 MG/1
500 TABLET, FILM COATED ORAL
Refills: 0 | Status: DISCONTINUED | OUTPATIENT
Start: 2019-09-24 | End: 2019-09-25

## 2019-09-24 RX ORDER — POTASSIUM CHLORIDE 20 MEQ
20 PACKET (EA) ORAL ONCE
Refills: 0 | Status: COMPLETED | OUTPATIENT
Start: 2019-09-24 | End: 2019-09-24

## 2019-09-24 RX ORDER — NAFCILLIN 10 G/100ML
2 INJECTION, POWDER, FOR SOLUTION INTRAVENOUS EVERY 4 HOURS
Refills: 0 | Status: COMPLETED | OUTPATIENT
Start: 2019-09-24 | End: 2019-09-25

## 2019-09-24 RX ORDER — DEXAMETHASONE 0.5 MG/5ML
4 ELIXIR ORAL EVERY 6 HOURS
Refills: 0 | Status: COMPLETED | OUTPATIENT
Start: 2019-09-24 | End: 2019-09-25

## 2019-09-24 RX ORDER — ACETAMINOPHEN 500 MG
650 TABLET ORAL EVERY 6 HOURS
Refills: 0 | Status: DISCONTINUED | OUTPATIENT
Start: 2019-09-24 | End: 2019-10-02

## 2019-09-24 RX ORDER — OXYCODONE HYDROCHLORIDE 5 MG/1
5 TABLET ORAL EVERY 4 HOURS
Refills: 0 | Status: DISCONTINUED | OUTPATIENT
Start: 2019-09-24 | End: 2019-09-26

## 2019-09-24 RX ORDER — PANTOPRAZOLE SODIUM 20 MG/1
40 TABLET, DELAYED RELEASE ORAL
Refills: 0 | Status: DISCONTINUED | OUTPATIENT
Start: 2019-09-24 | End: 2019-10-02

## 2019-09-24 RX ORDER — DEXAMETHASONE 0.5 MG/5ML
3 ELIXIR ORAL EVERY 8 HOURS
Refills: 0 | Status: DISCONTINUED | OUTPATIENT
Start: 2019-09-26 | End: 2019-09-26

## 2019-09-24 RX ORDER — INSULIN LISPRO 100/ML
VIAL (ML) SUBCUTANEOUS
Refills: 0 | Status: DISCONTINUED | OUTPATIENT
Start: 2019-09-24 | End: 2019-09-24

## 2019-09-24 RX ORDER — POTASSIUM PHOSPHATE, MONOBASIC POTASSIUM PHOSPHATE, DIBASIC 236; 224 MG/ML; MG/ML
15 INJECTION, SOLUTION INTRAVENOUS ONCE
Refills: 0 | Status: COMPLETED | OUTPATIENT
Start: 2019-09-24 | End: 2019-09-24

## 2019-09-24 RX ORDER — DEXTROSE MONOHYDRATE, SODIUM CHLORIDE, AND POTASSIUM CHLORIDE 50; .745; 4.5 G/1000ML; G/1000ML; G/1000ML
1000 INJECTION, SOLUTION INTRAVENOUS
Refills: 0 | Status: DISCONTINUED | OUTPATIENT
Start: 2019-09-24 | End: 2019-09-26

## 2019-09-24 RX ORDER — DEXAMETHASONE 0.5 MG/5ML
4 ELIXIR ORAL EVERY 8 HOURS
Refills: 0 | Status: DISCONTINUED | OUTPATIENT
Start: 2019-09-25 | End: 2019-09-26

## 2019-09-24 RX ORDER — DEXAMETHASONE 0.5 MG/5ML
ELIXIR ORAL
Refills: 0 | Status: DISCONTINUED | OUTPATIENT
Start: 2019-09-25 | End: 2019-09-26

## 2019-09-24 RX ADMIN — OXYCODONE HYDROCHLORIDE 5 MILLIGRAM(S): 5 TABLET ORAL at 21:12

## 2019-09-24 RX ADMIN — Medication 20 MILLIEQUIVALENT(S): at 05:02

## 2019-09-24 RX ADMIN — NAFCILLIN 200 GRAM(S): 10 INJECTION, POWDER, FOR SOLUTION INTRAVENOUS at 21:30

## 2019-09-24 RX ADMIN — Medication 4 MILLIGRAM(S): at 23:25

## 2019-09-24 RX ADMIN — Medication 400 MILLIGRAM(S): at 16:53

## 2019-09-24 RX ADMIN — BUPROPION HYDROCHLORIDE 300 MILLIGRAM(S): 150 TABLET, EXTENDED RELEASE ORAL at 05:02

## 2019-09-24 RX ADMIN — CHLORHEXIDINE GLUCONATE 1 APPLICATION(S): 213 SOLUTION TOPICAL at 21:31

## 2019-09-24 RX ADMIN — DEXTROSE MONOHYDRATE, SODIUM CHLORIDE, AND POTASSIUM CHLORIDE 125 MILLILITER(S): 50; .745; 4.5 INJECTION, SOLUTION INTRAVENOUS at 16:46

## 2019-09-24 RX ADMIN — Medication 1 MILLIGRAM(S): at 05:02

## 2019-09-24 RX ADMIN — OXYCODONE HYDROCHLORIDE 5 MILLIGRAM(S): 5 TABLET ORAL at 20:42

## 2019-09-24 RX ADMIN — LEVETIRACETAM 500 MILLIGRAM(S): 250 TABLET, FILM COATED ORAL at 17:11

## 2019-09-24 RX ADMIN — Medication 1000 MILLIGRAM(S): at 17:25

## 2019-09-24 RX ADMIN — POTASSIUM PHOSPHATE, MONOBASIC POTASSIUM PHOSPHATE, DIBASIC 62.5 MILLIMOLE(S): 236; 224 INJECTION, SOLUTION INTRAVENOUS at 03:49

## 2019-09-24 RX ADMIN — Medication 100 MICROGRAM(S): at 05:02

## 2019-09-24 RX ADMIN — NAFCILLIN 200 GRAM(S): 10 INJECTION, POWDER, FOR SOLUTION INTRAVENOUS at 17:11

## 2019-09-24 RX ADMIN — ARIPIPRAZOLE 2 MILLIGRAM(S): 15 TABLET ORAL at 05:02

## 2019-09-24 RX ADMIN — SODIUM CHLORIDE 70 MILLILITER(S): 9 INJECTION INTRAMUSCULAR; INTRAVENOUS; SUBCUTANEOUS at 07:00

## 2019-09-24 RX ADMIN — Medication 1 MILLIGRAM(S): at 21:30

## 2019-09-24 RX ADMIN — Medication 4 MILLIGRAM(S): at 05:02

## 2019-09-24 RX ADMIN — ATORVASTATIN CALCIUM 40 MILLIGRAM(S): 80 TABLET, FILM COATED ORAL at 21:30

## 2019-09-24 RX ADMIN — SODIUM CHLORIDE 70 MILLILITER(S): 9 INJECTION INTRAMUSCULAR; INTRAVENOUS; SUBCUTANEOUS at 08:19

## 2019-09-24 RX ADMIN — DEXTROSE MONOHYDRATE, SODIUM CHLORIDE, AND POTASSIUM CHLORIDE 125 MILLILITER(S): 50; .745; 4.5 INJECTION, SOLUTION INTRAVENOUS at 23:26

## 2019-09-24 RX ADMIN — Medication 4 MILLIGRAM(S): at 16:44

## 2019-09-24 NOTE — PROGRESS NOTE ADULT - ASSESSMENT
64 F s/p resection of midline frontal meningioma    Given patient improvement in postoperative exam, will get CT head in am.  No need for postoperative MRI.  Decadron 4Q6 / ISS / protonix as per routine.  keppra 500mg BID.   PT/OT/PMR.  Sagittal sinus was ligated, keep fluids @ 125 for 48 hours.   Incision check Q4 hours.   Q1 neurochecks.

## 2019-09-24 NOTE — PROGRESS NOTE ADULT - SUBJECTIVE AND OBJECTIVE BOX
Had lengthy conversation with patient regarding today's surgery. Details of the procedures, results of yesterday's embolization/angiography were explained. 5-ALA use and it's implications, possible risks and benefits were explained. At the end of our conversation the patient had no further questions.

## 2019-09-24 NOTE — PROGRESS NOTE ADULT - ASSESSMENT
ASSESSMENT/PLAN: Meningioma, post-operative day 0 from near complete embolization    NEURO:  Plan for OR today   Pain control  Activity: [] mobilize as tolerated [x] Bedrest x 4 hours given groin puncture; remove SafeGuard at 3PM [] PT [] OT [] PMNR    PULM:  Incentive spirometry    CV:  SBP goal 100-140mmHg  HLD: statin    RENAL:  Fluids: IVF given recent contrast    GI:  Diet: Diet as tolerated today, but NPO p MN for OR tomorrow  Bowel regimen     ENDO:   Goal euglycemia (-180)  Hypothyroidism: continue levothyroxine    HEME/ONC:  VTE prophylaxis: [x] SCDs [] chemoprophylaxis - per NeuroIR [] hold chemoprophylaxis due to: [x] high risk of DVT/PE on admission due to: brain tumor    ID:  Monitor for fever    MISC:  Psych: depression - continue Wellbutrin    SOCIAL/FAMILY:  [x] awaiting [] updated at bedside [] family meeting    CODE STATUS:  [x] Full Code [] DNR [] DNI [] Palliative/Comfort Care    DISPOSITION:  [x] ICU/PACU [] Stroke Unit [] Floor [] EMU [] RCU [] PCU    [x] Patient is at high risk of neurologic deterioration/death due to: brain compression, hemorrhage, ischemic stroke    Time spent: 45 [x] critical care minutes    Contact: [phone] ASSESSMENT/PLAN: Meningioma, s/p embolization from near complete embolization    NEURO:  Plan for OR today   Pain control  Activity: [] mobilize as tolerated [x] Bedrest  [] PT [] OT [] PMNR    PULM:  Incentive spirometry    CV:  SBP goal 100-140mmHg  HLD: statin    RENAL:  Fluids: IVF given recent contrast    GI:  Diet: NPO OR   Bowel regimen     ENDO:   Goal euglycemia (-180)  Hypothyroidism: continue levothyroxine    HEME/ONC:  VTE prophylaxis: [x] SCDs [] chemoprophylaxis - per NeuroIR [] hold chemoprophylaxis due to: [x] high risk of DVT/PE on admission due to: brain tumor    ID:  Monitor for fever    MISC:  Psych: depression - continue Wellbutrin    SOCIAL/FAMILY:  [x] awaiting [] updated at bedside [] family meeting    CODE STATUS:  [x] Full Code [] DNR [] DNI [] Palliative/Comfort Care    DISPOSITION:  [x] ICU/PACU [] Stroke Unit [] Floor [] EMU [] RCU [] PCU    [x] Patient is at high risk of neurologic deterioration/death due to: brain compression, hemorrhage, ischemic stroke    Time spent: 45 [x] critical care minutes    Contact: [phone]

## 2019-09-24 NOTE — PROGRESS NOTE ADULT - ASSESSMENT
PAD#1 angio/embo of meningioma  POD#0 crani tumor resection w/ 5-ALA    CTH in am  dark room x48hrs  OOB as tolerated in the room  suicidal ideation in the past, followed by outpt psych Dr. Mcclellan--continue recommended meds  decadron for cerebral edema  -140  encourage incentive spirometry as able  maintain euglycemia  cont synthroid  gorge op abxs   ble dopplers negative PAD#1 angio/embo of meningioma  POD#0 crani tumor resection w/ 5-ALA    CTH in am  dark room x48hrs  OOB as tolerated in the room  suicidal ideation in the past, followed by outpt psych Dr. Mcclellan--continue recommended meds  decadron for cerebral edema  -140  encourage incentive spirometry as able  IVF  maintain euglycemia  cont synthroid  gorge op abxs   ble dopplers negative

## 2019-09-24 NOTE — PROGRESS NOTE ADULT - SUBJECTIVE AND OBJECTIVE BOX
Mrs. Macey Fajardo is a 64 F left handed who is now s/p resection of a midline frontal recurrent meningioma. Her first resection was by Dr. Davis as per  ~16 years ago. She has never had postoperative radiation. Her meningioma was found to be enlarging in size, she received a pre-operative angiogram with embolization via selective distal MMA branch.     Postoperatively, she is awake, following commands, and oriented x 3. Her family is at bedside. She is able to note her family members name, date of birth, and relationship, She has intermittent word finding difficulty. She moves all extremities with good strength.     MEDICATIONS  (STANDING):  ARIPiprazole 2 milliGRAM(s) Oral every 24 hours  atorvastatin 40 milliGRAM(s) Oral at bedtime  buPROPion XL . 300 milliGRAM(s) Oral every 24 hours  chlorhexidine 4% Liquid 1 Application(s) Topical <User Schedule>  clonazePAM  Tablet 1 milliGRAM(s) Oral three times a day  dexamethasone  Injectable 4 milliGRAM(s) IV Push every 6 hours  folic acid 1 milliGRAM(s) Oral daily  influenza   Vaccine 0.5 milliLiter(s) IntraMuscular once  levETIRAcetam 500 milliGRAM(s) Oral two times a day  levothyroxine 100 MICROGram(s) Oral daily  nafcillin  IVPB 2 Gram(s) IV Intermittent every 4 hours  sodium chloride 0.9% with potassium chloride 20 mEq/L 1000 milliLiter(s) (125 mL/Hr) IV Continuous <Continuous>    MEDICATIONS  (PRN):  acetaminophen   Tablet .. 650 milliGRAM(s) Oral every 6 hours PRN Mild Pain (1 - 3)  acetaminophen  IVPB .. 1000 milliGRAM(s) IV Intermittent once PRN Severe Pain (7 - 10)    ICU Vital Signs Last 24 Hrs  T(C): 36.9 (24 Sep 2019 15:00), Max: 36.9 (24 Sep 2019 15:00)  T(F): 98.4 (24 Sep 2019 15:00), Max: 98.4 (24 Sep 2019 15:00)  HR: 81 (24 Sep 2019 15:45) (70 - 103)  BP: 126/76 (23 Sep 2019 21:00) (126/76 - 160/95)  BP(mean): 95 (23 Sep 2019 21:00) (95 - 123)  ABP: 127/62 (24 Sep 2019 15:45) (122/64 - 151/80)  ABP(mean): 89 (24 Sep 2019 15:45) (88 - 111)  RR: 20 (24 Sep 2019 15:45) (13 - 24)  SpO2: 98% (24 Sep 2019 15:45) (92% - 100%)

## 2019-09-24 NOTE — PROGRESS NOTE ADULT - SUBJECTIVE AND OBJECTIVE BOX
5-ALA given, s/p crani for tumor resection    Vitals/labs/meds/imaging reviewed    Awake, alert, fully oriented, follows commands, PERRL, EOMI, trace left facial asymmetry, tongue midline, no drift, full strength 5-ALA given, s/p crani for tumor resection    c/o blurry vision, no double vision, can finger count  Vitals/labs/meds/imaging reviewed    Awake, alert, fully oriented, follows commands, PERRL, EOMI, trace left facial asymmetry, tongue midline, no drift, full strength

## 2019-09-24 NOTE — PROGRESS NOTE ADULT - SUBJECTIVE AND OBJECTIVE BOX
Patient seen and examined at bedside.    --Anticoagulation--    T(C): 36.9 (09-24-19 @ 15:00), Max: 36.9 (09-24-19 @ 15:00)  HR: 86 (09-24-19 @ 15:15) (70 - 103)  BP: 126/76 (09-23-19 @ 21:00) (126/76 - 160/95)  RR: 20 (09-24-19 @ 15:15) (14 - 24)  SpO2: 99% (09-24-19 @ 15:15) (92% - 100%)  Wt(kg): --    Exam:  AOx3, FC, PERRL, EOMI, mild left facial   5/5 throughout, no drift  SILT  no clonus

## 2019-09-24 NOTE — PROGRESS NOTE ADULT - SUBJECTIVE AND OBJECTIVE BOX
SUMMARY:  Mrs. Fajardo is a 64 year-old woman with history of hyperlipidemia, thyroid nodule status post thyroidectomy (1999), osteoarthritis status post spinal fusion (2003) and gel injections to both knees and meningioma status post resection (2003, at [facility] with Dr. Nikita Davis) who noted swelling on top of her head  and was noted to have recurrent meningioma for which she underwent near complete vascular embolization on 9/23/19 and is planned for resection on 9/24/19. She reportedly has been very anxious about these procedures, even endorsing suicidal feelings, for which she has been regularly seeing her psychiatrist, Dr. Mcclellan, who has cleared her for the procedures.    24 HOUR EVENTS:  Underwent angio. Admitted to NSICU. No suicidal thoughts.     REVIEW OF SYSTEMS: [ ] Unable to Assess due to neurologic exam   [x] All ROS addressed below are non-contributory, except:  Neuro: [ ] Headache [ ] Back pain [ ] Numbness [ ] Weakness [ ] Ataxia [ ] Dizziness [ ] Aphasia [ ] Dysarthria [ ] Visual disturbance  Resp: [ ] Shortness of breath/dyspnea [ ] Orthopnea [ ] Cough  CV: [ ] Chest pain [ ] Palpitation [ ] Lightheadedness [ ] Syncope  Renal: [ ] Thirst [ ] Edema  GI: [ ] Nausea [ ] Emesis [ ] Abdominal pain [ ] Constipation [ ] Diarrhea  Heme: [ ] Hematemesis [ ] Bright red blood per rectum  ID: [ ] Fever [ ] Chills [ ] Dysuria  ENT: [ ] Rhinorrhea  Psych: [ ] Suicidal ideation  Musk: [x] Knee pain, chronic, unchanged from baseline            ICU Vital Signs Last 24 Hrs  T(C): 36.8 (24 Sep 2019 05:00), Max: 36.8 (24 Sep 2019 03:00)  T(F): 98.2 (24 Sep 2019 05:00), Max: 98.2 (24 Sep 2019 03:00)  HR: 78 (24 Sep 2019 06:00) (64 - 103)  BP: 126/76 (23 Sep 2019 21:00) (115/65 - 160/95)  BP(mean): 95 (23 Sep 2019 21:00) (85 - 123)  ABP: 146/77 (24 Sep 2019 06:00) (119/62 - 146/77)  ABP(mean): 105 (24 Sep 2019 06:00) (85 - 110)  RR: 14 (24 Sep 2019 06:00) (14 - 20)  SpO2: 100% (24 Sep 2019 06:00) (92% - 100%)      09-23-19 @ 07:01  -  09-24-19 @ 06:42  --------------------------------------------------------  IN: 1377.5 mL / OUT: 2040 mL / NET: -662.5 mL        ARIPiprazole 2 milliGRAM(s) Oral every 24 hours  atorvastatin 40 milliGRAM(s) Oral at bedtime  buPROPion XL . 300 milliGRAM(s) Oral every 24 hours  chlorhexidine 4% Liquid 1 Application(s) Topical <User Schedule>  clonazePAM  Tablet 1 milliGRAM(s) Oral three times a day  dexamethasone  Injectable 4 milliGRAM(s) IV Push every 6 hours  folic acid 1 milliGRAM(s) Oral daily  influenza   Vaccine 0.5 milliLiter(s) IntraMuscular once  levothyroxine 100 MICROGram(s) Oral daily  sodium chloride 0.9%. 1000 milliLiter(s) (70 mL/Hr) IV Continuous <Continuous>                          13.5   8.2   )-----------( 296      ( 24 Sep 2019 01:13 )             42.2     09-24    143  |  107  |  13  ----------------------------<  140<H>  3.6   |  21<L>  |  0.95    Ca    9.4      24 Sep 2019 01:13  Phos  2.1     09-24  Mg     1.9     09-24    TPro  6.4  /  Alb  4.1  /  TBili  0.2  /  DBili  <0.1  /  AST  16  /  ALT  18  /  AlkPhos  73  09-23    LIVER FUNCTIONS - ( 23 Sep 2019 12:42 )  Alb: 4.1 g/dL / Pro: 6.4 g/dL / ALK PHOS: 73 U/L / ALT: 18 U/L / AST: 16 U/L / GGT: x             EXAMINATION:  General: No acute distress  HEENT: Anicteric sclerae  Cardiac: [id number]  Lungs: Clear  Abdomen: Soft, non-tender, +BS  Extremities: No c/c/e, no groin hematoma, good distal pulses  Skin/Incision Site: Clean, dry and intact  Neurologic: Awake, alert, fully oriented, follows commands, PERRL, EOMI, trace left facial asymmetry, tongue midline, no drift, full strength (right leg not tested proximally given recent groin puncture)  Psych: Flat affect but denies suicidal/homocidal ideation SUMMARY:  Mrs. Fajardo is a 64 year-old woman with history of hyperlipidemia, thyroid nodule status post thyroidectomy (1999), osteoarthritis status post spinal fusion (2003) and gel injections to both knees and meningioma status post resection (2003, at [place] with Dr. Nikita Davis) who noted swelling on top of her head  and was noted to have recurrent meningioma for which she underwent near complete vascular embolization on 9/23/19 and is planned for resection on 9/24/19. She reportedly has been very anxious about these procedures, even endorsing suicidal feelings, for which she has been regularly seeing her psychiatrist, Dr. Mcclellan, who has cleared her for the procedures.    24 HOUR EVENTS:  Underwent angio. Admitted to NSICU. No suicidal thoughts.     REVIEW OF SYSTEMS: [ ] Unable to Assess due to neurologic exam   [x] All ROS addressed below are non-contributory, except:  Neuro: [ ] Headache [ ] Back pain [ ] Numbness [ ] Weakness [ ] Ataxia [ ] Dizziness [ ] Aphasia [ ] Dysarthria [ ] Visual disturbance  Resp: [ ] Shortness of breath/dyspnea [ ] Orthopnea [ ] Cough  CV: [ ] Chest pain [ ] Palpitation [ ] Lightheadedness [ ] Syncope  Renal: [ ] Thirst [ ] Edema  GI: [ ] Nausea [ ] Emesis [ ] Abdominal pain [ ] Constipation [ ] Diarrhea  Heme: [ ] Hematemesis [ ] Bright red blood per rectum  ID: [ ] Fever [ ] Chills [ ] Dysuria  ENT: [ ] Rhinorrhea  Psych: [ ] Suicidal ideation  Musk: [x] Knee pain, chronic, unchanged from baseline            ICU Vital Signs Last 24 Hrs  T(C): 36.8 (24 Sep 2019 05:00), Max: 36.8 (24 Sep 2019 03:00)  T(F): 98.2 (24 Sep 2019 05:00), Max: 98.2 (24 Sep 2019 03:00)  HR: 78 (24 Sep 2019 06:00) (64 - 103)  BP: 126/76 (23 Sep 2019 21:00) (115/65 - 160/95)  BP(mean): 95 (23 Sep 2019 21:00) (85 - 123)  ABP: 146/77 (24 Sep 2019 06:00) (119/62 - 146/77)  ABP(mean): 105 (24 Sep 2019 06:00) (85 - 110)  RR: 14 (24 Sep 2019 06:00) (14 - 20)  SpO2: 100% (24 Sep 2019 06:00) (92% - 100%)      09-23-19 @ 07:01  -  09-24-19 @ 06:42  --------------------------------------------------------  IN: 1377.5 mL / OUT: 2040 mL / NET: -662.5 mL        ARIPiprazole 2 milliGRAM(s) Oral every 24 hours  atorvastatin 40 milliGRAM(s) Oral at bedtime  buPROPion XL . 300 milliGRAM(s) Oral every 24 hours  chlorhexidine 4% Liquid 1 Application(s) Topical <User Schedule>  clonazePAM  Tablet 1 milliGRAM(s) Oral three times a day  dexamethasone  Injectable 4 milliGRAM(s) IV Push every 6 hours  folic acid 1 milliGRAM(s) Oral daily  influenza   Vaccine 0.5 milliLiter(s) IntraMuscular once  levothyroxine 100 MICROGram(s) Oral daily  sodium chloride 0.9%. 1000 milliLiter(s) (70 mL/Hr) IV Continuous <Continuous>                          13.5   8.2   )-----------( 296      ( 24 Sep 2019 01:13 )             42.2     09-24    143  |  107  |  13  ----------------------------<  140<H>  3.6   |  21<L>  |  0.95    Ca    9.4      24 Sep 2019 01:13  Phos  2.1     09-24  Mg     1.9     09-24    TPro  6.4  /  Alb  4.1  /  TBili  0.2  /  DBili  <0.1  /  AST  16  /  ALT  18  /  AlkPhos  73  09-23    LIVER FUNCTIONS - ( 23 Sep 2019 12:42 )  Alb: 4.1 g/dL / Pro: 6.4 g/dL / ALK PHOS: 73 U/L / ALT: 18 U/L / AST: 16 U/L / GGT: x             EXAMINATION:  General: No acute distress  HEENT: Anicteric sclerae  Cardiac: [id number]  Lungs: Clear  Abdomen: Soft, non-tender, +BS  Extremities: No c/c/e, no groin hematoma, good distal pulses  Skin/Incision Site: Clean, dry and intact  Neurologic: Awake, alert, fully oriented, follows commands, PERRL, EOMI, trace left facial asymmetry, tongue midline, no drift, full strength   Psych: Flat affect but denies suicidal/homocidal ideation

## 2019-09-25 LAB
ANION GAP SERPL CALC-SCNC: 10 MMOL/L — SIGNIFICANT CHANGE UP (ref 5–17)
ANION GAP SERPL CALC-SCNC: 12 MMOL/L — SIGNIFICANT CHANGE UP (ref 5–17)
BUN SERPL-MCNC: 10 MG/DL — SIGNIFICANT CHANGE UP (ref 7–23)
BUN SERPL-MCNC: 14 MG/DL — SIGNIFICANT CHANGE UP (ref 7–23)
CALCIUM SERPL-MCNC: 7.4 MG/DL — LOW (ref 8.4–10.5)
CALCIUM SERPL-MCNC: 8 MG/DL — LOW (ref 8.4–10.5)
CHLORIDE SERPL-SCNC: 113 MMOL/L — HIGH (ref 96–108)
CHLORIDE SERPL-SCNC: 114 MMOL/L — HIGH (ref 96–108)
CO2 SERPL-SCNC: 20 MMOL/L — LOW (ref 22–31)
CO2 SERPL-SCNC: 21 MMOL/L — LOW (ref 22–31)
CREAT SERPL-MCNC: 0.87 MG/DL — SIGNIFICANT CHANGE UP (ref 0.5–1.3)
CREAT SERPL-MCNC: 0.98 MG/DL — SIGNIFICANT CHANGE UP (ref 0.5–1.3)
GLUCOSE SERPL-MCNC: 124 MG/DL — HIGH (ref 70–99)
GLUCOSE SERPL-MCNC: 149 MG/DL — HIGH (ref 70–99)
HCT VFR BLD CALC: 32.9 % — LOW (ref 34.5–45)
HGB BLD-MCNC: 10.7 G/DL — LOW (ref 11.5–15.5)
MAGNESIUM SERPL-MCNC: 1.8 MG/DL — SIGNIFICANT CHANGE UP (ref 1.6–2.6)
MAGNESIUM SERPL-MCNC: 2.1 MG/DL — SIGNIFICANT CHANGE UP (ref 1.6–2.6)
MCHC RBC-ENTMCNC: 29.5 PG — SIGNIFICANT CHANGE UP (ref 27–34)
MCHC RBC-ENTMCNC: 32.7 GM/DL — SIGNIFICANT CHANGE UP (ref 32–36)
MCV RBC AUTO: 90 FL — SIGNIFICANT CHANGE UP (ref 80–100)
PHOSPHATE SERPL-MCNC: 1.8 MG/DL — LOW (ref 2.5–4.5)
PHOSPHATE SERPL-MCNC: 3.1 MG/DL — SIGNIFICANT CHANGE UP (ref 2.5–4.5)
PLATELET # BLD AUTO: 206 K/UL — SIGNIFICANT CHANGE UP (ref 150–400)
POTASSIUM SERPL-MCNC: 3.9 MMOL/L — SIGNIFICANT CHANGE UP (ref 3.5–5.3)
POTASSIUM SERPL-MCNC: 4.1 MMOL/L — SIGNIFICANT CHANGE UP (ref 3.5–5.3)
POTASSIUM SERPL-SCNC: 3.9 MMOL/L — SIGNIFICANT CHANGE UP (ref 3.5–5.3)
POTASSIUM SERPL-SCNC: 4.1 MMOL/L — SIGNIFICANT CHANGE UP (ref 3.5–5.3)
RBC # BLD: 3.65 M/UL — LOW (ref 3.8–5.2)
RBC # FLD: 11.9 % — SIGNIFICANT CHANGE UP (ref 10.3–14.5)
SODIUM SERPL-SCNC: 145 MMOL/L — SIGNIFICANT CHANGE UP (ref 135–145)
SODIUM SERPL-SCNC: 145 MMOL/L — SIGNIFICANT CHANGE UP (ref 135–145)
WBC # BLD: 13.7 K/UL — HIGH (ref 3.8–10.5)
WBC # FLD AUTO: 13.7 K/UL — HIGH (ref 3.8–10.5)

## 2019-09-25 PROCEDURE — 70450 CT HEAD/BRAIN W/O DYE: CPT | Mod: 26

## 2019-09-25 PROCEDURE — 99291 CRITICAL CARE FIRST HOUR: CPT

## 2019-09-25 RX ORDER — DIVALPROEX SODIUM 500 MG/1
500 TABLET, DELAYED RELEASE ORAL EVERY 8 HOURS
Refills: 0 | Status: DISCONTINUED | OUTPATIENT
Start: 2019-09-25 | End: 2019-09-27

## 2019-09-25 RX ORDER — DOXAZOSIN MESYLATE 4 MG
1 TABLET ORAL AT BEDTIME
Refills: 0 | Status: DISCONTINUED | OUTPATIENT
Start: 2019-09-25 | End: 2019-10-01

## 2019-09-25 RX ORDER — ONDANSETRON 8 MG/1
4 TABLET, FILM COATED ORAL ONCE
Refills: 0 | Status: COMPLETED | OUTPATIENT
Start: 2019-09-25 | End: 2019-09-25

## 2019-09-25 RX ORDER — MAGNESIUM SULFATE 500 MG/ML
2 VIAL (ML) INJECTION ONCE
Refills: 0 | Status: COMPLETED | OUTPATIENT
Start: 2019-09-25 | End: 2019-09-25

## 2019-09-25 RX ORDER — CALCIUM GLUCONATE 100 MG/ML
2 VIAL (ML) INTRAVENOUS ONCE
Refills: 0 | Status: COMPLETED | OUTPATIENT
Start: 2019-09-25 | End: 2019-09-25

## 2019-09-25 RX ORDER — ENOXAPARIN SODIUM 100 MG/ML
40 INJECTION SUBCUTANEOUS
Refills: 0 | Status: DISCONTINUED | OUTPATIENT
Start: 2019-09-25 | End: 2019-10-02

## 2019-09-25 RX ADMIN — LEVETIRACETAM 500 MILLIGRAM(S): 250 TABLET, FILM COATED ORAL at 05:03

## 2019-09-25 RX ADMIN — NAFCILLIN 200 GRAM(S): 10 INJECTION, POWDER, FOR SOLUTION INTRAVENOUS at 01:17

## 2019-09-25 RX ADMIN — OXYCODONE HYDROCHLORIDE 5 MILLIGRAM(S): 5 TABLET ORAL at 17:30

## 2019-09-25 RX ADMIN — BUPROPION HYDROCHLORIDE 300 MILLIGRAM(S): 150 TABLET, EXTENDED RELEASE ORAL at 05:03

## 2019-09-25 RX ADMIN — PANTOPRAZOLE SODIUM 40 MILLIGRAM(S): 20 TABLET, DELAYED RELEASE ORAL at 12:08

## 2019-09-25 RX ADMIN — OXYCODONE HYDROCHLORIDE 5 MILLIGRAM(S): 5 TABLET ORAL at 17:00

## 2019-09-25 RX ADMIN — Medication 4 MILLIGRAM(S): at 05:03

## 2019-09-25 RX ADMIN — OXYCODONE HYDROCHLORIDE 5 MILLIGRAM(S): 5 TABLET ORAL at 12:34

## 2019-09-25 RX ADMIN — OXYCODONE HYDROCHLORIDE 5 MILLIGRAM(S): 5 TABLET ORAL at 13:10

## 2019-09-25 RX ADMIN — Medication 50 GRAM(S): at 02:02

## 2019-09-25 RX ADMIN — Medication 4 MILLIGRAM(S): at 12:08

## 2019-09-25 RX ADMIN — Medication 200 GRAM(S): at 23:41

## 2019-09-25 RX ADMIN — OXYCODONE HYDROCHLORIDE 5 MILLIGRAM(S): 5 TABLET ORAL at 23:23

## 2019-09-25 RX ADMIN — CHLORHEXIDINE GLUCONATE 1 APPLICATION(S): 213 SOLUTION TOPICAL at 21:20

## 2019-09-25 RX ADMIN — OXYCODONE HYDROCHLORIDE 5 MILLIGRAM(S): 5 TABLET ORAL at 06:03

## 2019-09-25 RX ADMIN — Medication 1 MILLIGRAM(S): at 12:08

## 2019-09-25 RX ADMIN — Medication 200 GRAM(S): at 03:49

## 2019-09-25 RX ADMIN — OXYCODONE HYDROCHLORIDE 5 MILLIGRAM(S): 5 TABLET ORAL at 06:33

## 2019-09-25 RX ADMIN — ARIPIPRAZOLE 2 MILLIGRAM(S): 15 TABLET ORAL at 05:03

## 2019-09-25 RX ADMIN — Medication 100 MICROGRAM(S): at 05:03

## 2019-09-25 RX ADMIN — OXYCODONE HYDROCHLORIDE 5 MILLIGRAM(S): 5 TABLET ORAL at 22:53

## 2019-09-25 RX ADMIN — DIVALPROEX SODIUM 500 MILLIGRAM(S): 500 TABLET, DELAYED RELEASE ORAL at 21:20

## 2019-09-25 RX ADMIN — Medication 1 MILLIGRAM(S): at 05:03

## 2019-09-25 RX ADMIN — Medication 1 MILLIGRAM(S): at 21:20

## 2019-09-25 RX ADMIN — Medication 1 MILLIGRAM(S): at 22:15

## 2019-09-25 RX ADMIN — DEXTROSE MONOHYDRATE, SODIUM CHLORIDE, AND POTASSIUM CHLORIDE 125 MILLILITER(S): 50; .745; 4.5 INJECTION, SOLUTION INTRAVENOUS at 12:07

## 2019-09-25 RX ADMIN — DIVALPROEX SODIUM 500 MILLIGRAM(S): 500 TABLET, DELAYED RELEASE ORAL at 13:17

## 2019-09-25 RX ADMIN — ONDANSETRON 4 MILLIGRAM(S): 8 TABLET, FILM COATED ORAL at 02:30

## 2019-09-25 RX ADMIN — Medication 4 MILLIGRAM(S): at 21:20

## 2019-09-25 RX ADMIN — ATORVASTATIN CALCIUM 40 MILLIGRAM(S): 80 TABLET, FILM COATED ORAL at 21:20

## 2019-09-25 RX ADMIN — Medication 85 MILLIMOLE(S): at 23:47

## 2019-09-25 RX ADMIN — Medication 1 MILLIGRAM(S): at 13:17

## 2019-09-25 RX ADMIN — ENOXAPARIN SODIUM 40 MILLIGRAM(S): 100 INJECTION SUBCUTANEOUS at 17:01

## 2019-09-25 NOTE — PROGRESS NOTE ADULT - ASSESSMENT
ASSESSMENT/PLAN: Meningioma, s/p embolization from near complete embolization, s/p craniotomy and meningioma resection 9/24    NEURO:  Meningioma s/p resection  Q1 neuro checks  remains in dark room for 48 hours as Gleolan was used  CTH in am   KEppra for seizure PPX   Pain control  c/w home psych medications   Activity: [x] mobilize as tolerated [] Bedrest  [x] PT [x] OT [] PMNR    PULM:  Incentive spirometry    CV:  SBP goal 100-140mmHg  HLD: statin    RENAL:  Cr nl  Fluids:  NS at 125 ml/hr     GI:  Diet: regular diet   Bowel regimen     ENDO:   Goal euglycemia (-180)  Hypothyroidism: continue levothyroxine    HEME/ONC:  VTE prophylaxis: [x] SCDs [] chemoprophylaxis - per NeuroIR [] hold chemoprophylaxis due to: [x] high risk of DVT/PE on admission due to: brain tumor    ID:  Monitor for fever    MISC:  Psych: depression - continue Wellbutrin    SOCIAL/FAMILY:  [x] awaiting [] updated at bedside [] family meeting    CODE STATUS:  [x] Full Code [] DNR [] DNI [] Palliative/Comfort Care    DISPOSITION:  [x] ICU/PACU [] Stroke Unit [] Floor [] EMU [] RCU [] PCU    [x] Patient is at high risk of neurologic deterioration/death due to: brain compression, hemorrhage, ischemic stroke    Time spent: 45 [x] critical care minutes    Contact: [phone] ASSESSMENT/PLAN: Meningioma, s/p embolization from near complete embolization, s/p craniotomy and meningioma resection 9/24    NEURO:  Meningioma s/p resection  Q1 neuro checks  remains in dark room for 48 hours as Gleolan was used  CTH in am post-op changes   KEppra for seizure PPX, d/c keppra, If NS wants seizure prophylaxis, would use valproic acid instead, given visual hallucinations and decadron   Pain control  consider discontinuing decadron   c/w home psych medications   Activity: [x] mobilize as tolerated [] Bedrest  [x] PT [x] OT [] PMNR    PULM:  Incentive spirometry    CV:  SBP goal 100-140mmHg  HLD: statin    RENAL:  Cr nl  Fluids:  NS at 125 ml/hr , will IVL     GI:  Diet: regular diet   Bowel regimen     ENDO:   Goal euglycemia (-180)  Hypothyroidism: continue levothyroxine    HEME/ONC:  VTE prophylaxis: [x] SCDs [] chemoprophylaxis - per NeuroIR [] hold chemoprophylaxis due to: [x] high risk of DVT/PE on admission due to: brain tumor    ID:  Monitor for fever    MISC:  Psych: depression - continue Wellbutrin    SOCIAL/FAMILY:  [x] awaiting [] updated at bedside [] family meeting    CODE STATUS:  [x] Full Code [] DNR [] DNI [] Palliative/Comfort Care    DISPOSITION:  [x] ICU/PACU [] Stroke Unit [] Floor [] EMU [] RCU [] PCU    [x] Patient is at high risk of neurologic deterioration/death due to: brain compression, hemorrhage, ischemic stroke    Time spent: 35 [x] critical care minutes    Contact: [phone] ASSESSMENT/PLAN: Meningioma, s/p embolization from near complete embolization, s/p craniotomy and meningioma resection 9/24    NEURO:  Meningioma s/p resection  Q1 neuro checks  remains in dark room for 48 hours as Gleolan was used  CTH in am post-op changes   KEppra for seizure PPX, d/c keppra, If NS wants seizure prophylaxis, would use valproic acid instead, given visual hallucinations and decadron   Pain control  vasogenic edema on decadron   consider discontinuing or tapering  decadron   c/w home psych medications   Activity: [x] mobilize as tolerated [] Bedrest  [x] PT [x] OT [] PMNR    PULM:  Incentive spirometry    CV:  SBP goal 100-140mmHg  HLD: statin    RENAL:  Cr nl  Fluids:  NS at 125 ml/hr , will IVL     GI:  Diet: regular diet   Bowel regimen     ENDO:   Goal euglycemia (-180)  Hypothyroidism: continue levothyroxine    HEME/ONC:  VTE prophylaxis: [x] SCDs [x] chemoprophylaxis - lovenox 40 mg daily [] hold chemoprophylaxis due to: [x] high risk of DVT/PE on admission due to: brain tumor, doppler neg     ID:  Monitor for fever    MISC:  Psych: depression - continue Wellbutrin    SOCIAL/FAMILY:  [x] awaiting [] updated at bedside [] family meeting    CODE STATUS:  [x] Full Code [] DNR [] DNI [] Palliative/Comfort Care    DISPOSITION:  [x] ICU/PACU [] Stroke Unit [] Floor [] EMU [] RCU [] PCU    [x] Patient is at high risk of neurologic deterioration/death due to: brain compression, hemorrhage, ischemic stroke    Time spent: 35 [x] critical care minutes    Contact: [phone]

## 2019-09-25 NOTE — OCCUPATIONAL THERAPY INITIAL EVALUATION ADULT - LIVES WITH, PROFILE
lives with son/ in a house with 1 AMMON, and 7+7 inside the home. +shower stall with grab bars, +comfort height toilet. independent with all ADLs/IADLs including working and driving at baseline./spouse/children

## 2019-09-25 NOTE — OCCUPATIONAL THERAPY INITIAL EVALUATION ADULT - PLANNED THERAPY INTERVENTIONS, OT EVAL
cognitive, visual perceptual/balance training/transfer training/fine motor coordination training/ROM/strengthening/ADL retraining/IADL retraining/bed mobility training

## 2019-09-25 NOTE — PROGRESS NOTE ADULT - ASSESSMENT
PAD#2 angio/embo of meningioma  POD#1 crani tumor resection w/ 5-ALA    dark room x48hrs post op  OOB as tolerated in the room  suicidal ideation in the past, followed by outpt psych Dr. Mcclellan--continue recommended meds  decadron for cerebral edema  -140  encourage incentive spirometry as able  IVF  maintain euglycemia  cont synthroid  gorge op abxs   ble dopplers negative; lovenox ppx

## 2019-09-25 NOTE — PROGRESS NOTE ADULT - SUBJECTIVE AND OBJECTIVE BOX
POD1 (midline craniotomy, Nava grade 1-2 resection of recurrent falcine meningioma ligation SSS)  Patient feels well, awake, alert orientedx3. Complains of some blurry vision. No major headache. CN II-XII intact. 5/5 bilat Ue and LE. Sensation intact. POD1 (midline craniotomy, Nava grade 1-2 resection of recurrent falcine meningioma ligation SSS)  Patient feels well, awake, alert disoriented, somehwhat slow to respond. Complains of some blurry vision. No major headache. CN II-XII intact. 5/5 bilat Ue and LE. Sensation intact.

## 2019-09-25 NOTE — PROGRESS NOTE ADULT - SUBJECTIVE AND OBJECTIVE BOX
CTH stable    c/o blurry vision, no double vision, can finger count  Vitals/labs/meds/imaging reviewed    Awake, alert, fully oriented, follows commands, PERRL, EOMI, trace left facial asymmetry, tongue midline, no drift, full strength

## 2019-09-25 NOTE — OCCUPATIONAL THERAPY INITIAL EVALUATION ADULT - NS ASR FOLLOW COMMAND OT EVAL
increased processing time. difficulty with abstract ideas./100% of the time/able to follow single-step instructions

## 2019-09-25 NOTE — OCCUPATIONAL THERAPY INITIAL EVALUATION ADULT - DIAGNOSIS, OT EVAL
pt presents with increased processing time, BUE decreased AROM, BUE decreased strength, impaired balance and decreased initiation

## 2019-09-25 NOTE — OCCUPATIONAL THERAPY INITIAL EVALUATION ADULT - PERTINENT HX OF CURRENT PROBLEM, REHAB EVAL
63yo F, complaints of swelling on the top of her head. MRI of the brain revealed a midline frontal meningioma. near complete vascular embolization on 9/23/19 and planned resection on 9/24/19.

## 2019-09-25 NOTE — PROGRESS NOTE ADULT - SUBJECTIVE AND OBJECTIVE BOX
Patient seen and examined at bedside.    --Anticoagulation--    T(C): 36.6 (09-25-19 @ 07:00), Max: 37 (09-24-19 @ 23:00)  HR: 72 (09-25-19 @ 08:00) (57 - 88)  BP: --  RR: 26 (09-25-19 @ 08:00) (12 - 26)  SpO2: 99% (09-25-19 @ 08:00) (95% - 100%)  Wt(kg): --    Exam:  AOx3,  FC, ROBLES 5/5

## 2019-09-25 NOTE — PROVIDER CONTACT NOTE (OTHER) - ASSESSMENT
Pt aox4, VSS. Pt complains of 9/10 headache. Pt also complains of blurry vision, able to count fingers

## 2019-09-25 NOTE — PROVIDER CONTACT NOTE (OTHER) - ACTION/TREATMENT ORDERED:
PA assessed at bedside. Oxycodone 5mg PO and continue to monitor. After pain meds, pt stated that her "blurry vision went away"

## 2019-09-25 NOTE — PROGRESS NOTE ADULT - SUBJECTIVE AND OBJECTIVE BOX
SUMMARY:  Mrs. Fajardo is a 64 year-old woman with history of hyperlipidemia, thyroid nodule status post thyroidectomy (1999), osteoarthritis status post spinal fusion (2003) and gel injections to both knees and meningioma status post resection (2003, at [place] with Dr. Nikita Davis) who noted swelling on top of her head  and was noted to have recurrent meningioma for which she underwent near complete vascular embolization on 9/23/19 and is planned for resection on 9/24/19. She reportedly has been very anxious about these procedures, even endorsing suicidal feelings, for which she has been regularly seeing her psychiatrist, Dr. Mcclellan, who has cleared her for the procedures.      Hospital course:     9/23: Angio/ embolization of meningioma  9/24: Craniotomy for meningioma resection with Gloelan          REVIEW OF SYSTEMS: [ ] Unable to Assess due to neurologic exam   [x ] All ROS addressed below are non-contributory, except:  Neuro: [ ] Headache [ ] Back pain [ ] Numbness [ ] Weakness [ ] Ataxia [ ] Dizziness [ ] Aphasia [ ] Dysarthria [ ] Visual disturbance  Resp: [ ] Shortness of breath/dyspnea, [ ] Orthopnea [ ] Cough  CV: [ ] Chest pain [ ] Palpitation [ ] Lightheadedness [ ] Syncope  Renal: [ ] Thirst [ ] Edema  GI: [ ] Nausea [ ] Emesis [ ] Abdominal pain [ ] Constipation [ ] Diarrhea  Hem: [ ] Hematemesis [ ] bright red blood per rectum  ID: [ ] Fever [ ] Chills [ ] Dysuria  ENT: [ ] Rhinorrhea          ICU Vital Signs Last 24 Hrs  T(C): 36.8 (25 Sep 2019 05:00), Max: 37 (24 Sep 2019 23:00)  T(F): 98.3 (25 Sep 2019 05:00), Max: 98.6 (24 Sep 2019 23:00)  HR: 68 (25 Sep 2019 06:00) (61 - 88)  BP: --  BP(mean): --  ABP: 139/67 (25 Sep 2019 06:00) (106/66 - 151/80)  ABP(mean): 96 (25 Sep 2019 06:00) (85 - 111)  RR: 18 (25 Sep 2019 06:00) (12 - 26)  SpO2: 97% (25 Sep 2019 06:00) (95% - 100%)      09-23-19 @ 07:01 - 09-24-19 @ 07:00  --------------------------------------------------------  IN: 1510 mL / OUT: 2070 mL / NET: -560 mL    09-24-19 @ 07:01  -  09-25-19 @ 06:36  --------------------------------------------------------  IN: 2565 mL / OUT: 2645 mL / NET: -80 mL        acetaminophen   Tablet .. 650 milliGRAM(s) Oral every 6 hours PRN  ARIPiprazole 2 milliGRAM(s) Oral every 24 hours  atorvastatin 40 milliGRAM(s) Oral at bedtime  buPROPion XL . 300 milliGRAM(s) Oral every 24 hours  chlorhexidine 4% Liquid 1 Application(s) Topical <User Schedule>  clonazePAM  Tablet 1 milliGRAM(s) Oral three times a day  dexamethasone  Injectable   IV Push   dexamethasone  Injectable 4 milliGRAM(s) IV Push every 6 hours  dexamethasone  Injectable 4 milliGRAM(s) IV Push every 8 hours  folic acid 1 milliGRAM(s) Oral daily  influenza   Vaccine 0.5 milliLiter(s) IntraMuscular once  levETIRAcetam 500 milliGRAM(s) Oral two times a day  levothyroxine 100 MICROGram(s) Oral daily  oxyCODONE    IR 5 milliGRAM(s) Oral every 4 hours PRN  pantoprazole    Tablet 40 milliGRAM(s) Oral before breakfast  sodium chloride 0.9% with potassium chloride 20 mEq/L 1000 milliLiter(s) (125 mL/Hr) IV Continuous <Continuous>                          11.9   21.3  )-----------( 278      ( 24 Sep 2019 15:53 )             37.1     09-24    145  |  113<H>  |  14  ----------------------------<  149<H>  3.9   |  20<L>  |  0.98    Ca    7.4<L>      24 Sep 2019 23:52  Phos  3.1     09-24  Mg     1.8     09-24    TPro  6.4  /  Alb  4.1  /  TBili  0.2  /  DBili  <0.1  /  AST  16  /  ALT  18  /  AlkPhos  73  09-23    LIVER FUNCTIONS - ( 23 Sep 2019 12:42 )  Alb: 4.1 g/dL / Pro: 6.4 g/dL / ALK PHOS: 73 U/L / ALT: 18 U/L / AST: 16 U/L / GGT: x           ABG - ( 24 Sep 2019 09:27 )  pH, Arterial: 7.38  pH, Blood: x     /  pCO2: 33    /  pO2: 204   / HCO3: 19    / Base Excess: -4.4  /  SaO2: 99                    ICU Vital Signs Last 24 Hrs  T(C): 36.8 (25 Sep 2019 05:00), Max: 37 (24 Sep 2019 23:00)  T(F): 98.3 (25 Sep 2019 05:00), Max: 98.6 (24 Sep 2019 23:00)  HR: 68 (25 Sep 2019 06:00) (61 - 88)  ABP: 139/67 (25 Sep 2019 06:00) (106/66 - 151/80)  ABP(mean): 96 (25 Sep 2019 06:00) (85 - 111)  RR: 18 (25 Sep 2019 06:00) (12 - 26)  SpO2: 97% (25 Sep 2019 06:00) (95% - 100%)      09-23-19 @ 07:01  -  09-24-19 @ 07:00  --------------------------------------------------------  IN: 1510 mL / OUT: 2070 mL / NET: -560 mL    09-24-19 @ 07:01  -  09-25-19 @ 06:35  --------------------------------------------------------  IN: 2565 mL / OUT: 2645 mL / NET: -80 mL        acetaminophen   Tablet .. 650 milliGRAM(s) Oral every 6 hours PRN  ARIPiprazole 2 milliGRAM(s) Oral every 24 hours  atorvastatin 40 milliGRAM(s) Oral at bedtime  buPROPion XL . 300 milliGRAM(s) Oral every 24 hours  chlorhexidine 4% Liquid 1 Application(s) Topical <User Schedule>  clonazePAM  Tablet 1 milliGRAM(s) Oral three times a day  dexamethasone  Injectable   IV Push   dexamethasone  Injectable 4 milliGRAM(s) IV Push every 6 hours  dexamethasone  Injectable 4 milliGRAM(s) IV Push every 8 hours  folic acid 1 milliGRAM(s) Oral daily  influenza   Vaccine 0.5 milliLiter(s) IntraMuscular once  levETIRAcetam 500 milliGRAM(s) Oral two times a day  levothyroxine 100 MICROGram(s) Oral daily  oxyCODONE    IR 5 milliGRAM(s) Oral every 4 hours PRN  pantoprazole    Tablet 40 milliGRAM(s) Oral before breakfast  sodium chloride 0.9% with potassium chloride 20 mEq/L 1000 milliLiter(s) (125 mL/Hr) IV Continuous <Continuous>                          11.9   21.3  )-----------( 278      ( 24 Sep 2019 15:53 )             37.1     09-24    145  |  113<H>  |  14  ----------------------------<  149<H>  3.9   |  20<L>  |  0.98    Ca    7.4<L>      24 Sep 2019 23:52  Phos  3.1     09-24  Mg     1.8     09-24    TPro  6.4  /  Alb  4.1  /  TBili  0.2  /  DBili  <0.1  /  AST  16  /  ALT  18  /  AlkPhos  73  09-23    LIVER FUNCTIONS - ( 23 Sep 2019 12:42 )  Alb: 4.1 g/dL / Pro: 6.4 g/dL / ALK PHOS: 73 U/L / ALT: 18 U/L / AST: 16 U/L / GGT: x           ABG - ( 24 Sep 2019 09:27 )  pH, Arterial: 7.38  pH, Blood: x     /  pCO2: 33    /  pO2: 204   / HCO3: 19    / Base Excess: -4.4  /  SaO2: 99                EXAMINATION:  General: No acute distress  HEENT: Anicteric sclerae  Cardiac: [id number]  Lungs: Clear  Abdomen: Soft, non-tender, +BS  Extremities: No c/c/e, no groin hematoma, good distal pulses  Skin/Incision Site: Clean, dry and intact  Neurologic: Awake, alert, fully oriented, follows commands, PERRL, EOMI, trace left facial asymmetry, tongue midline, no drift, full strength   Psych: Flat affect but denies suicidal/homocidal ideation SUMMARY:  Mrs. Fajardo is a 64 year-old woman with history of hyperlipidemia, thyroid nodule status post thyroidectomy (1999), osteoarthritis status post spinal fusion (2003) and gel injections to both knees and meningioma status post resection (2003, at [place] with Dr. Nikita Davis) who noted swelling on top of her head  and was noted to have recurrent meningioma for which she underwent near complete vascular embolization on 9/23/19 and is planned for resection on 9/24/19. She reportedly has been very anxious about these procedures, even endorsing suicidal feelings, for which she has been regularly seeing her psychiatrist, Dr. Mcclellan, who has cleared her for the procedures.    PAST MEDICAL & SURGICAL HISTORY:  HLD (hyperlipidemia)  Thyroid nodule: 1999  OA (osteoarthritis)  Depression with anxiety  S/P resection of meningioma: 2003  S/P thyroidectomy: 1999 for nodule  S/P lumbar spinal fusion: 2003    Allergies    penicillin (Hives)    Intolerances    aminolevulinic acid topical (Other)  fluoroquinolone antibiotics (Other)  griseofulvin (Other)  phenothiazines (Other)  sulfonamides (Other)  sulfonylureas (Other)  tetracycline containing compounds (Other)  tetracyclines (Other)  thiazide diuretics (Other)        Hospital course:     9/23: Angio/ embolization of meningioma  9/24: Craniotomy for meningioma resection with Gloelan    9/25: visual hallucinations        REVIEW OF SYSTEMS: [ ] Unable to Assess due to neurologic exam   [x ] All ROS addressed below are non-contributory, except:  Neuro: [ ] Headache [ ] Back pain [ ] Numbness [ ] Weakness [ ] Ataxia [ ] Dizziness [ ] Aphasia [ ] Dysarthria [ ] Visual disturbance  Resp: [ ] Shortness of breath/dyspnea, [ ] Orthopnea [ ] Cough  CV: [ ] Chest pain [ ] Palpitation [ ] Lightheadedness [ ] Syncope  Renal: [ ] Thirst [ ] Edema  GI: [ ] Nausea [ ] Emesis [ ] Abdominal pain [ ] Constipation [ ] Diarrhea  Hem: [ ] Hematemesis [ ] bright red blood per rectum  ID: [ ] Fever [ ] Chills [ ] Dysuria  ENT: [ ] Rhinorrhea          ICU Vital Signs Last 24 Hrs  T(C): 36.8 (25 Sep 2019 05:00), Max: 37 (24 Sep 2019 23:00)  T(F): 98.3 (25 Sep 2019 05:00), Max: 98.6 (24 Sep 2019 23:00)  HR: 68 (25 Sep 2019 06:00) (61 - 88)  BP: --  BP(mean): --  ABP: 139/67 (25 Sep 2019 06:00) (106/66 - 151/80)  ABP(mean): 96 (25 Sep 2019 06:00) (85 - 111)  RR: 18 (25 Sep 2019 06:00) (12 - 26)  SpO2: 97% (25 Sep 2019 06:00) (95% - 100%)      09-23-19 @ 07:01  -  09-24-19 @ 07:00  --------------------------------------------------------  IN: 1510 mL / OUT: 2070 mL / NET: -560 mL    09-24-19 @ 07:01  -  09-25-19 @ 06:36  --------------------------------------------------------  IN: 2565 mL / OUT: 2645 mL / NET: -80 mL        acetaminophen   Tablet .. 650 milliGRAM(s) Oral every 6 hours PRN  ARIPiprazole 2 milliGRAM(s) Oral every 24 hours  atorvastatin 40 milliGRAM(s) Oral at bedtime  buPROPion XL . 300 milliGRAM(s) Oral every 24 hours  chlorhexidine 4% Liquid 1 Application(s) Topical <User Schedule>  clonazePAM  Tablet 1 milliGRAM(s) Oral three times a day  dexamethasone  Injectable   IV Push   dexamethasone  Injectable 4 milliGRAM(s) IV Push every 6 hours  dexamethasone  Injectable 4 milliGRAM(s) IV Push every 8 hours  folic acid 1 milliGRAM(s) Oral daily  influenza   Vaccine 0.5 milliLiter(s) IntraMuscular once  levETIRAcetam 500 milliGRAM(s) Oral two times a day  levothyroxine 100 MICROGram(s) Oral daily  oxyCODONE    IR 5 milliGRAM(s) Oral every 4 hours PRN  pantoprazole    Tablet 40 milliGRAM(s) Oral before breakfast  sodium chloride 0.9% with potassium chloride 20 mEq/L 1000 milliLiter(s) (125 mL/Hr) IV Continuous <Continuous>                          11.9   21.3  )-----------( 278      ( 24 Sep 2019 15:53 )             37.1     09-24    145  |  113<H>  |  14  ----------------------------<  149<H>  3.9   |  20<L>  |  0.98    Ca    7.4<L>      24 Sep 2019 23:52  Phos  3.1     09-24  Mg     1.8     09-24    TPro  6.4  /  Alb  4.1  /  TBili  0.2  /  DBili  <0.1  /  AST  16  /  ALT  18  /  AlkPhos  73  09-23    LIVER FUNCTIONS - ( 23 Sep 2019 12:42 )  Alb: 4.1 g/dL / Pro: 6.4 g/dL / ALK PHOS: 73 U/L / ALT: 18 U/L / AST: 16 U/L / GGT: x           ABG - ( 24 Sep 2019 09:27 )  pH, Arterial: 7.38  pH, Blood: x     /  pCO2: 33    /  pO2: 204   / HCO3: 19    / Base Excess: -4.4  /  SaO2: 99                            11.9   21.3  )-----------( 278      ( 24 Sep 2019 15:53 )             37.1     09-24    145  |  113<H>  |  14  ----------------------------<  149<H>  3.9   |  20<L>  |  0.98    Ca    7.4<L>      24 Sep 2019 23:52  Phos  3.1     09-24  Mg     1.8     09-24    TPro  6.4  /  Alb  4.1  /  TBili  0.2  /  DBili  <0.1  /  AST  16  /  ALT  18  /  AlkPhos  73  09-23    LIVER FUNCTIONS - ( 23 Sep 2019 12:42 )  Alb: 4.1 g/dL / Pro: 6.4 g/dL / ALK PHOS: 73 U/L / ALT: 18 U/L / AST: 16 U/L / GGT: x           ABG - ( 24 Sep 2019 09:27 )  pH, Arterial: 7.38  pH, Blood: x     /  pCO2: 33    /  pO2: 204   / HCO3: 19    / Base Excess: -4.4  /  SaO2: 99                EXAMINATION:  General: No acute distress  HEENT: Anicteric sclerae  Cardiac: [id number]  Lungs: Clear  Abdomen: Soft, non-tender, +BS  Extremities: No c/c/e, no groin hematoma, good distal pulses  Skin/Incision Site: Clean, dry and intact  Neurologic: Awake, alert, fully oriented, follows commands, PERRL, EOMI, trace left facial asymmetry, tongue midline, no drift, full strength   Psych: Flat affect but denies suicidal/homocidal ideation SUMMARY:  Mrs. Fajardo is a 64 year-old woman with history of hyperlipidemia, thyroid nodule status post thyroidectomy (1999), osteoarthritis status post spinal fusion (2003) and gel injections to both knees and meningioma status post resection (2003, at [place] with Dr. Nikita Davis) who noted swelling on top of her head  and was noted to have recurrent meningioma for which she underwent near complete vascular embolization on 9/23/19 and is planned for resection on 9/24/19. She reportedly has been very anxious about these procedures, even endorsing suicidal feelings, for which she has been regularly seeing her psychiatrist, Dr. Mcclellan, who has cleared her for the procedures.    PAST MEDICAL & SURGICAL HISTORY:  HLD (hyperlipidemia)  Thyroid nodule: 1999  OA (osteoarthritis)  Depression with anxiety  S/P resection of meningioma: 2003  S/P thyroidectomy: 1999 for nodule  S/P lumbar spinal fusion: 2003    Allergies    penicillin (Hives)    Intolerances    aminolevulinic acid topical (Other)  fluoroquinolone antibiotics (Other)  griseofulvin (Other)  phenothiazines (Other)  sulfonamides (Other)  sulfonylureas (Other)  tetracycline containing compounds (Other)  tetracyclines (Other)  thiazide diuretics (Other)        Hospital course:     9/23: Angio/ embolization of meningioma  9/24: Craniotomy for meningioma resection with Gloelan    9/25: visual hallucinations        REVIEW OF SYSTEMS: [ ] Unable to Assess due to neurologic exam   [x ] All ROS addressed below are non-contributory, except:  Neuro: [ ] Headache [ ] Back pain [ ] Numbness [ ] Weakness [ ] Ataxia [ ] Dizziness [ ] Aphasia [ ] Dysarthria [ ] Visual disturbance  Resp: [ ] Shortness of breath/dyspnea, [ ] Orthopnea [ ] Cough  CV: [ ] Chest pain [ ] Palpitation [ ] Lightheadedness [ ] Syncope  Renal: [ ] Thirst [ ] Edema  GI: [ ] Nausea [ ] Emesis [ ] Abdominal pain [ ] Constipation [ ] Diarrhea  Hem: [ ] Hematemesis [ ] bright red blood per rectum  ID: [ ] Fever [ ] Chills [ ] Dysuria  ENT: [ ] Rhinorrhea          ICU Vital Signs Last 24 Hrs  T(C): 36.8 (25 Sep 2019 05:00), Max: 37 (24 Sep 2019 23:00)  T(F): 98.3 (25 Sep 2019 05:00), Max: 98.6 (24 Sep 2019 23:00)  HR: 68 (25 Sep 2019 06:00) (61 - 88)  BP: --  BP(mean): --  ABP: 139/67 (25 Sep 2019 06:00) (106/66 - 151/80)  ABP(mean): 96 (25 Sep 2019 06:00) (85 - 111)  RR: 18 (25 Sep 2019 06:00) (12 - 26)  SpO2: 97% (25 Sep 2019 06:00) (95% - 100%)      09-23-19 @ 07:01  -  09-24-19 @ 07:00  --------------------------------------------------------  IN: 1510 mL / OUT: 2070 mL / NET: -560 mL    09-24-19 @ 07:01  -  09-25-19 @ 06:36  --------------------------------------------------------  IN: 2565 mL / OUT: 2645 mL / NET: -80 mL        acetaminophen   Tablet .. 650 milliGRAM(s) Oral every 6 hours PRN  ARIPiprazole 2 milliGRAM(s) Oral every 24 hours  atorvastatin 40 milliGRAM(s) Oral at bedtime  buPROPion XL . 300 milliGRAM(s) Oral every 24 hours  chlorhexidine 4% Liquid 1 Application(s) Topical <User Schedule>  clonazePAM  Tablet 1 milliGRAM(s) Oral three times a day  dexamethasone  Injectable   IV Push   dexamethasone  Injectable 4 milliGRAM(s) IV Push every 6 hours  dexamethasone  Injectable 4 milliGRAM(s) IV Push every 8 hours  folic acid 1 milliGRAM(s) Oral daily  influenza   Vaccine 0.5 milliLiter(s) IntraMuscular once  levETIRAcetam 500 milliGRAM(s) Oral two times a day  levothyroxine 100 MICROGram(s) Oral daily  oxyCODONE    IR 5 milliGRAM(s) Oral every 4 hours PRN  pantoprazole    Tablet 40 milliGRAM(s) Oral before breakfast  sodium chloride 0.9% with potassium chloride 20 mEq/L 1000 milliLiter(s) (125 mL/Hr) IV Continuous <Continuous>                          11.9   21.3  )-----------( 278      ( 24 Sep 2019 15:53 )             37.1     09-24    145  |  113<H>  |  14  ----------------------------<  149<H>  3.9   |  20<L>  |  0.98    Ca    7.4<L>      24 Sep 2019 23:52  Phos  3.1     09-24  Mg     1.8     09-24    TPro  6.4  /  Alb  4.1  /  TBili  0.2  /  DBili  <0.1  /  AST  16  /  ALT  18  /  AlkPhos  73  09-23    LIVER FUNCTIONS - ( 23 Sep 2019 12:42 )  Alb: 4.1 g/dL / Pro: 6.4 g/dL / ALK PHOS: 73 U/L / ALT: 18 U/L / AST: 16 U/L / GGT: x           ABG - ( 24 Sep 2019 09:27 )  pH, Arterial: 7.38  pH, Blood: x     /  pCO2: 33    /  pO2: 204   / HCO3: 19    / Base Excess: -4.4  /  SaO2: 99                            11.9   21.3  )-----------( 278      ( 24 Sep 2019 15:53 )             37.1     09-24    145  |  113<H>  |  14  ----------------------------<  149<H>  3.9   |  20<L>  |  0.98    Ca    7.4<L>      24 Sep 2019 23:52  Phos  3.1     09-24  Mg     1.8     09-24    TPro  6.4  /  Alb  4.1  /  TBili  0.2  /  DBili  <0.1  /  AST  16  /  ALT  18  /  AlkPhos  73  09-23    LIVER FUNCTIONS - ( 23 Sep 2019 12:42 )  Alb: 4.1 g/dL / Pro: 6.4 g/dL / ALK PHOS: 73 U/L / ALT: 18 U/L / AST: 16 U/L / GGT: x           ABG - ( 24 Sep 2019 09:27 )  pH, Arterial: 7.38  pH, Blood: x     /  pCO2: 33    /  pO2: 204   / HCO3: 19    / Base Excess: -4.4  /  SaO2: 99                EXAMINATION:  General: No acute distress  HEENT: Anicteric sclerae  Cardiac: [id number]  Lungs: Clear  Abdomen: Soft, non-tender, +BS  Extremities: No c/c/e, no groin hematoma, good distal pulses  Skin/Incision Site: Clean, dry and intact  Neurologic: Awake, alert, fully oriented, follows commands, PERRL, EOMI, trace left facial asymmetry, tongue midline, no drift, full strength   Psych: Flat affect but denies suicidal/homocidal ideation

## 2019-09-25 NOTE — PHARMACOTHERAPY INTERVENTION NOTE - COMMENTS
Spoke with Dr. Mcclellan, patient's psychiatristluciana Spoke with Dr. Mcclellan, patient's psychiatrist and confirmed patient is on doxepine for sleep. Clarified with patient's  her home medications include  bupropion  mg daily  for depression  clonazepam 1 mg tid for anxiety  folic acid 800 mcg daily  synthroid 100 mcg daily  lipitor 40 mg daily  abilify 2 mg daily for depression   doxepine 50 mg HS for sleep

## 2019-09-25 NOTE — OCCUPATIONAL THERAPY INITIAL EVALUATION ADULT - VISUAL ASSESSMENT: TRACKING
unable to track at this time, unable to deteremine if difficulty following abstract command or due to visual impairment. continued assessment required.

## 2019-09-25 NOTE — PROGRESS NOTE ADULT - ASSESSMENT
s/p  craniotomy for resection of tumor     - Light restriction for 48 hrs  - CTH this AM  - MRI x 48hrs  - Dexamethasone taper

## 2019-09-25 NOTE — OCCUPATIONAL THERAPY INITIAL EVALUATION ADULT - RANGE OF MOTION EXAMINATION, UPPER EXTREMITY
AROM shoulder flexion to roughly 45 degrees, AAROM WNL. elbow AROM WNL, wrist and digits WFL within edema./bilateral UE Active Assistive ROM was WNL (within normal limits)

## 2019-09-26 DIAGNOSIS — F41.8 OTHER SPECIFIED ANXIETY DISORDERS: ICD-10-CM

## 2019-09-26 DIAGNOSIS — G93.40 ENCEPHALOPATHY, UNSPECIFIED: ICD-10-CM

## 2019-09-26 DIAGNOSIS — K59.00 CONSTIPATION, UNSPECIFIED: ICD-10-CM

## 2019-09-26 DIAGNOSIS — F33.41 MAJOR DEPRESSIVE DISORDER, RECURRENT, IN PARTIAL REMISSION: ICD-10-CM

## 2019-09-26 DIAGNOSIS — F05 DELIRIUM DUE TO KNOWN PHYSIOLOGICAL CONDITION: ICD-10-CM

## 2019-09-26 DIAGNOSIS — E03.9 HYPOTHYROIDISM, UNSPECIFIED: ICD-10-CM

## 2019-09-26 DIAGNOSIS — D32.9 BENIGN NEOPLASM OF MENINGES, UNSPECIFIED: ICD-10-CM

## 2019-09-26 PROCEDURE — 99222 1ST HOSP IP/OBS MODERATE 55: CPT

## 2019-09-26 PROCEDURE — 99221 1ST HOSP IP/OBS SF/LOW 40: CPT

## 2019-09-26 PROCEDURE — 99223 1ST HOSP IP/OBS HIGH 75: CPT

## 2019-09-26 RX ORDER — CLONAZEPAM 1 MG
1 TABLET ORAL
Refills: 0 | Status: DISCONTINUED | OUTPATIENT
Start: 2019-09-26 | End: 2019-09-27

## 2019-09-26 RX ORDER — DEXAMETHASONE 0.5 MG/5ML
3 ELIXIR ORAL EVERY 8 HOURS
Refills: 0 | Status: COMPLETED | OUTPATIENT
Start: 2019-09-26 | End: 2019-09-27

## 2019-09-26 RX ORDER — DEXAMETHASONE 0.5 MG/5ML
2 ELIXIR ORAL EVERY 12 HOURS
Refills: 0 | Status: COMPLETED | OUTPATIENT
Start: 2019-09-28 | End: 2019-09-29

## 2019-09-26 RX ORDER — DEXAMETHASONE 0.5 MG/5ML
2 ELIXIR ORAL EVERY 8 HOURS
Refills: 0 | Status: COMPLETED | OUTPATIENT
Start: 2019-09-27 | End: 2019-09-28

## 2019-09-26 RX ORDER — OXYCODONE HYDROCHLORIDE 5 MG/1
10 TABLET ORAL EVERY 4 HOURS
Refills: 0 | Status: DISCONTINUED | OUTPATIENT
Start: 2019-09-26 | End: 2019-09-26

## 2019-09-26 RX ORDER — DEXAMETHASONE 0.5 MG/5ML
ELIXIR ORAL
Refills: 0 | Status: COMPLETED | OUTPATIENT
Start: 2019-09-26 | End: 2019-09-29

## 2019-09-26 RX ADMIN — DIVALPROEX SODIUM 500 MILLIGRAM(S): 500 TABLET, DELAYED RELEASE ORAL at 14:51

## 2019-09-26 RX ADMIN — DIVALPROEX SODIUM 500 MILLIGRAM(S): 500 TABLET, DELAYED RELEASE ORAL at 05:02

## 2019-09-26 RX ADMIN — Medication 4 MILLIGRAM(S): at 05:02

## 2019-09-26 RX ADMIN — DIVALPROEX SODIUM 500 MILLIGRAM(S): 500 TABLET, DELAYED RELEASE ORAL at 22:10

## 2019-09-26 RX ADMIN — ARIPIPRAZOLE 2 MILLIGRAM(S): 15 TABLET ORAL at 05:03

## 2019-09-26 RX ADMIN — Medication 3 MILLIGRAM(S): at 22:11

## 2019-09-26 RX ADMIN — Medication 1 MILLIGRAM(S): at 05:02

## 2019-09-26 RX ADMIN — Medication 1 MILLIGRAM(S): at 22:10

## 2019-09-26 RX ADMIN — Medication 100 MICROGRAM(S): at 05:02

## 2019-09-26 RX ADMIN — OXYCODONE HYDROCHLORIDE 5 MILLIGRAM(S): 5 TABLET ORAL at 12:23

## 2019-09-26 RX ADMIN — ATORVASTATIN CALCIUM 40 MILLIGRAM(S): 80 TABLET, FILM COATED ORAL at 22:10

## 2019-09-26 RX ADMIN — PANTOPRAZOLE SODIUM 40 MILLIGRAM(S): 20 TABLET, DELAYED RELEASE ORAL at 11:22

## 2019-09-26 RX ADMIN — OXYCODONE HYDROCHLORIDE 5 MILLIGRAM(S): 5 TABLET ORAL at 13:00

## 2019-09-26 RX ADMIN — DEXTROSE MONOHYDRATE, SODIUM CHLORIDE, AND POTASSIUM CHLORIDE 125 MILLILITER(S): 50; .745; 4.5 INJECTION, SOLUTION INTRAVENOUS at 05:08

## 2019-09-26 RX ADMIN — ENOXAPARIN SODIUM 40 MILLIGRAM(S): 100 INJECTION SUBCUTANEOUS at 22:11

## 2019-09-26 RX ADMIN — Medication 1 MILLIGRAM(S): at 14:51

## 2019-09-26 RX ADMIN — Medication 3 MILLIGRAM(S): at 14:51

## 2019-09-26 RX ADMIN — Medication 1 MILLIGRAM(S): at 12:23

## 2019-09-26 RX ADMIN — BUPROPION HYDROCHLORIDE 300 MILLIGRAM(S): 150 TABLET, EXTENDED RELEASE ORAL at 05:03

## 2019-09-26 NOTE — PHYSICAL THERAPY INITIAL EVALUATION ADULT - CRITERIA FOR SKILLED THERAPEUTIC INTERVENTIONS
impairments found/rehab potential/therapy frequency/anticipated discharge recommendation/functional limitations in following categories/risk reduction/prevention/predicted duration of therapy intervention/anticipated equipment needs at discharge

## 2019-09-26 NOTE — PHYSICAL THERAPY INITIAL EVALUATION ADULT - IMPAIRMENTS CONTRIBUTING IMPAIRED BED MOBILITY, REHAB EVAL
cognition/impaired coordination/decreased sensation/impaired sensory feedback/impaired motor control/impaired postural control/decreased strength/impaired balance

## 2019-09-26 NOTE — CONSULT NOTE ADULT - ATTENDING COMMENTS
Seen and examined with Fellow. Agree with note.   Patient will need acute rehabilitation when stable.  D/W with patient and her daughter rehab options and functional prognosis.
discussed with  and son at bedside.

## 2019-09-26 NOTE — BEHAVIORAL HEALTH ASSESSMENT NOTE - EMPLOYMENT
s/p reversal of tiny Reversal of colostomy Reversal of colostomy Reversal of colostomy Reversal of colostomy Surgery colocolostomy Employed

## 2019-09-26 NOTE — BEHAVIORAL HEALTH ASSESSMENT NOTE - NSBHCHARTREVIEWIMAGING_PSY_A_CORE FT
< from: CT Head No Cont (09.25.19 @ 08:11) >    EXAM:  CT BRAIN                          PROCEDURE DATE:  09/25/2019      INTERPRETATION:  CLINICAL INFORMATION: Status post craniotomy for falcine   meningioma.    TECHNIQUE: Noncontrast axial CT images were acquired through the head.   Imaging was performed on the portable unit.    COMPARISON STUDY: IR neuro procedure 9/23/2019; MR brain 8/13/2019.    FINDINGS:     Patient is status post by parietal craniotomy with a left frontal   craniectomy and mesh cranioplasty.. Hypodensity and foci of air are   present in the region of the previously identified meningioma. Bifrontal   extra-axial air is postsurgical. High midline hyperdense material is   present, representing minimal hemorrhage versus postsurgical material.    No acute intracranial hemorrhage, mass effect or midline shift.    Ventricles and cortical sulci are within normal limits.       IMPRESSION:     Status post resection of midline frontal meningioma with expected   postoperative changes.                YVONNE GUZMAN M.D., RADIOLOGY RESIDENT  This document has been electronically signed.  ALTA SANCHEZ M.D., ATTENDING RADIOLOGIST  This document has been anjelica    < end of copied text >    ctronically signed. Sep 25 2019 10:11AM

## 2019-09-26 NOTE — CONSULT NOTE ADULT - PROBLEM SELECTOR RECOMMENDATION 4
psych follow up- continue home meds, but try to decrease Klonopin to BID dosing to help with mentation

## 2019-09-26 NOTE — PROGRESS NOTE ADULT - ASSESSMENT
ASSESSMENT/PLAN: Meningioma, s/p embolization from near complete embolization, s/p craniotomy and meningioma resection 9/24    NEURO:  Meningioma s/p resection  Q4 neuro checks  No longer needs to be in the dark   Pain control  Valproic acid   vasogenic edema on decadron taper   Psych: depression - continue Wellbutrin, call Psych consult today  Activity: [x] mobilize as tolerated [] Bedrest  [x] PT [x] OT     PULM:  Incentive spirometry    CV:  SBP goal 100-140mmHg  HLD: statin    RENAL:  Cr nl  Fluids:  NS at 125 ml/hr , will IVL today     GI:  Diet: regular diet   Bowel regimen     ENDO:   Goal euglycemia (-180)  Hypothyroidism: continue PO levothyroxine    HEME/ONC:  VTE prophylaxis: [x] SCDs [x] chemoprophylaxis - lovenox 40 mg daily   [x] high risk of DVT/PE on admission due to: brain tumor, doppler neg     ID:  Monitor for fever    MISC:      SOCIAL/FAMILY:  [x] awaiting [] updated at bedside [] family meeting    CODE STATUS:  [x] Full Code [] DNR [] DNI [] Palliative/Comfort Care    DISPOSITION:  Floor     [x] Patient is at high risk of neurologic deterioration/death due to: brain compression, hemorrhage, ischemic stroke    Time spent: 45 [x] critical care minutes    Contact: [phone]

## 2019-09-26 NOTE — BEHAVIORAL HEALTH ASSESSMENT NOTE - RISK ASSESSMENT
Low Acute Suicide Risk risk factor include recent SI, h/o suicide attempts, medical issues   protective factors include no HIIP/AVH, no clear intent, plan, no hx of substance abuse, no psychosis, engaged in work, domiciled, intact marriage, social supports, future oriented . pt overall low acute risk for suicide attempt

## 2019-09-26 NOTE — PROGRESS NOTE ADULT - SUBJECTIVE AND OBJECTIVE BOX
64 year-old woman with history of hyperlipidemia, thyroid nodule status post thyroidectomy (1999), osteoarthritis status post spinal fusion (2003) and gel injections to both knees and meningioma status post resection (2003, at [place] with Dr. Nikita Davis) who noted swelling on top of her head  and was noted to have recurrent meningioma for which she underwent near complete vascular embolization on 9/23/19 and is planned for resection on 9/24/19. She reportedly has been very anxious about these procedures, even endorsing suicidal feelings, for which she has been regularly seeing her psychiatrist, Dr. Mcclellan, who has cleared her for the procedures.    Hospital course:     9/23: Angio/ embolization of meningioma  9/24: Craniotomy for meningioma resection with Gloelan    9/25: visual hallucinations  9/26:     VITALS:  Reviewed   LABS:  Reviewed  MEDICATIONS: Reviewed  IMAGING:  Recent imaging studies were reviewed.      EXAMINATION:  General: No acute distress  HEENT: Anicteric sclerae  Cardiac: [id number]  Lungs: Clear  Abdomen: Soft, non-tender, +BS  Extremities: No c/c/e, no groin hematoma, good distal pulses  Skin/Incision Site: Clean, dry and intact  Neurologic: Awake, alert, fully oriented, follows commands, PERRL, EOMI, trace left facial asymmetry, tongue midline, no drift, full strength   Psych: Flat affect but denies suicidal/homocidal ideation

## 2019-09-26 NOTE — PHYSICAL THERAPY INITIAL EVALUATION ADULT - PERTINENT HX OF CURRENT PROBLEM, REHAB EVAL
64 year old woman with PMH HLD, thyroid nodule s/p thyroidectomy 1999, osteoarthritis s/p spinal fusion in 2003 and gel injections to both knees and brain hemangioma s/p resection in 2003 who noted swelling on top of her head and went to PCP then neurosurgeon now scheduled for cerebral angiography with pipeline stent on 9/23/19 followed by midline craniotomy for tumor resection the following day on 9/24/19.

## 2019-09-26 NOTE — PHYSICAL THERAPY INITIAL EVALUATION ADULT - TRANSFER SAFETY CONCERNS NOTED: SIT/STAND, REHAB EVAL
decreased step length/decreased weight-shifting ability/decreased safety awareness/losing balance/decreased sequencing ability/decreased balance during turns

## 2019-09-26 NOTE — BEHAVIORAL HEALTH ASSESSMENT NOTE - HPI (INCLUDE ILLNESS QUALITY, SEVERITY, DURATION, TIMING, CONTEXT, MODIFYING FACTORS, ASSOCIATED SIGNS AND SYMPTOMS)
63yo  female, , domiciled with , has 5 adult children (45,42,39,38,28), employed as a , currently in psych tx with dr. hu [phone] since 2003, current psych meds include abilify 2mg qn, wellbutrin 300mg qn, klonopin 1mg qn, doxepin 50mg qn, +PPHx of depression and 2 suicide attempts (overdose on benzo. 2003), no h/o violence or substance abuse, + PMhx for HLD, thyroid nodule s/p thyroidectomy 1999, osteoarthritis s/p spinal fusion in 2003 and gel injections to both knees and brain hemangioma s/p resection in 2003. pt admitted for selective cerebral angiography and embolization on 9/23. Psych CL for pt c/o depression and anxiety, SI, flat affect.     pt was examined at bedside resting comfortably in no visible distress with a flat affect. Pt is A&Ox3, attention impaired, overall poor historian, able to answer some questions directly, during the interview, the pt appeared to have difficulty expressing herself and difficulty finding the words to say. pt reports recently not sleeping well, low energy, no appetite, difficulty concentrating, and feelings of guilt and hopelessness. she states she feels guilty for having the meningioma operation and guilty for having her children and  see her in the hospital. Pt reported feeling depressed and anxious prior the surgery. she admits to having suicidal ideations, stating that she has thought about killing herself but has no intent or plan. she reports that her children prevents her from killing herself. when asked about her interests she reported that she enjoys traveling and she plans on going to [place] for her next trip. Pt denies psychosis, liz, hi. no agitation while in the hospital.     collateral was received by pt daughter Fabi ([phone]) who reports that prior to pt's surgery, the pt was completely "normal". No issues with speech or aphasia, or cognitive deficits. the daughter denies knowing about the pt's SI, stating that the pt was looking forward to her grandson's birthday party next week and had purchased a dress for it. She states pt was anxious, nervous about the upcoming surgery at least a month prior. She has no concerns for safety at this time.   collateral obtained from psychiatrist Dr. Hu, [phone], he states pt has long hx depression, anxiety, 2 prior suicide attempts. pt was in remission for the last 5 years, until recently pt became more anxious, depressed leading up to the brain surgery. Pt did express Si to him in the last month, however was amenable to psychotherapy. He has no concerns for safety at this time. no recent changes to meds.

## 2019-09-26 NOTE — PROGRESS NOTE ADULT - SUBJECTIVE AND OBJECTIVE BOX
POD2  Subjective: patient seems better oriented than yesterday, states her incisional pain is tolerable, no nausea:  Objective: Alert, orientedx2; CN II-XII intact. 5/5 bilateral UE and LE; Incision c/d/i  Plan: rapid reduction of steroid (3 days)

## 2019-09-26 NOTE — BEHAVIORAL HEALTH ASSESSMENT NOTE - SUICIDE PROTECTIVE FACTORS
Has future plans/Supportive social network of family or friends/Engaged in work or school/Identifies reasons for living

## 2019-09-26 NOTE — CONSULT NOTE ADULT - SUBJECTIVE AND OBJECTIVE BOX
Ubaldo Hansen MD, MBA ([facility] Hospitalist)  Page [phone]  (During off hours please page [phone])    *** INCOMPLETE. NOTE IN PROGRESS. ***    PMD:     HPI: 63 yo female with PMH of HLD, thyroid nodule s/p thyroidectomy, OA, spinal fusion in , brain hemangioma s/p resection in  presented with complaints of swelling on top of her head. MRI of the brain showed a large anterior parasagittal meningioma extending through the superior sagittal sinus and calvarium into the subcutaneous soft tissue. Patient is s/p selective cerebral angiography and embolization on  and craniotomy for meningioma resection on . Patient noted to have some hallucinations on . Medicine consulted for medical co-management.    PAST MEDICAL & SURGICAL HISTORY:  HLD (hyperlipidemia)  Thyroid nodule:   OA (osteoarthritis)  Depression with anxiety  S/P resection of meningioma:   S/P thyroidectomy:  for nodule  S/P lumbar spinal fusion:       REVIEW OF SYMPTOMS:  CONSTITUTIONAL: No fever, weight loss, or fatigue  EYES: No eye pain, visual disturbances, or discharge  ENMT:  No difficulty hearing, tinnitus, vertigo; No sinus or throat pain  NECK: No pain or stiffness  BREASTS: No pain, masses, or nipple discharge  RESPIRATORY: No cough, wheezing, chills or hemoptysis; No shortness of breath  CARDIOVASCULAR: No chest pain, palpitations, dizziness, or leg swelling  GASTROINTESTINAL: No abdominal or epigastric pain. No nausea, vomiting, or hematemesis; No diarrhea or constipation. No melena or hematochezia.  GENITOURINARY: No dysuria, frequency, hematuria, or incontinence  NEUROLOGICAL: No headaches, memory loss, loss of strength, numbness, or tremors  SKIN: No itching, burning, rashes, or lesions   LYMPH NODES: No enlarged glands  ENDOCRINE: No heat or cold intolerance; No hair loss  MUSCULOSKELETAL: No joint pain or swelling; No muscle, back, or extremity pain  PSYCHIATRIC: No depression, anxiety, mood swings, or difficulty sleeping  HEME/LYMPH: No easy bruising, or bleeding gums  ALLERGIC AND IMMUNOLOGIC: No hives or eczema    ALLERGIES:  penicillin (Hives)    SOCIAL HISTORY:       FAMILY HISTORY:  FH: lung cancer: Mother  in her 50s  FH: premature coronary heart disease: Father  at 55 from MI      HOME MEDICATIONS:  abilify 2mg daily  doxepin 50mg qHS  folic acid 0.8mg daily  klonopin 1mg TID  lipitor 40mg qHS  synthroid 100 mcg daily  wellbutrin XL 300mg daily    INPATIENT MEDICATIONS  (STANDING):  ARIPiprazole 2 milliGRAM(s) Oral every 24 hours  atorvastatin 40 milliGRAM(s) Oral at bedtime  buPROPion XL . 300 milliGRAM(s) Oral every 24 hours  clonazePAM  Tablet 1 milliGRAM(s) Oral three times a day  dexamethasone     Tablet 3 milliGRAM(s) Oral every 8 hours  diVALproex  milliGRAM(s) Oral every 8 hours  doxazosin 1 milliGRAM(s) Oral at bedtime  enoxaparin Injectable 40 milliGRAM(s) SubCutaneous  folic acid 1 milliGRAM(s) Oral daily  influenza   Vaccine 0.5 milliLiter(s) IntraMuscular once  levothyroxine 100 MICROGram(s) Oral daily  pantoprazole    Tablet 40 milliGRAM(s) Oral before breakfast    MEDICATIONS  (PRN):  acetaminophen   Tablet .. 650 milliGRAM(s) Oral every 6 hours PRN Mild Pain (1 - 3)  oxyCODONE    IR 5 milliGRAM(s) Oral every 4 hours PRN Moderate Pain (4 - 6)  oxyCODONE    IR 10 milliGRAM(s) Oral every 4 hours PRN Severe Pain (7 - 10)      Vital Signs Last 24 Hrs  T(C): 36.8 (26 Sep 2019 07:00), Max: 37.7 (25 Sep 2019 23:00)  T(F): 98.3 (26 Sep 2019 07:00), Max: 99.8 (25 Sep 2019 23:00)  HR: 96 (26 Sep 2019 12:38) (73 - 96)  BP: 133/95 (26 Sep 2019 12:38) (127/76 - 142/74)  BP(mean): 92 (26 Sep 2019 11:00) (92 - 99)  RR: 22 (26 Sep 2019 11:00) (14 - 22)  SpO2: 100% (26 Sep 2019 03:00) (98% - 100%)      I&O's Summary  25 Sep 2019 07:01  -  26 Sep 2019 07:00  --------------------------------------------------------  IN: 4475 mL / OUT: 4400 mL / NET: 75 mL    26 Sep 2019 07:01  -  26 Sep 2019 15:27  --------------------------------------------------------  IN: 125 mL / OUT: 1600 mL / NET: -1475 mL        PHYSICAL EXAM:  GENERAL: NAD  HEENT:  Atraumatic, Normocephalic, MMM, neck supple, no JVD  EYES: EOMI, PERRLA, conjunctiva and sclera clear  CHEST/LUNG: Clear to auscultation bilaterally; No wheeze, rhonchi or rales.  HEART: S1, S2, rrr; No murmurs, rubs, or gallops  ABDOMEN: Soft, Nontender, Nondistended; Bowel sounds present  EXTREMITIES:  2+ Peripheral Pulses, No clubbing, cyanosis, or edema  PSYCH: calm  NEUROLOGY: non-focal, AOx3  SKIN: No rashes or lesions    LABS:             10.7   13.7  )-----------( 206      ( 25 Sep 2019 21:52 )             32.9         145  |  114<H>  |  10  ----------------------------<  124<H>  4.1   |  21<L>  |  0.87    Ca    8.0<L>      25 Sep 2019 21:52  Phos  1.8       Mg     2.1           RADIOLOGY & ADDITIONAL TESTS:    Imaging Personally Reviewed:   CT Head : Status post resection of midline frontal meningioma with expected postoperative changes.  Duplex LE bilateral : No evidence of deep venous thrombosis in either lower extremity.    Consultant(s) Notes Reviewed:  Neurosurgery, Psychiatry    Care Discussed with Consultants/Other Providers: Neurosurgery re plan of care Ubaldo Hansen MD, MBA ([facility] Hospitalist)  Page [phone]  (During off hours please page [phone])    *** INCOMPLETE. NOTE IN PROGRESS. ***    PMD: Dr. Destiny Schaffer [phone]  Psychiatrist: Dr Martinez Mcclellan [phone]    HPI: 63 yo female with PMH of HLD, thyroid nodule s/p thyroidectomy, OA, spinal fusion in , brain hemangioma s/p resection in  presented with complaints of swelling on top of her head. MRI of the brain showed a large anterior parasagittal meningioma extending through the superior sagittal sinus and calvarium into the subcutaneous soft tissue. Patient is s/p selective cerebral angiography and embolization on  and craniotomy for meningioma resection on . Medicine consulted for medical co-management. Post-op the patient's mental status has changed- per the family she's very slow to answer questions and not able to focus. Patient's only complaint at this time is the headache, over the temporal region, 5/10 severity, improves to 0/10 with pain medications, not associated with nausea or vomiting. Per the family, no BM in 4 days.     PAST MEDICAL & SURGICAL HISTORY:  HLD (hyperlipidemia)  Thyroid nodule:   OA (osteoarthritis)  Depression with anxiety  S/P resection of meningioma:   S/P thyroidectomy:  for nodule  S/P lumbar spinal fusion:     REVIEW OF SYMPTOMS:  CONSTITUTIONAL: No fever, weight loss, or fatigue  EYES: No eye pain, visual disturbances, or discharge  ENMT:  No difficulty hearing, tinnitus, vertigo; No sinus or throat pain  NECK: No pain or stiffness  RESPIRATORY: No cough, wheezing, chills or hemoptysis; No shortness of breath  CARDIOVASCULAR: No chest pain, palpitations, dizziness, or leg swelling  GASTROINTESTINAL: No abdominal or epigastric pain. No nausea, vomiting, or hematemesis; No diarrhea. +Constipation. No melena or hematochezia.  GENITOURINARY: No dysuria, frequency, hematuria, or incontinence  NEUROLOGICAL: No headaches, memory loss, loss of strength, numbness, or tremors  SKIN: No itching, burning, rashes, or lesions   ENDOCRINE: No heat or cold intolerance; No hair loss  MUSCULOSKELETAL: No joint pain or swelling; No muscle, back, or extremity pain  PSYCHIATRIC: + chronic depression and anxiety  HEME/LYMPH: No easy bruising, or bleeding gums  ALLERGIC AND IMMUNOLOGIC: No hives or eczema    ALLERGIES:  penicillin (Hives)    SOCIAL HISTORY:   , lives at home with her . Independent.  No h/o smoking, alcohol, and illicit drug use.     FAMILY HISTORY:  Mother  of lung cancer - in her 50s  Father  at 55 from MI - Premature coronary heart disease    HOME MEDICATIONS:  abilify 2mg daily  doxepin 50mg qHS  folic acid 0.8mg daily  klonopin 1mg TID  lipitor 40mg qHS  synthroid 100 mcg daily  wellbutrin XL 300mg daily    INPATIENT MEDICATIONS  (STANDING):  ARIPiprazole 2 milliGRAM(s) Oral every 24 hours  atorvastatin 40 milliGRAM(s) Oral at bedtime  buPROPion XL . 300 milliGRAM(s) Oral every 24 hours  clonazePAM  Tablet 1 milliGRAM(s) Oral three times a day  dexamethasone     Tablet 3 milliGRAM(s) Oral every 8 hours  diVALproex  milliGRAM(s) Oral every 8 hours  doxazosin 1 milliGRAM(s) Oral at bedtime  enoxaparin Injectable 40 milliGRAM(s) SubCutaneous  folic acid 1 milliGRAM(s) Oral daily  influenza   Vaccine 0.5 milliLiter(s) IntraMuscular once  levothyroxine 100 MICROGram(s) Oral daily  pantoprazole    Tablet 40 milliGRAM(s) Oral before breakfast    MEDICATIONS  (PRN):  acetaminophen   Tablet .. 650 milliGRAM(s) Oral every 6 hours PRN Mild Pain (1 - 3)  oxyCODONE    IR 5 milliGRAM(s) Oral every 4 hours PRN Moderate Pain (4 - 6)  oxyCODONE    IR 10 milliGRAM(s) Oral every 4 hours PRN Severe Pain (7 - 10)      Vital Signs Last 24 Hrs  T(C): 36.8 (26 Sep 2019 07:00), Max: 37.7 (25 Sep 2019 23:00)  T(F): 98.3 (26 Sep 2019 07:00), Max: 99.8 (25 Sep 2019 23:00)  HR: 96 (26 Sep 2019 12:38) (73 - 96)  BP: 133/95 (26 Sep 2019 12:38) (127/76 - 142/74)  BP(mean): 92 (26 Sep 2019 11:00) (92 - 99)  RR: 22 (26 Sep 2019 11:00) (14 - 22)  SpO2: 100% (26 Sep 2019 03:00) (98% - 100%)      I&O's Summary  25 Sep 2019 07:  -  26 Sep 2019 07:00  --------------------------------------------------------  IN: 4475 mL / OUT: 4400 mL / NET: 75 mL    26 Sep 2019 07:01  -  26 Sep 2019 15:27  --------------------------------------------------------  IN: 125 mL / OUT: 1600 mL / NET: -1475 mL        PHYSICAL EXAM:  GENERAL: NAD  HEENT:  s/p meningioma resection- dressing covering the head/ surgical area. MMM, neck supple, no JVD  EYES: EOMI, PERRLA, conjunctiva and sclera clear  CHEST/LUNG: Clear to auscultation bilaterally; No wheeze, rhonchi or rales.  HEART: S1, S2, rrr; No murmurs, rubs, or gallops  ABDOMEN: Soft, Nontender, Nondistended; Bowel sounds present  EXTREMITIES:  No clubbing, cyanosis, or edema  PSYCH: calm, AOx3, very slow to answer questions  SKIN: No rashes or lesions      LABS:             10.7   13.7  )-----------( 206      ( 25 Sep 2019 21:52 )             32.9         145  |  114<H>  |  10  ----------------------------<  124<H>  4.1   |  21<L>  |  0.87    Ca    8.0<L>      25 Sep 2019 21:52  Phos  1.8       Mg     2.1           RADIOLOGY & ADDITIONAL TESTS:    Imaging Personally Reviewed:   CT Head : Status post resection of midline frontal meningioma with expected postoperative changes.  Duplex LE bilateral : No evidence of deep venous thrombosis in either lower extremity.    Consultant(s) Notes Reviewed:  Neurosurgery, Psychiatry    Care Discussed with Consultants/Other Providers: Neurosurgery re plan of care Ubaldo Hansen MD, MBA ([facility] Hospitalist)  Page [phone]  (During off hours please page [phone])  	  PMD: Dr. Destiny Schaffer [phone]  Psychiatrist: Dr Martinez Mcclellan [phone]    HPI: 65 yo female with PMH of HLD, thyroid nodule s/p thyroidectomy, OA, spinal fusion in , brain hemangioma s/p resection in  presented with complaints of swelling on top of her head. MRI of the brain showed a large anterior parasagittal meningioma extending through the superior sagittal sinus and calvarium into the subcutaneous soft tissue. Patient is s/p selective cerebral angiography and embolization on  and craniotomy for meningioma resection on . Medicine consulted for medical co-management. Post-op the patient's mental status has changed- per the family she's very slow to answer questions and not able to focus. Patient's only complaint at this time is the headache, over the temporal region, 5/10 severity, improves to 0/10 with pain medications, not associated with nausea or vomiting. Per the family, no BM in 4 days.     PAST MEDICAL & SURGICAL HISTORY:  HLD (hyperlipidemia)  Thyroid nodule:   OA (osteoarthritis)  Depression with anxiety  S/P resection of meningioma:   S/P thyroidectomy:  for nodule  S/P lumbar spinal fusion:     REVIEW OF SYMPTOMS:  CONSTITUTIONAL: No fever, weight loss, or fatigue  EYES: No eye pain, visual disturbances, or discharge  ENMT:  No difficulty hearing, tinnitus, vertigo; No sinus or throat pain  NECK: No pain or stiffness  RESPIRATORY: No cough, wheezing, chills or hemoptysis; No shortness of breath  CARDIOVASCULAR: No chest pain, palpitations, dizziness, or leg swelling  GASTROINTESTINAL: No abdominal or epigastric pain. No nausea, vomiting, or hematemesis; No diarrhea. +Constipation. No melena or hematochezia.  GENITOURINARY: No dysuria, frequency, hematuria, or incontinence  NEUROLOGICAL: No headaches, memory loss, loss of strength, numbness, or tremors  SKIN: No itching, burning, rashes, or lesions   ENDOCRINE: No heat or cold intolerance; No hair loss  MUSCULOSKELETAL: No joint pain or swelling; No muscle, back, or extremity pain  PSYCHIATRIC: + chronic depression and anxiety  HEME/LYMPH: No easy bruising, or bleeding gums  ALLERGIC AND IMMUNOLOGIC: No hives or eczema    ALLERGIES:  penicillin (Hives)    SOCIAL HISTORY:   , lives at home with her . Independent.  No h/o smoking, alcohol, and illicit drug use.     FAMILY HISTORY:  Mother  of lung cancer - in her 50s  Father  at 55 from MI - Premature coronary heart disease    HOME MEDICATIONS:  abilify 2mg daily  doxepin 50mg qHS  folic acid 0.8mg daily  klonopin 1mg TID  lipitor 40mg qHS  synthroid 100 mcg daily  wellbutrin XL 300mg daily    INPATIENT MEDICATIONS  (STANDING):  ARIPiprazole 2 milliGRAM(s) Oral every 24 hours  atorvastatin 40 milliGRAM(s) Oral at bedtime  buPROPion XL . 300 milliGRAM(s) Oral every 24 hours  clonazePAM  Tablet 1 milliGRAM(s) Oral three times a day  dexamethasone     Tablet 3 milliGRAM(s) Oral every 8 hours  diVALproex  milliGRAM(s) Oral every 8 hours  doxazosin 1 milliGRAM(s) Oral at bedtime  enoxaparin Injectable 40 milliGRAM(s) SubCutaneous  folic acid 1 milliGRAM(s) Oral daily  influenza   Vaccine 0.5 milliLiter(s) IntraMuscular once  levothyroxine 100 MICROGram(s) Oral daily  pantoprazole    Tablet 40 milliGRAM(s) Oral before breakfast    MEDICATIONS  (PRN):  acetaminophen   Tablet .. 650 milliGRAM(s) Oral every 6 hours PRN Mild Pain (1 - 3)  oxyCODONE    IR 5 milliGRAM(s) Oral every 4 hours PRN Moderate Pain (4 - 6)  oxyCODONE    IR 10 milliGRAM(s) Oral every 4 hours PRN Severe Pain (7 - 10)      Vital Signs Last 24 Hrs  T(C): 36.8 (26 Sep 2019 07:00), Max: 37.7 (25 Sep 2019 23:00)  T(F): 98.3 (26 Sep 2019 07:00), Max: 99.8 (25 Sep 2019 23:00)  HR: 96 (26 Sep 2019 12:38) (73 - 96)  BP: 133/95 (26 Sep 2019 12:38) (127/76 - 142/74)  BP(mean): 92 (26 Sep 2019 11:00) (92 - 99)  RR: 22 (26 Sep 2019 11:00) (14 - 22)  SpO2: 100% (26 Sep 2019 03:00) (98% - 100%)      I&O's Summary  25 Sep 2019 07:  -  26 Sep 2019 07:00  --------------------------------------------------------  IN: 4475 mL / OUT: 4400 mL / NET: 75 mL    26 Sep 2019 07:01  -  26 Sep 2019 15:27  --------------------------------------------------------  IN: 125 mL / OUT: 1600 mL / NET: -1475 mL        PHYSICAL EXAM:  GENERAL: NAD  HEENT:  s/p meningioma resection- dressing covering the head/ surgical area. MMM, neck supple, no JVD  EYES: EOMI, PERRLA, conjunctiva and sclera clear  CHEST/LUNG: Clear to auscultation bilaterally; No wheeze, rhonchi or rales.  HEART: S1, S2, rrr; No murmurs, rubs, or gallops  ABDOMEN: Soft, Nontender, Nondistended; Bowel sounds present  EXTREMITIES:  No clubbing, cyanosis, or edema  PSYCH: calm, AOx3, very slow to answer questions  SKIN: No rashes or lesions      LABS:             10.7   13.7  )-----------( 206      ( 25 Sep 2019 21:52 )             32.9         145  |  114<H>  |  10  ----------------------------<  124<H>  4.1   |  21<L>  |  0.87    Ca    8.0<L>      25 Sep 2019 21:52  Phos  1.8       Mg     2.1           RADIOLOGY & ADDITIONAL TESTS:    Imaging Personally Reviewed:   CT Head : Status post resection of midline frontal meningioma with expected postoperative changes.  Duplex LE bilateral : No evidence of deep venous thrombosis in either lower extremity.    Consultant(s) Notes Reviewed:  Neurosurgery, Psychiatry    Care Discussed with Consultants/Other Providers: Neurosurgery re plan of care

## 2019-09-26 NOTE — PROVIDER CONTACT NOTE (OTHER) - ACTION/TREATMENT ORDERED:
No interventions at this time, continue to monitor. Removed 1150cc of urine via straight cath, 1400cc urine output total at 05:30.

## 2019-09-26 NOTE — BEHAVIORAL HEALTH ASSESSMENT NOTE - NSBHCHARTREVIEWLAB_PSY_A_CORE FT
10.7   13.7  )-----------( 206      ( 25 Sep 2019 21:52 )             32.9     09-25    145  |  114<H>  |  10  ----------------------------<  124<H>  4.1   |  21<L>  |  0.87    Ca    8.0<L>      25 Sep 2019 21:52  Phos  1.8     09-25  Mg     2.1     09-25

## 2019-09-26 NOTE — BEHAVIORAL HEALTH ASSESSMENT NOTE - NSBHCONSULTMEDS_PSY_A_CORE FT
cont abilify 2mg po daily, wellbutrin xl 300mg daily. can decrease klonopin to 0.5mg po tid given recent confusion, delirium. haldol 1mg po/iv q6hr prn agitation. check ekg . hold off doxepin for now.

## 2019-09-26 NOTE — PHYSICAL THERAPY INITIAL EVALUATION ADULT - DIAGNOSIS, PT EVAL
Pt p/w impaired strength, balance, motor control, cognition, and coordination resulting in mobility deficits.

## 2019-09-26 NOTE — BEHAVIORAL HEALTH ASSESSMENT NOTE - ADDITIONAL DETAILS / COMMENTS
short term memory impaired- 1 of 3 word correct on recall  attention impaired- 1 letter correct when spelling "THINK" backwards

## 2019-09-26 NOTE — CONSULT NOTE ADULT - ASSESSMENT
65 yo female with PMH of HLD, thyroid nodule s/p thyroidectomy, OA, spinal fusion in 2003, brain hemangioma s/p resection in 2003 presented for resection of a large anterior parasagittal meningioma extending through the superior sagittal sinus and calvarium into the subcutaneous soft tissue. S/p selective cerebral angiography and embolization on 9/23 and craniotomy for meningioma resection on 9/24.

## 2019-09-26 NOTE — PROGRESS NOTE ADULT - SUBJECTIVE AND OBJECTIVE BOX
Macey Fajardo    65 yo F HD #2, POD #2 s/p meningioma resection and embolization.   No acute events overnight.  Patient seen and examined at bedside today. Doing well. No pain.     --Anticoagulation--  enoxaparin Injectable 40 milliGRAM(s) SubCutaneous <User Schedule>    T(C): 36.6 (09-26-19 @ 15:31), Max: 37.7 (09-25-19 @ 23:00)  HR: 95 (09-26-19 @ 15:45) (73 - 96)  BP: 128/83 (09-26-19 @ 15:45) (127/76 - 142/74)  RR: 18 (09-26-19 @ 15:31) (14 - 33)  SpO2: 94% (09-26-19 @ 15:45) (94% - 100%)  Wt(kg): --    Exam:  General: NAD, difficult to examine because patient didn't want to speak much, but following commands  AAOx3  Eyes: PERRLA, EOMI  Motor: 5/5 in all extremities  Sensation intact    Assessment:  65 yo F HD #2, POD #2 s/p meningioma resection and embolization. Macey Moraleswilfredomarilee    63 yo F HD #2, POD #2 s/p meningioma resection and selective embolization.   No acute events overnight.  Patient seen and examined at bedside today. Doing well. No pain. Incision site c/d/i. WBC trending down from 21.3 to 13.7.    --Anticoagulation--  enoxaparin Injectable 40 milliGRAM(s) SubCutaneous     T(C): 36.6 (09-26-19 @ 15:31), Max: 37.7 (09-25-19 @ 23:00)  HR: 95 (09-26-19 @ 15:45) (73 - 96)  BP: 128/83 (09-26-19 @ 15:45) (127/76 - 142/74)  RR: 18 (09-26-19 @ 15:31) (14 - 33)  SpO2: 94% (09-26-19 @ 15:45) (94% - 100%)  Wt(kg): --    Exam:  Vitals WNL  General: NAD, difficult to examine because patient didn't want to speak much, but following commands  AAOx3  Eyes: PERRLA, EOMI  Motor: 5/5 in all extremities  Sensation intact

## 2019-09-26 NOTE — PROGRESS NOTE ADULT - ASSESSMENT
Assessment:  63 yo F HD #2, POD #2 s/p meningioma resection and selective embolization. Recovering well. Continue decadron taper and psychiatric medications.

## 2019-09-26 NOTE — PHYSICAL THERAPY INITIAL EVALUATION ADULT - ADDITIONAL COMMENTS
8/13/19 MR HEAD:  Images obtained using the [employer] protocol. Large anterior parasagittal meningioma measuring 4.0 x 2.8 x 2.4 cm in diameter extending through the superior sagittal sinus and calvarium into the subcutaneous soft tissues. The superior sagittal sinus is patent proximal and distal to the mass.

## 2019-09-26 NOTE — BEHAVIORAL HEALTH ASSESSMENT NOTE - NSBHCHARTREVIEWVS_PSY_A_CORE FT
Vital Signs Last 24 Hrs  T(C): 36.8 (26 Sep 2019 07:00), Max: 37.7 (25 Sep 2019 23:00)  T(F): 98.3 (26 Sep 2019 07:00), Max: 99.8 (25 Sep 2019 23:00)  HR: 79 (26 Sep 2019 08:00) (73 - 93)  BP: 136/82 (26 Sep 2019 08:00) (127/76 - 142/74)  BP(mean): 99 (26 Sep 2019 08:00) (93 - 99)  RR: 18 (26 Sep 2019 08:00) (14 - 32)  SpO2: 100% (26 Sep 2019 03:00) (98% - 100%)

## 2019-09-26 NOTE — BEHAVIORAL HEALTH ASSESSMENT NOTE - SUMMARY
65yo  female, , domiciled with , has 5 adult children (45,42,39,38,28), employed as a , currently in psych tx with dr. hu since 2003, +PPHx of depression and suicide attempt (overdose on benzo. 2003), no hx of violence, substance abuse, + PMhx for HLD, thyroid nodule, osteoarthritis s/p spinal fusion in 2003 and gel injections to both knees and brain hemangioma s/p resection in 2003. pt admitted for selective cerebral angiography and embolization on 9/23. Psych CL for pt c/o depression, flat affect, SI. pt reports she has been feeling depressed and reports suicidal ideations, denies suicidal intent and plan. pt denies HI/AVH, liz, psychosis, denies smoking, etoh and illicit drug use. Pt noted to have cognitive deficits, impaired attention, difficulty finding words, most likely due to delirium, recent brain surgery.

## 2019-09-26 NOTE — BEHAVIORAL HEALTH ASSESSMENT NOTE - NSBHCONSULTRECOMMENDOTHER_PSY_A_CORE FT
as recommended by Dr. Mcclellan continue to consult with neuro regarding typical recovery symptoms from cerebral angiography with pipeline stent and midline craniotomy for tumor resection

## 2019-09-26 NOTE — PHYSICAL THERAPY INITIAL EVALUATION ADULT - IMPAIRMENTS CONTRIBUTING TO GAIT DEVIATIONS, PT EVAL
impaired coordination/decreased ROM/decreased strength/impaired postural control/impaired balance/cognition/impaired motor control

## 2019-09-26 NOTE — PROVIDER CONTACT NOTE (OTHER) - SITUATION
At 05:20, pt felt the need to void, able to urinate 250cc, however pt still had to urinate but unable to. Bladder scan revealed 900cc remaining in bladder. Last straight cath at 03:00, 900cc removed

## 2019-09-26 NOTE — CONSULT NOTE ADULT - PROBLEM SELECTOR RECOMMENDATION 9
Suspect hypoactive delirium - multifactorial etiology - brain surgery, anesthesia, pain medications, psych medications, steroids (decadron).   Psychiatry consulted.  Recommended to d/c oxycodone and to decrease Klonopin to BID dosing.   Try to taper decadron to off as soon as possible.   Supportive care

## 2019-09-26 NOTE — CONSULT NOTE ADULT - SUBJECTIVE AND OBJECTIVE BOX
63 yo LHD female with PMH of HLD, thyroid nodule s/p thyroidectomy, OA, spinal fusion in 2003, brain hemangioma s/p resection in 2003 presented with complaints of swelling on top of her head. MRI of the brain showed a large anterior parasagittal meningioma extending through the superior sagittal sinus and calvarium into the subcutaneous soft tissue. Patient is s/p selective cerebral angiography and embolization on 9/23 and craniotomy for meningioma resection on 9/24. Patient noted to have some hallucinations on 9/25.     REVIEW OF SYSTEMS  Constitutional - No fever, No weight loss, + fatigue  HEENT - No eye pain, No visual disturbances, No difficulty hearing, No tinnitus,   Respiratory - No cough, No wheezing, No shortness of breath  Cardiovascular - No chest pain, No palpitations  Gastrointestinal - No abdominal pain, No nausea, No vomiting, No diarrhea, No constipation  Genitourinary - No dysuria, No frequency, No incontinence  Neurological - No headaches, No memory loss, +weakness, No numbness, No tremors  Skin - No itching, No rashes,  Musculoskeletal - No joint pain, No joint swelling, No muscle pain  Psychiatric - + depression, + anxiety    PAST MEDICAL & SURGICAL HISTORY  HLD (hyperlipidemia)  Thyroid nodule  OA (osteoarthritis)  Depression with anxiety  S/P resection of meningioma  S/P thyroidectomy  S/P lumbar spinal fusion      SOCIAL HISTORY  Smoking - Denied  EtOH - Denied   Drugs - Denied    PRIOR FUNCTIONAL HISTORY  Ambulation: independent  ADLs: independent    Previous Home Equipment: denies    HOME ENVIRONMENT:  Type of Home: 2 floor private house (bedroom and bathroom)  Stairs: 2 outside / 2 flights inside  Living With: spouse and son  Caregiver's availability: yes    Special Needs or Precautions:  Weight bearing status:     FAMILY HISTORY   FH: lung cancer  FH: premature coronary heart disease      RECENT LABS/IMAGING  CBC Full  -  ( 25 Sep 2019 21:52 )  WBC Count : 13.7 K/uL  RBC Count : 3.65 M/uL  Hemoglobin : 10.7 g/dL  Hematocrit : 32.9 %  Platelet Count - Automated : 206 K/uL  Mean Cell Volume : 90.0 fl  Mean Cell Hemoglobin : 29.5 pg  Mean Cell Hemoglobin Concentration : 32.7 gm/dL  Auto Neutrophil # : x  Auto Lymphocyte # : x  Auto Monocyte # : x  Auto Eosinophil # : x  Auto Basophil # : x  Auto Neutrophil % : x  Auto Lymphocyte % : x  Auto Monocyte % : x  Auto Eosinophil % : x  Auto Basophil % : x    09-25    145  |  114<H>  |  10  ----------------------------<  124<H>  4.1   |  21<L>  |  0.87    Ca    8.0<L>      25 Sep 2019 21:52  Phos  1.8     09-25  Mg     2.1     09-25      IMAGING  < from: MR Brain Functional- MD/Phd (08.13.19 @ 18:09) >  Images obtained using the [employer] protocol. Large anterior parasagittal meningioma measuring 4.0 x 2.8 x 2.4 cm in diameter extending through the superior sagittal sinus and calvarium into the subcutaneous soft tissues. The superior sagittal sinus is patent proximal and distal to the mass.    < from: [facility] Duplex Lower Ext Vein Scan, Bilat (09.24.19 @ 17:15) >  No evidence of deep venous thrombosis in either lower extremity.    < from: CT Head No Cont (09.25.19 @ 08:11) >  Status post resection of midline frontal meningioma with expected postoperative changes.      VITALS  T(C): 36.8 (09-26-19 @ 07:00), Max: 37.7 (09-25-19 @ 23:00)  HR: 73 (09-26-19 @ 07:00) (73 - 93)  BP: 136/82 (09-26-19 @ 07:00) (127/76 - 142/74)  RR: 18 (09-26-19 @ 07:00) (14 - 32)  SpO2: 100% (09-26-19 @ 03:00) (97% - 100%)  Wt(kg): --    ALLERGIES  penicillin (Hives)      MEDICATIONS   acetaminophen   Tablet .. 650 milliGRAM(s) Oral every 6 hours PRN  ARIPiprazole 2 milliGRAM(s) Oral every 24 hours  atorvastatin 40 milliGRAM(s) Oral at bedtime  buPROPion XL . 300 milliGRAM(s) Oral every 24 hours  chlorhexidine 4% Liquid 1 Application(s) Topical <User Schedule>  clonazePAM  Tablet 1 milliGRAM(s) Oral three times a day  dexamethasone     Tablet   Oral   dexamethasone     Tablet 3 milliGRAM(s) Oral every 8 hours  diVALproex  milliGRAM(s) Oral every 8 hours  doxazosin 1 milliGRAM(s) Oral at bedtime  enoxaparin Injectable 40 milliGRAM(s) SubCutaneous <User Schedule>  folic acid 1 milliGRAM(s) Oral daily  influenza   Vaccine 0.5 milliLiter(s) IntraMuscular once  levothyroxine 100 MICROGram(s) Oral daily  oxyCODONE    IR 5 milliGRAM(s) Oral every 4 hours PRN  pantoprazole    Tablet 40 milliGRAM(s) Oral before breakfast      ----------------------------------------------------------------------------------------  PHYSICAL EXAM  Constitutional - NAD, Comfortable, Sitting up in bed  HEENT - NCAT, EOMI  Neck - Supple, No limited ROM  Chest - CTA bilaterally, No wheezing  Cardiovascular - RRR, S1S2,  Abdomen - BS+, Soft, NT ND  Extremities - No C/C/E, No calf tenderness   Neurologic Exam -                    Cognitive - AAOx3 (pt able to state her name, place, date)     Communication - Fluent, No dysarthria     Cranial Nerves - no facial droop, tongue midline, no ptosis, facial sensation intact, shoulder shrug intact     Motor - No focal deficits                    LEFT    UE - ShAB 5/5, EF 5/5, EE 5/5,  5/5                    RIGHT UE - ShAB 5/5, EF 5/5, EE 5/5,  5/5                    LEFT    LE - HF 3+/5, DF 4/5, PF 4/5                    RIGHT LE - HF 3+/5, DF 4/5, PF 4/5        Sensory - Intact to LT     Reflexes - DTR Intact,      Coordination - FTN fair bilaterally     No pronator drift     OculoVestibular - No saccades, No nystagmus,     Psychiatric - Flat affect, appears depressed    CURRENT FUNCTIONAL STATUS: Pending therapy evaluation   ----------------------------------------------------------------------------------------  ASSESSMENT/PLAN    63 yo Female with functional deficits after large anterior parasagittal meningioma s/p craniotomy    Meningioma s/p craniotomy - management as per neurosurgical team, Decadron taper  Seizure ppx - Depakote   Urinary retention - Cardura  Hypothyroid - Synthroid  Depression/Anxiety - Klonopin, Wellbutrin, Abilify  Pain - Tylenol, Oxy IR  DVT PPX - SCDs, Lovenox  Rehab -   Continue bedside therapy as well as OOB throughout the day with mobilization by staff to maintain cardiopulmonary function and prevention of secondary complications related to debility.   Will follow up for dispo recommendations.   Will continue to follow for ongoing rehab needs and recommendations. . Functional progress will determine ongoing rehab dispo recommendations, which may change. 65 yo LHD female with PMH of HLD, thyroid nodule s/p thyroidectomy, OA, spinal fusion in 2003, brain hemangioma s/p resection in 2003 presented with complaints of swelling on top of her head. MRI of the brain showed a large anterior parasagittal meningioma extending through the superior sagittal sinus and calvarium into the subcutaneous soft tissue. Patient is s/p selective cerebral angiography and embolization on 9/23 and craniotomy for meningioma resection on 9/24. Patient noted to have some hallucinations on 9/25.     REVIEW OF SYSTEMS  Constitutional - No fever, No weight loss, + fatigue  HEENT - No eye pain, No visual disturbances, No difficulty hearing, No tinnitus,   Respiratory - No cough, No wheezing, No shortness of breath  Cardiovascular - No chest pain, No palpitations  Gastrointestinal - No abdominal pain, No nausea, No vomiting, No diarrhea, No constipation  Genitourinary - No dysuria, No frequency, No incontinence  Neurological - No headaches, No memory loss, +weakness, No numbness, No tremors  Skin - No itching, No rashes,  Musculoskeletal - No joint pain, No joint swelling, No muscle pain  Psychiatric - + depression, + anxiety    PAST MEDICAL & SURGICAL HISTORY  HLD (hyperlipidemia)  Thyroid nodule  OA (osteoarthritis)  Depression with anxiety  S/P resection of meningioma  S/P thyroidectomy  S/P lumbar spinal fusion      SOCIAL HISTORY  Smoking - Denied  EtOH - Denied   Drugs - Denied    PRIOR FUNCTIONAL HISTORY  Ambulation: independent  ADLs: independent    Previous Home Equipment: denies    HOME ENVIRONMENT:  Type of Home: 2 floor private house (bedroom and bathroom)  Stairs: 2 outside / 2 flights inside  Living With: spouse and son  Caregiver's availability: yes    Special Needs or Precautions:  Weight bearing status:     FAMILY HISTORY   FH: lung cancer  FH: premature coronary heart disease      RECENT LABS/IMAGING  CBC Full  -  ( 25 Sep 2019 21:52 )  WBC Count : 13.7 K/uL  RBC Count : 3.65 M/uL  Hemoglobin : 10.7 g/dL  Hematocrit : 32.9 %  Platelet Count - Automated : 206 K/uL  Mean Cell Volume : 90.0 fl  Mean Cell Hemoglobin : 29.5 pg  Mean Cell Hemoglobin Concentration : 32.7 gm/dL  Auto Neutrophil # : x  Auto Lymphocyte # : x  Auto Monocyte # : x  Auto Eosinophil # : x  Auto Basophil # : x  Auto Neutrophil % : x  Auto Lymphocyte % : x  Auto Monocyte % : x  Auto Eosinophil % : x  Auto Basophil % : x    09-25    145  |  114<H>  |  10  ----------------------------<  124<H>  4.1   |  21<L>  |  0.87    Ca    8.0<L>      25 Sep 2019 21:52  Phos  1.8     09-25  Mg     2.1     09-25      IMAGING  < from: MR Brain Functional- MD/Phd (08.13.19 @ 18:09) >  Images obtained using the [employer] protocol. Large anterior parasagittal meningioma measuring 4.0 x 2.8 x 2.4 cm in diameter extending through the superior sagittal sinus and calvarium into the subcutaneous soft tissues. The superior sagittal sinus is patent proximal and distal to the mass.    < from: [facility] Duplex Lower Ext Vein Scan, Bilat (09.24.19 @ 17:15) >  No evidence of deep venous thrombosis in either lower extremity.    < from: CT Head No Cont (09.25.19 @ 08:11) >  Status post resection of midline frontal meningioma with expected postoperative changes.      VITALS  T(C): 36.8 (09-26-19 @ 07:00), Max: 37.7 (09-25-19 @ 23:00)  HR: 73 (09-26-19 @ 07:00) (73 - 93)  BP: 136/82 (09-26-19 @ 07:00) (127/76 - 142/74)  RR: 18 (09-26-19 @ 07:00) (14 - 32)  SpO2: 100% (09-26-19 @ 03:00) (97% - 100%)  Wt(kg): --    ALLERGIES  penicillin (Hives)      MEDICATIONS   acetaminophen   Tablet .. 650 milliGRAM(s) Oral every 6 hours PRN  ARIPiprazole 2 milliGRAM(s) Oral every 24 hours  atorvastatin 40 milliGRAM(s) Oral at bedtime  buPROPion XL . 300 milliGRAM(s) Oral every 24 hours  chlorhexidine 4% Liquid 1 Application(s) Topical <User Schedule>  clonazePAM  Tablet 1 milliGRAM(s) Oral three times a day  dexamethasone     Tablet   Oral   dexamethasone     Tablet 3 milliGRAM(s) Oral every 8 hours  diVALproex  milliGRAM(s) Oral every 8 hours  doxazosin 1 milliGRAM(s) Oral at bedtime  enoxaparin Injectable 40 milliGRAM(s) SubCutaneous <User Schedule>  folic acid 1 milliGRAM(s) Oral daily  influenza   Vaccine 0.5 milliLiter(s) IntraMuscular once  levothyroxine 100 MICROGram(s) Oral daily  oxyCODONE    IR 5 milliGRAM(s) Oral every 4 hours PRN  pantoprazole    Tablet 40 milliGRAM(s) Oral before breakfast      ----------------------------------------------------------------------------------------  PHYSICAL EXAM  Constitutional - NAD, Comfortable, Sitting up in bed  HEENT - NCAT, EOMI  Neck - Supple, No limited ROM  Chest - CTA bilaterally, No wheezing  Cardiovascular - RRR, S1S2,  Abdomen - BS+, Soft, NT ND  Extremities - No C/C/E, No calf tenderness   Neurologic Exam -                    Cognitive - AAOx3 (pt able to state her name, place, date)     Communication - Fluent, No dysarthria     Cranial Nerves - no facial droop, tongue midline, no ptosis, facial sensation intact, shoulder shrug intact     Motor - No focal deficits                    LEFT    UE - ShAB 5/5, EF 5/5, EE 5/5,  5/5                    RIGHT UE - ShAB 5/5, EF 5/5, EE 5/5,  5/5                    LEFT    LE - HF 3+/5, DF 4/5, PF 4/5                    RIGHT LE - HF 3+/5, DF 4/5, PF 4/5        Sensory - Intact to LT     Reflexes - DTR Intact,      Coordination - FTN fair bilaterally     No pronator drift     OculoVestibular - No saccades, No nystagmus,     Psychiatric - Flat affect, appears depressed    CURRENT FUNCTIONAL STATUS: Pending therapy evaluation   ----------------------------------------------------------------------------------------  ASSESSMENT/PLAN    65 yo Female with functional deficits after large anterior parasagittal meningioma s/p craniotomy    Meningioma s/p craniotomy - management as per neurosurgical team, Decadron taper  Seizure ppx - Depakote   Urinary retention - Cardura  Hypothyroid - Synthroid  Depression/Anxiety - Klonopin, Wellbutrin, Abilify  Pain - Tylenol, Oxy IR  DVT PPX - SCDs, Lovenox  Rehab -   Continue bedside therapy as well as OOB throughout the day with mobilization by staff to maintain cardiopulmonary function and prevention of secondary complications related to debility.   Acute Rehab- Can tolerate 3h/d PT/OT/SLP and requires daily physician visits.

## 2019-09-26 NOTE — PHYSICAL THERAPY INITIAL EVALUATION ADULT - IMPAIRED TRANSFERS: SIT/STAND, REHAB EVAL
decreased ROM/impaired balance/cognition/impaired motor control/decreased strength/impaired coordination/impaired postural control

## 2019-09-27 LAB
ANION GAP SERPL CALC-SCNC: 11 MMOL/L — SIGNIFICANT CHANGE UP (ref 5–17)
BUN SERPL-MCNC: 16 MG/DL — SIGNIFICANT CHANGE UP (ref 7–23)
CALCIUM SERPL-MCNC: 9.3 MG/DL — SIGNIFICANT CHANGE UP (ref 8.4–10.5)
CHLORIDE SERPL-SCNC: 103 MMOL/L — SIGNIFICANT CHANGE UP (ref 96–108)
CO2 SERPL-SCNC: 25 MMOL/L — SIGNIFICANT CHANGE UP (ref 22–31)
CREAT SERPL-MCNC: 0.88 MG/DL — SIGNIFICANT CHANGE UP (ref 0.5–1.3)
GLUCOSE SERPL-MCNC: 101 MG/DL — HIGH (ref 70–99)
HCT VFR BLD CALC: 36.9 % — SIGNIFICANT CHANGE UP (ref 34.5–45)
HGB BLD-MCNC: 11.8 G/DL — SIGNIFICANT CHANGE UP (ref 11.5–15.5)
MCHC RBC-ENTMCNC: 28 PG — SIGNIFICANT CHANGE UP (ref 27–34)
MCHC RBC-ENTMCNC: 32 GM/DL — SIGNIFICANT CHANGE UP (ref 32–36)
MCV RBC AUTO: 87.6 FL — SIGNIFICANT CHANGE UP (ref 80–100)
PLATELET # BLD AUTO: 232 K/UL — SIGNIFICANT CHANGE UP (ref 150–400)
POTASSIUM SERPL-MCNC: 4.2 MMOL/L — SIGNIFICANT CHANGE UP (ref 3.5–5.3)
POTASSIUM SERPL-SCNC: 4.2 MMOL/L — SIGNIFICANT CHANGE UP (ref 3.5–5.3)
RBC # BLD: 4.21 M/UL — SIGNIFICANT CHANGE UP (ref 3.8–5.2)
RBC # FLD: 12.6 % — SIGNIFICANT CHANGE UP (ref 10.3–14.5)
SODIUM SERPL-SCNC: 139 MMOL/L — SIGNIFICANT CHANGE UP (ref 135–145)
TSH SERPL-MCNC: 0.06 UIU/ML — LOW (ref 0.27–4.2)
WBC # BLD: 13.38 K/UL — HIGH (ref 3.8–10.5)
WBC # FLD AUTO: 13.38 K/UL — HIGH (ref 3.8–10.5)

## 2019-09-27 PROCEDURE — 99233 SBSQ HOSP IP/OBS HIGH 50: CPT

## 2019-09-27 PROCEDURE — 70553 MRI BRAIN STEM W/O & W/DYE: CPT | Mod: 26

## 2019-09-27 PROCEDURE — 99232 SBSQ HOSP IP/OBS MODERATE 35: CPT

## 2019-09-27 RX ORDER — SENNA PLUS 8.6 MG/1
2 TABLET ORAL AT BEDTIME
Refills: 0 | Status: DISCONTINUED | OUTPATIENT
Start: 2019-09-27 | End: 2019-10-02

## 2019-09-27 RX ORDER — LEVOTHYROXINE SODIUM 125 MCG
88 TABLET ORAL DAILY
Refills: 0 | Status: DISCONTINUED | OUTPATIENT
Start: 2019-09-27 | End: 2019-10-02

## 2019-09-27 RX ORDER — POLYETHYLENE GLYCOL 3350 17 G/17G
17 POWDER, FOR SOLUTION ORAL
Refills: 0 | Status: DISCONTINUED | OUTPATIENT
Start: 2019-09-27 | End: 2019-10-02

## 2019-09-27 RX ORDER — CLONAZEPAM 1 MG
0.5 TABLET ORAL EVERY 8 HOURS
Refills: 0 | Status: DISCONTINUED | OUTPATIENT
Start: 2019-09-27 | End: 2019-10-02

## 2019-09-27 RX ORDER — LEVETIRACETAM 250 MG/1
500 TABLET, FILM COATED ORAL
Refills: 0 | Status: COMPLETED | OUTPATIENT
Start: 2019-09-27 | End: 2019-10-01

## 2019-09-27 RX ORDER — DOCUSATE SODIUM 100 MG
100 CAPSULE ORAL THREE TIMES A DAY
Refills: 0 | Status: DISCONTINUED | OUTPATIENT
Start: 2019-09-27 | End: 2019-10-02

## 2019-09-27 RX ADMIN — Medication 2 MILLIGRAM(S): at 13:24

## 2019-09-27 RX ADMIN — BUPROPION HYDROCHLORIDE 300 MILLIGRAM(S): 150 TABLET, EXTENDED RELEASE ORAL at 05:43

## 2019-09-27 RX ADMIN — Medication 100 MILLIGRAM(S): at 21:49

## 2019-09-27 RX ADMIN — Medication 1 MILLIGRAM(S): at 11:23

## 2019-09-27 RX ADMIN — ATORVASTATIN CALCIUM 40 MILLIGRAM(S): 80 TABLET, FILM COATED ORAL at 21:49

## 2019-09-27 RX ADMIN — PANTOPRAZOLE SODIUM 40 MILLIGRAM(S): 20 TABLET, DELAYED RELEASE ORAL at 05:43

## 2019-09-27 RX ADMIN — DIVALPROEX SODIUM 500 MILLIGRAM(S): 500 TABLET, DELAYED RELEASE ORAL at 05:43

## 2019-09-27 RX ADMIN — Medication 1 MILLIGRAM(S): at 21:49

## 2019-09-27 RX ADMIN — Medication 0.5 MILLIGRAM(S): at 21:49

## 2019-09-27 RX ADMIN — POLYETHYLENE GLYCOL 3350 17 GRAM(S): 17 POWDER, FOR SOLUTION ORAL at 17:20

## 2019-09-27 RX ADMIN — SENNA PLUS 2 TABLET(S): 8.6 TABLET ORAL at 21:49

## 2019-09-27 RX ADMIN — Medication 2 MILLIGRAM(S): at 21:49

## 2019-09-27 RX ADMIN — DIVALPROEX SODIUM 500 MILLIGRAM(S): 500 TABLET, DELAYED RELEASE ORAL at 13:25

## 2019-09-27 RX ADMIN — Medication 3 MILLIGRAM(S): at 05:43

## 2019-09-27 RX ADMIN — ENOXAPARIN SODIUM 40 MILLIGRAM(S): 100 INJECTION SUBCUTANEOUS at 17:18

## 2019-09-27 RX ADMIN — Medication 100 MICROGRAM(S): at 05:43

## 2019-09-27 RX ADMIN — LEVETIRACETAM 500 MILLIGRAM(S): 250 TABLET, FILM COATED ORAL at 17:17

## 2019-09-27 RX ADMIN — ARIPIPRAZOLE 2 MILLIGRAM(S): 15 TABLET ORAL at 05:43

## 2019-09-27 NOTE — PROGRESS NOTE ADULT - SUBJECTIVE AND OBJECTIVE BOX
Ubaldo Hansen MD, MBA ([facility])  Pager [phone]  (During off hours please page [phone])      Patient is a 64y old  Female who presents with a chief complaint of Meningioma Resection (26 Sep 2019 16:48)      SUBJECTIVE / OVERNIGHT EVENTS:  No events overnight. The patient continues to be confused, no complaints.     MEDICATIONS  (STANDING):  ARIPiprazole 2 milliGRAM(s) Oral every 24 hours  atorvastatin 40 milliGRAM(s) Oral at bedtime  buPROPion XL . 300 milliGRAM(s) Oral every 24 hours  clonazePAM  Tablet 0.5 milliGRAM(s) Oral every 8 hours  dexamethasone     Tablet 2 milliGRAM(s) Oral every 8 hours  doxazosin 1 milliGRAM(s) Oral at bedtime  enoxaparin Injectable 40 milliGRAM(s) SubCutaneous <User Schedule>  folic acid 1 milliGRAM(s) Oral daily  influenza   Vaccine 0.5 milliLiter(s) IntraMuscular once  levETIRAcetam 500 milliGRAM(s) Oral two times a day  levothyroxine 100 MICROGram(s) Oral daily  pantoprazole    Tablet 40 milliGRAM(s) Oral before breakfast  polyethylene glycol 3350 17 Gram(s) Oral two times a day    MEDICATIONS  (PRN):  acetaminophen   Tablet .. 650 milliGRAM(s) Oral every 6 hours PRN Mild Pain (1 - 3)      Vital Signs Last 24 Hrs  T(C): 36.8 (27 Sep 2019 16:00), Max: 37.3 (27 Sep 2019 04:49)  T(F): 98.2 (27 Sep 2019 16:00), Max: 99.1 (27 Sep 2019 04:49)  HR: 113 (27 Sep 2019 16:00) (91 - 114)  BP: 129/84 (27 Sep 2019 16:00) (127/90 - 141/91)  BP(mean): --  RR: 18 (27 Sep 2019 16:00) (17 - 18)  SpO2: 94% (27 Sep 2019 16:00) (93% - 95%)  CAPILLARY BLOOD GLUCOSE        I&O's Summary    26 Sep 2019 07:01  -  27 Sep 2019 07:00  --------------------------------------------------------  IN: 245 mL / OUT: 1600 mL / NET: -1355 mL    27 Sep 2019 07:01  -  27 Sep 2019 16:29  --------------------------------------------------------  IN: 240 mL / OUT: 0 mL / NET: 240 mL        PHYSICAL EXAM:  GENERAL: NAD  HEENT:  s/p meningioma resection- dressing covering the head/ surgical area. MMM, neck supple, no JVD  EYES: EOMI, conjunctiva and sclera clear  CHEST/LUNG: Clear to auscultation bilaterally; No wheeze, rhonchi or rales.  HEART: S1, S2, rrr; No murmurs, rubs, or gallops  ABDOMEN: Soft, Nontender, Nondistended; Bowel sounds present  EXTREMITIES:  No clubbing, cyanosis, or edema  PSYCH: calm, AOx3, very slow to answer questions  SKIN: No rashes or lesions      LABS:                        11.8   13.38 )-----------( 232      ( 27 Sep 2019 09:24 )             36.9     09-27    139  |  103  |  16  ----------------------------<  101<H>  4.2   |  25  |  0.88    Ca    9.3      27 Sep 2019 06:44  Phos  1.8     09-25  Mg     2.1     09-25      RADIOLOGY & ADDITIONAL TESTS:    Consultant(s) Notes Reviewed: Neurosurgery, Psych    Care Discussed with Consultants/Other Providers: Neurosurgery- re medications

## 2019-09-27 NOTE — PROGRESS NOTE ADULT - SUBJECTIVE AND OBJECTIVE BOX
SUBJECTIVE:  Patient seen and examined.  Flat affect.  avoids eye contact with me but answers my questions slowly.      Vital Signs Last 24 Hrs  T(C): 37.2 (27 Sep 2019 08:42), Max: 37.3 (27 Sep 2019 04:49)  T(F): 99 (27 Sep 2019 08:42), Max: 99.1 (27 Sep 2019 04:49)  HR: 92 (27 Sep 2019 08:42) (87 - 114)  BP: 127/90 (27 Sep 2019 08:42) (127/90 - 141/91)  BP(mean): 92 (26 Sep 2019 11:00) (92 - 92)  RR: 17 (27 Sep 2019 08:42) (17 - 33)  SpO2: 93% (27 Sep 2019 08:42) (93% - 94%)    PHYSICAL EXAM:    Constitutional: No Acute Distress     Neurological: AOx3, Following Commands, Moving all Extremities     Motor exam:          Upper extremity                         Delt     Bicep     Tricep    HG                                                 R         5/5        5/5        5/5       5/5                                               L          5/5        5/5        5/5       5/5          Lower extremity                        HF         KF        KE       DF         PF                                                  R        5/5        5/5        5/5       5/5         5/5                                               L         5/5        5/5       5/5       5/5          5/5                                                 Sensation: [x] intact to light touch  [] decreased:     Pulmonary: Clear to Auscultation, No rales, No rhonchi, No wheezes     Cardiovascular: S1, S2, Regular rate and rhythm     Gastrointestinal: Soft, Non-tender, Non-distended     Extremities: No calf tenderness     Incision: c/d/i + sutures   LABS:                        11.8   13.38 )-----------( 232      ( 27 Sep 2019 09:24 )             36.9    09-27    139  |  103  |  16  ----------------------------<  101<H>  4.2   |  25  |  0.88    Ca    9.3      27 Sep 2019 06:44  Phos  1.8     09-25  Mg     2.1     09-25    Hemoglobin A1C, Whole Blood: 5.5 % (09-23-19 @ 15:38)      09-26 @ 07:01  -  09-27 @ 07:00  --------------------------------------------------------  IN: 245 mL / OUT: 1600 mL / NET: -1355 mL        MEDICATIONS:  Anticoagulation:   enoxaparin Injectable 40 milliGRAM(s) SubCutaneous <User Schedule>    Antibiotics:    Endo:  atorvastatin 40 milliGRAM(s) Oral at bedtime  dexamethasone     Tablet   Oral   dexamethasone     Tablet 2 milliGRAM(s) Oral every 8 hours  levothyroxine 100 MICROGram(s) Oral daily    Neuro:  acetaminophen   Tablet .. 650 milliGRAM(s) Oral every 6 hours PRN Mild Pain (1 - 3)  ARIPiprazole 2 milliGRAM(s) Oral every 24 hours  buPROPion XL . 300 milliGRAM(s) Oral every 24 hours  clonazePAM  Tablet 1 milliGRAM(s) Oral two times a day  diVALproex  milliGRAM(s) Oral every 8 hours    Cardiac:  doxazosin 1 milliGRAM(s) Oral at bedtime    Pulm:    GI/:  pantoprazole    Tablet 40 milliGRAM(s) Oral before breakfast    Other:   folic acid 1 milliGRAM(s) Oral daily  influenza   Vaccine 0.5 milliLiter(s) IntraMuscular once    DIET: regular     IMAGING:

## 2019-09-27 NOTE — PROGRESS NOTE ADULT - ASSESSMENT
s/p craniotomy for frontal tumor meningioma    PROCEDURE: 9/24/  PAST MEDICAL & SURGICAL HISTORY:  HLD (hyperlipidemia)  Thyroid nodule: 1999  OA (osteoarthritis)  Depression with anxiety  S/P resection of meningioma: 2003  S/P thyroidectomy: 1999 for nodule  S/P lumbar spinal fusion: 2003        PLAN:  Neuro: neuro checks q 4, vitals q 4   tylenol for pain  history of suicidal ideations:  denies it now.   on 1 to 1, with psych clearing for discharge  continue abilify   continue wellbutrin, depakote and clonopin  mri w/wo of the brain pending  contrinue decadron raman for cerebral edema   Respiratory: incentive spirometry   CV:  lipitor for hyperlipidemia   Endocrine:  synthyroid for hypothyroidism,   Heme/Onc: stable            DVT ppx: lovenox and scds  Renal: ivl voiding   ID: afebrile  GI:  tolerating diet, no bm yet add miralax   PT/OT: acute tbi rehab   Will discuss with Dr. Reyes       Assessment:  Please Check When Present   [] Acute blood loss anemia  []Cerebral edema, [] Brain compression/ herniation,     []  GCS  E   V  M     Heart Failure: []Acute, [] acute on chronic , []chronic  Heart Failure:  [] Diastolic (HFpEF), [] Systolic (HFrEF), []Combined (HFpEF and HFrEF), [] RHF, [] Pulm HTN, [] Other    [] DOMINIC, [] ATN, [] AIN, [] other  [] CKD1, [] CKD2, [] CKD 3, [] CKD 4, [] CKD 5, []ESRD    Encephalopathy: [] Metabolic, [] Hepatic, [] toxic, [] Neurological, [] Other    Abnormal Nurtitional Status: [] malnurtition (see nutrition note), [ ]underweight: BMI < 19, [] morbid obesity: BMI >40, [] Cachexia  [] Functional quadriplegia  [] Sepsis  [] hypovolemic shock,[] cardiogenic shock, [] hemorrhagic shock, [] neuogenic shock  [] Acute Respiratory Failure          [facility] # [id number]

## 2019-09-27 NOTE — PROGRESS NOTE ADULT - ASSESSMENT
63 yo female with PMH of HLD, thyroid nodule s/p thyroidectomy, OA, spinal fusion in 2003, brain hemangioma s/p resection in 2003 presented for resection of a large anterior parasagittal meningioma extending through the superior sagittal sinus and calvarium into the subcutaneous soft tissue. S/p selective cerebral angiography and embolization on 9/23 and craniotomy for meningioma resection on 9/24.

## 2019-09-27 NOTE — PROGRESS NOTE BEHAVIORAL HEALTH - NSBHCHARTREVIEWLAB_PSY_A_CORE FT
11.8   13.38 )-----------( 232      ( 27 Sep 2019 09:24 )             36.9     09-27    139  |  103  |  16  ----------------------------<  101<H>  4.2   |  25  |  0.88    Ca    9.3      27 Sep 2019 06:44  Phos  1.8     09-25  Mg     2.1     09-25      Phos  1.8     09-25  Mg     2.1     09-25

## 2019-09-28 PROCEDURE — 71045 X-RAY EXAM CHEST 1 VIEW: CPT | Mod: 26

## 2019-09-28 PROCEDURE — 99233 SBSQ HOSP IP/OBS HIGH 50: CPT

## 2019-09-28 RX ADMIN — Medication 1 MILLIGRAM(S): at 21:17

## 2019-09-28 RX ADMIN — Medication 2 MILLIGRAM(S): at 05:24

## 2019-09-28 RX ADMIN — BUPROPION HYDROCHLORIDE 300 MILLIGRAM(S): 150 TABLET, EXTENDED RELEASE ORAL at 05:24

## 2019-09-28 RX ADMIN — Medication 100 MILLIGRAM(S): at 21:18

## 2019-09-28 RX ADMIN — Medication 100 MILLIGRAM(S): at 13:41

## 2019-09-28 RX ADMIN — Medication 0.5 MILLIGRAM(S): at 05:23

## 2019-09-28 RX ADMIN — SENNA PLUS 2 TABLET(S): 8.6 TABLET ORAL at 21:18

## 2019-09-28 RX ADMIN — POLYETHYLENE GLYCOL 3350 17 GRAM(S): 17 POWDER, FOR SOLUTION ORAL at 17:29

## 2019-09-28 RX ADMIN — ARIPIPRAZOLE 2 MILLIGRAM(S): 15 TABLET ORAL at 05:23

## 2019-09-28 RX ADMIN — Medication 0.5 MILLIGRAM(S): at 15:52

## 2019-09-28 RX ADMIN — Medication 1 MILLIGRAM(S): at 13:41

## 2019-09-28 RX ADMIN — Medication 0.5 MILLIGRAM(S): at 21:17

## 2019-09-28 RX ADMIN — Medication 100 MILLIGRAM(S): at 05:24

## 2019-09-28 RX ADMIN — POLYETHYLENE GLYCOL 3350 17 GRAM(S): 17 POWDER, FOR SOLUTION ORAL at 05:24

## 2019-09-28 RX ADMIN — Medication 2 MILLIGRAM(S): at 17:28

## 2019-09-28 RX ADMIN — Medication 88 MICROGRAM(S): at 05:23

## 2019-09-28 RX ADMIN — ENOXAPARIN SODIUM 40 MILLIGRAM(S): 100 INJECTION SUBCUTANEOUS at 17:29

## 2019-09-28 RX ADMIN — LEVETIRACETAM 500 MILLIGRAM(S): 250 TABLET, FILM COATED ORAL at 17:28

## 2019-09-28 RX ADMIN — LEVETIRACETAM 500 MILLIGRAM(S): 250 TABLET, FILM COATED ORAL at 05:24

## 2019-09-28 RX ADMIN — ATORVASTATIN CALCIUM 40 MILLIGRAM(S): 80 TABLET, FILM COATED ORAL at 21:18

## 2019-09-28 NOTE — PROGRESS NOTE ADULT - SUBJECTIVE AND OBJECTIVE BOX
Anastacia Gomez MD ([facility] Hospitalist)  Pager: [phone]  (During off hours please page [phone])      Patient is a 64y old  Female who presents with a chief complaint of brain lesion (28 Sep 2019 07:52)      SUBJECTIVE / OVERNIGHT EVENTS: No acute events. Patient slow to speak but answering questions appropriately. Denies fevers, chills, headache, cough, SOB, chest pain, abd pain, n/v/d/c nor dysuria. Endorses feeling tired. Denies any SI at this time but does have HI, stating she wants to "hurt and get ride of my son because he doesn't deserve to live". has 1:1 at bedside.    MEDICATIONS  (STANDING):  ARIPiprazole 2 milliGRAM(s) Oral every 24 hours  atorvastatin 40 milliGRAM(s) Oral at bedtime  buPROPion XL . 300 milliGRAM(s) Oral every 24 hours  clonazePAM  Tablet 0.5 milliGRAM(s) Oral every 8 hours  dexamethasone     Tablet   Oral   dexamethasone     Tablet 2 milliGRAM(s) Oral every 12 hours  docusate sodium 100 milliGRAM(s) Oral three times a day  doxazosin 1 milliGRAM(s) Oral at bedtime  enoxaparin Injectable 40 milliGRAM(s) SubCutaneous <User Schedule>  folic acid 1 milliGRAM(s) Oral daily  influenza   Vaccine 0.5 milliLiter(s) IntraMuscular once  levETIRAcetam 500 milliGRAM(s) Oral two times a day  levothyroxine 88 MICROGram(s) Oral daily  pantoprazole    Tablet 40 milliGRAM(s) Oral before breakfast  polyethylene glycol 3350 17 Gram(s) Oral two times a day  senna 2 Tablet(s) Oral at bedtime    MEDICATIONS  (PRN):  acetaminophen   Tablet .. 650 milliGRAM(s) Oral every 6 hours PRN Mild Pain (1 - 3)      Vital Signs Last 24 Hrs  T(C): 37.2 (28 Sep 2019 08:07), Max: 37.2 (28 Sep 2019 08:07)  T(F): 98.9 (28 Sep 2019 08:07), Max: 98.9 (28 Sep 2019 08:07)  HR: 96 (28 Sep 2019 08:07) (96 - 113)  BP: 124/83 (28 Sep 2019 08:07) (107/75 - 138/97)  BP(mean): --  RR: 26 (28 Sep 2019 08:07) (18 - 26)  SpO2: 93% (28 Sep 2019 08:07) (93% - 95%)  CAPILLARY BLOOD GLUCOSE        I&O's Summary    27 Sep 2019 07:01  -  28 Sep 2019 07:00  --------------------------------------------------------  IN: 600 mL / OUT: 400 mL / NET: 200 mL    28 Sep 2019 07:01  -  28 Sep 2019 11:42  --------------------------------------------------------  IN: 560 mL / OUT: 100 mL / NET: 460 mL        PHYSICAL EXAM:  GENERAL: NAD, well-developed  HEAD:  incision sites c/d/i  EYES: EOMI, PERRLA, conjunctiva and sclera clear  NECK: Supple, No JVD  CHEST/LUNG: Clear to auscultation bilaterally; No wheeze  HEART: Regular rate and rhythm; No murmurs, rubs, or gallops  ABDOMEN: Soft, Nontender, Nondistended; Bowel sounds present  EXTREMITIES:  2+ Peripheral Pulses, No clubbing, cyanosis, or edema  PSYCH: +flat affect, slow to answer questions but does answer appropriately, denies SI, +HI  NEUROLOGY: non-focal, AOx3  SKIN: No rashes or lesions      LABS:                        11.8   13.38 )-----------( 232      ( 27 Sep 2019 09:24 )             36.9     09-27    139  |  103  |  16  ----------------------------<  101<H>  4.2   |  25  |  0.88    Ca    9.3      27 Sep 2019 06:44        RADIOLOGY & ADDITIONAL TESTS:    Imaging Personally Reviewed:     Consultant(s) Notes Reviewed: Neurosurgery, Behavioral Health    Care Discussed with Consultants/Other Providers: Neurosurgery

## 2019-09-28 NOTE — PROGRESS NOTE ADULT - ASSESSMENT
65 yo female with PMH of HLD, thyroid nodule s/p thyroidectomy, OA, spinal fusion in 2003, brain hemangioma s/p resection in 2003 presented for resection of a large anterior parasagittal meningioma extending through the superior sagittal sinus and calvarium into the subcutaneous soft tissue. S/p selective cerebral angiography and embolization on 9/23 and craniotomy for meningioma resection on 9/24.    PROCEDURE: 9/23 s/p Angio/ embo of Meningioma                      9/24 S/P Craniotomy for meningioma     POD# 4/5    PLAN:  NEURO: continue neuro checks q 4 hrs, analgesics prn, decadron taper, continue keppra for seizure ppx, f/up MRI results  PSYCH: psych follow up appreciated; continue clonazepam, abilify, bupropion; will discuss w/ psych if 1:1 can be d/castillo  PULM: room air, encourage incentive spirometry  CV: HLD: cont atorvastatin  ENDO: continue levothyroxine for hypothyroidism  HEME/ONC:  H/H stable             DVT ppx: [X] SQL [] SQH [X] Venodynes bilaterally   RENAL: IVL, voiding  ID: afebrile  GI: regular diet, bowel regimen, cont protonix while on decadron  DISCHARGE PLANNING: PT/OT: Acute TBI      Assessment:  Please Check When Present   []  GCS  E   V  M     Heart Failure: []Acute, [] acute on chronic , []chronic  Heart Failure:  [] Diastolic (HFpEF), [] Systolic (HFrEF), []Combined (HFpEF and HFrEF), [] RHF, [] Pulm HTN, [] Other    [] DOMINIC, [] ATN, [] AIN, [] other  [] CKD1, [] CKD2, [] CKD 3, [] CKD 4, [] CKD 5, []ESRD    Encephalopathy: [] Metabolic, [] Hepatic, [] toxic, [] Neurological, [] Other    Abnormal Nurtitional Status: [] malnurtition (see nutrition note), [ ]underweight: BMI < 19, [] morbid obesity: BMI >40, [] Cachexia    [] Sepsis  [] hypovolemic shock,[] cardiogenic shock, [] hemorrhagic shock, [] neuogenic shock  [] Acute Respiratory Failure  []Cerebral edema, [] Brain compression/ herniation,   [] Functional quadriplegia  [] Acute blood loss anemia    Will discuss plan with Dr. Reyes  [facility] # [id number]

## 2019-09-28 NOTE — PROVIDER CONTACT NOTE (OTHER) - ASSESSMENT
urine appears more discolored, can get time correct but requires prompting, and continues to have delayed motor and speech reaction

## 2019-09-28 NOTE — PROVIDER CONTACT NOTE (OTHER) - ACTION/TREATMENT ORDERED:
AM labs will be put in. not putting in blood cultures in until temperature is out of parameters. continue to monitor.

## 2019-09-28 NOTE — PROVIDER CONTACT NOTE (OTHER) - SITUATION
patient respiratory rate is increased and intermittently tachycardic in the 110s. Oxygenation is above 90%, but appears to be lower than when she had it before surgery

## 2019-09-28 NOTE — PROGRESS NOTE ADULT - SUBJECTIVE AND OBJECTIVE BOX
SUBJECTIVE: Patient seen and examined at bedside.  Denies any pain, denies any other complaints. States that she wants to go home.    CHIEF COMPLAINT: headaches, brain  mass    OVERNIGHT EVENTS: none  Vital Signs Last 24 Hrs  T(C): 36.8 (28 Sep 2019 04:04), Max: 37.2 (27 Sep 2019 08:42)  T(F): 98.3 (28 Sep 2019 04:04), Max: 99 (27 Sep 2019 08:42)  HR: 111 (28 Sep 2019 04:04) (92 - 113)  BP: 138/97 (28 Sep 2019 04:04) (107/75 - 138/97)  BP(mean): --  RR: 18 (28 Sep 2019 04:04) (17 - 18)  SpO2: 94% (28 Sep 2019 04:04) (93% - 95%)    PHYSICAL EXAM:    General: No Acute Distress     Neurological: Alert and oriented to self, place and month/year.  Speech slow, clear.  Flat affect.  Able to follow commands, ROBLES.    Pulmonary: Clear to Auscultation, No Rales, No Rhonchi, No Wheezes     Cardiovascular: S1, S2, Regular Rate and Rhythm     Gastrointestinal: Soft, Nontender, Nondistended     Incision: clean and dr y    LABS:                        11.8   13.38 )-----------( 232      ( 27 Sep 2019 09:24 )             36.9    09-27    139  |  103  |  16  ----------------------------<  101<H>  4.2   |  25  |  0.88    Ca    9.3      27 Sep 2019 06:44      Hemoglobin A1C, Whole Blood: 5.5 % (09-23 @ 15:38)      09-27 @ 07:01  -  09-28 @ 07:00  --------------------------------------------------------  IN: 600 mL / OUT: 400 mL / NET: 200 mL      MEDICATIONS:  Antibiotics:    Neuro:  acetaminophen   Tablet .. 650 milliGRAM(s) Oral every 6 hours PRN Mild Pain (1 - 3)  ARIPiprazole 2 milliGRAM(s) Oral every 24 hours  buPROPion XL . 300 milliGRAM(s) Oral every 24 hours  clonazePAM  Tablet 0.5 milliGRAM(s) Oral every 8 hours  levETIRAcetam 500 milliGRAM(s) Oral two times a day    Cardiac:  doxazosin 1 milliGRAM(s) Oral at bedtime    Pulm:    GI/:  docusate sodium 100 milliGRAM(s) Oral three times a day  pantoprazole    Tablet 40 milliGRAM(s) Oral before breakfast  polyethylene glycol 3350 17 Gram(s) Oral two times a day  senna 2 Tablet(s) Oral at bedtime    Other:   atorvastatin 40 milliGRAM(s) Oral at bedtime  dexamethasone     Tablet   Oral   dexamethasone     Tablet 2 milliGRAM(s) Oral every 12 hours  enoxaparin Injectable 40 milliGRAM(s) SubCutaneous <User Schedule>  folic acid 1 milliGRAM(s) Oral daily  influenza   Vaccine 0.5 milliLiter(s) IntraMuscular once  levothyroxine 88 MICROGram(s) Oral daily    DIET: [X] Regular [] CCD [] Renal [] Puree [] Dysphagia [] Tube Feeds:     IMAGING: < from: CT Head No Cont (09.25.19 @ 08:11) >  Patient is status post by parietal craniotomy with a left frontal   craniectomy and mesh cranioplasty.. Hypodensity and foci of air are   present in the region of the previously identified meningioma. Bifrontal   extra-axial air is postsurgical. High midline hyperdense material is   present, representing minimal hemorrhage versus postsurgical material.    No acute intracranial hemorrhage, mass effect or midline shift.    Ventricles and cortical sulci are within normal limits.     < end of copied text >    < from: [facility] Duplex Lower Ext Vein Scan, Bilat (09.24.19 @ 17:15) >    No evidence of deep venous thrombosis in either lower extremity.    < end of copied text > SUBJECTIVE: Patient seen and examined at bedside.  Denies any pain, denies any other complaints. on 1:1    CHIEF COMPLAINT: headaches, brain  mass    OVERNIGHT EVENTS: none  Vital Signs Last 24 Hrs  T(C): 36.8 (28 Sep 2019 04:04), Max: 37.2 (27 Sep 2019 08:42)  T(F): 98.3 (28 Sep 2019 04:04), Max: 99 (27 Sep 2019 08:42)  HR: 111 (28 Sep 2019 04:04) (92 - 113)  BP: 138/97 (28 Sep 2019 04:04) (107/75 - 138/97)  BP(mean): --  RR: 18 (28 Sep 2019 04:04) (17 - 18)  SpO2: 94% (28 Sep 2019 04:04) (93% - 95%)    PHYSICAL EXAM:    General: No Acute Distress     Neurological: Very slow to respond, oriented to self, place and month/year.  Speech slow, clear.  Flat affect.  Able to follow commands, ROBLES.    Pulmonary: Clear to Auscultation, No Rales, No Rhonchi, No Wheezes     Cardiovascular: S1, S2, Regular Rate and Rhythm     Gastrointestinal: Soft, Nontender, Nondistended     Incision: +sutures, clean and dry    LABS:                        11.8   13.38 )-----------( 232      ( 27 Sep 2019 09:24 )             36.9    09-27    139  |  103  |  16  ----------------------------<  101<H>  4.2   |  25  |  0.88    Ca    9.3      27 Sep 2019 06:44      Hemoglobin A1C, Whole Blood: 5.5 % (09-23 @ 15:38)      09-27 @ 07:01  -  09-28 @ 07:00  --------------------------------------------------------  IN: 600 mL / OUT: 400 mL / NET: 200 mL      MEDICATIONS:  Antibiotics:    Neuro:  acetaminophen   Tablet .. 650 milliGRAM(s) Oral every 6 hours PRN Mild Pain (1 - 3)  ARIPiprazole 2 milliGRAM(s) Oral every 24 hours  buPROPion XL . 300 milliGRAM(s) Oral every 24 hours  clonazePAM  Tablet 0.5 milliGRAM(s) Oral every 8 hours  levETIRAcetam 500 milliGRAM(s) Oral two times a day    Cardiac:  doxazosin 1 milliGRAM(s) Oral at bedtime    Pulm:    GI/:  docusate sodium 100 milliGRAM(s) Oral three times a day  pantoprazole    Tablet 40 milliGRAM(s) Oral before breakfast  polyethylene glycol 3350 17 Gram(s) Oral two times a day  senna 2 Tablet(s) Oral at bedtime    Other:   atorvastatin 40 milliGRAM(s) Oral at bedtime  dexamethasone     Tablet   Oral   dexamethasone     Tablet 2 milliGRAM(s) Oral every 12 hours  enoxaparin Injectable 40 milliGRAM(s) SubCutaneous <User Schedule>  folic acid 1 milliGRAM(s) Oral daily  influenza   Vaccine 0.5 milliLiter(s) IntraMuscular once  levothyroxine 88 MICROGram(s) Oral daily    DIET: [X] Regular [] CCD [] Renal [] Puree [] Dysphagia [] Tube Feeds:     IMAGING: < from: CT Head No Cont (09.25.19 @ 08:11) >  Patient is status post by parietal craniotomy with a left frontal   craniectomy and mesh cranioplasty.. Hypodensity and foci of air are   present in the region of the previously identified meningioma. Bifrontal   extra-axial air is postsurgical. High midline hyperdense material is   present, representing minimal hemorrhage versus postsurgical material.    No acute intracranial hemorrhage, mass effect or midline shift.    Ventricles and cortical sulci are within normal limits.     < end of copied text >    < from: [facility] Duplex Lower Ext Vein Scan, Bilat (09.24.19 @ 17:15) >    No evidence of deep venous thrombosis in either lower extremity.    < end of copied text >

## 2019-09-29 DIAGNOSIS — R06.02 SHORTNESS OF BREATH: ICD-10-CM

## 2019-09-29 DIAGNOSIS — N39.0 URINARY TRACT INFECTION, SITE NOT SPECIFIED: ICD-10-CM

## 2019-09-29 LAB
APPEARANCE UR: ABNORMAL
BILIRUB UR-MCNC: NEGATIVE — SIGNIFICANT CHANGE UP
COLOR SPEC: YELLOW — SIGNIFICANT CHANGE UP
DIFF PNL FLD: ABNORMAL
GLUCOSE UR QL: NEGATIVE — SIGNIFICANT CHANGE UP
KETONES UR-MCNC: ABNORMAL
LEUKOCYTE ESTERASE UR-ACNC: ABNORMAL
NITRITE UR-MCNC: POSITIVE
PH UR: 6 — SIGNIFICANT CHANGE UP (ref 5–8)
PROT UR-MCNC: ABNORMAL
SP GR SPEC: 1.02 — SIGNIFICANT CHANGE UP (ref 1.01–1.02)
UROBILINOGEN FLD QL: SIGNIFICANT CHANGE UP

## 2019-09-29 PROCEDURE — 93970 EXTREMITY STUDY: CPT | Mod: 26

## 2019-09-29 PROCEDURE — 99233 SBSQ HOSP IP/OBS HIGH 50: CPT

## 2019-09-29 RX ORDER — CEFTRIAXONE 500 MG/1
1000 INJECTION, POWDER, FOR SOLUTION INTRAMUSCULAR; INTRAVENOUS EVERY 24 HOURS
Refills: 0 | Status: DISCONTINUED | OUTPATIENT
Start: 2019-09-29 | End: 2019-10-02

## 2019-09-29 RX ADMIN — PANTOPRAZOLE SODIUM 40 MILLIGRAM(S): 20 TABLET, DELAYED RELEASE ORAL at 05:14

## 2019-09-29 RX ADMIN — Medication 0.5 MILLIGRAM(S): at 15:35

## 2019-09-29 RX ADMIN — Medication 1 MILLIGRAM(S): at 21:15

## 2019-09-29 RX ADMIN — ATORVASTATIN CALCIUM 40 MILLIGRAM(S): 80 TABLET, FILM COATED ORAL at 21:15

## 2019-09-29 RX ADMIN — BUPROPION HYDROCHLORIDE 300 MILLIGRAM(S): 150 TABLET, EXTENDED RELEASE ORAL at 05:13

## 2019-09-29 RX ADMIN — ARIPIPRAZOLE 2 MILLIGRAM(S): 15 TABLET ORAL at 05:13

## 2019-09-29 RX ADMIN — Medication 1 ENEMA: at 08:03

## 2019-09-29 RX ADMIN — CEFTRIAXONE 100 MILLIGRAM(S): 500 INJECTION, POWDER, FOR SOLUTION INTRAMUSCULAR; INTRAVENOUS at 05:12

## 2019-09-29 RX ADMIN — LEVETIRACETAM 500 MILLIGRAM(S): 250 TABLET, FILM COATED ORAL at 05:14

## 2019-09-29 RX ADMIN — Medication 100 MILLIGRAM(S): at 05:14

## 2019-09-29 RX ADMIN — SENNA PLUS 2 TABLET(S): 8.6 TABLET ORAL at 21:15

## 2019-09-29 RX ADMIN — Medication 88 MICROGRAM(S): at 05:13

## 2019-09-29 RX ADMIN — Medication 100 MILLIGRAM(S): at 15:35

## 2019-09-29 RX ADMIN — ENOXAPARIN SODIUM 40 MILLIGRAM(S): 100 INJECTION SUBCUTANEOUS at 17:02

## 2019-09-29 RX ADMIN — Medication 1 MILLIGRAM(S): at 15:35

## 2019-09-29 RX ADMIN — LEVETIRACETAM 500 MILLIGRAM(S): 250 TABLET, FILM COATED ORAL at 17:03

## 2019-09-29 RX ADMIN — POLYETHYLENE GLYCOL 3350 17 GRAM(S): 17 POWDER, FOR SOLUTION ORAL at 05:13

## 2019-09-29 RX ADMIN — Medication 0.5 MILLIGRAM(S): at 21:15

## 2019-09-29 RX ADMIN — Medication 100 MILLIGRAM(S): at 21:15

## 2019-09-29 RX ADMIN — Medication 2 MILLIGRAM(S): at 05:14

## 2019-09-29 RX ADMIN — Medication 0.5 MILLIGRAM(S): at 05:12

## 2019-09-29 RX ADMIN — POLYETHYLENE GLYCOL 3350 17 GRAM(S): 17 POWDER, FOR SOLUTION ORAL at 17:03

## 2019-09-29 NOTE — PROGRESS NOTE ADULT - ASSESSMENT
PROCEDURE: 9/24/19 s/p craniotomy for frontal tumor meningioma  PAST MEDICAL & SURGICAL HISTORY:  HLD (hyperlipidemia)  Thyroid nodule: 1999  OA (osteoarthritis)  Depression with anxiety  S/P resection of meningioma: 2003  S/P thyroidectomy: 1999 for nodule  S/P lumbar spinal fusion: 2003        PLAN:  Neuro: neuro checks q 4, vitals q 4   tylenol for pain  history of suicidal ideations/ hi:  denies it now.   on 1 to 1, with psych clearing for discharge  continue abilify   continue wellbutrin, nd clonopin  decadron raman for cerebral edema completed  continue keppra till 10/1 for seizure prophylaxis   Respiratory: incentive spirometry no respiratory distress but reported yesterday to have SOB  LE dopp pending   CV:  lipitor for hyperlipidemia   Endocrine:  synthyroid for hypothyroidism,   Heme/Onc: stable            DVT ppx: lovenox and scds  Renal: ivl voiding   ID: afebrile, had dirty looking urine yesterday + UA started on ceftriaxone follow up culture   GI:  tolerating diet, no bm yet add miralax   PT/OT: acute tbi rehab   Will discuss with Dr. Reyes       Assessment:  Please Check When Present   [] Acute blood loss anemia  []Cerebral edema, [] Brain compression/ herniation,     []  GCS  E   V  M     Heart Failure: []Acute, [] acute on chronic , []chronic  Heart Failure:  [] Diastolic (HFpEF), [] Systolic (HFrEF), []Combined (HFpEF and HFrEF), [] RHF, [] Pulm HTN, [] Other    [] DOMINIC, [] ATN, [] AIN, [] other  [] CKD1, [] CKD2, [] CKD 3, [] CKD 4, [] CKD 5, []ESRD    Encephalopathy: [] Metabolic, [] Hepatic, [] toxic, [] Neurological, [] Other    Abnormal Nurtitional Status: [] malnurtition (see nutrition note), [ ]underweight: BMI < 19, [] morbid obesity: BMI >40, [] Cachexia  [] Functional quadriplegia  [] Sepsis  [] hypovolemic shock,[] cardiogenic shock, [] hemorrhagic shock, [] neuogenic shock  [] Acute Respiratory Failure          [facility] # [id number]

## 2019-09-29 NOTE — PROGRESS NOTE ADULT - PROBLEM SELECTOR PLAN 3
Suspect hypoactive delirium - multifactorial etiology - brain surgery, anesthesia, pain medications, psych medications, steroids (decadron). possibly also worse in setting of UTI  - Psychiatry following  - Oxycodone stopped. Klonopin dosing decreased  - Try to taper decadron to off as soon as possible  - Supportive care  - ceftriaxone for UTI as above

## 2019-09-29 NOTE — PROGRESS NOTE ADULT - SUBJECTIVE AND OBJECTIVE BOX
Anastacia Gomez MD ([facility] Hospitalist)  Pager: [phone]  (During off hours please page [phone])      Patient is a 64y old  Female who presents with a chief complaint of brain lesion (28 Sep 2019 07:52)      SUBJECTIVE / OVERNIGHT EVENTS: Overnight, patient observed to be more tachypneic, tachycardic, and short of breath. Urine cloudy appearing, thus UA sent, grossly positive. Started on ceftriaxone overnight. Patient still with flat affect this AM but quicker with responses albeit still slows. Feels well this AM. Denies and SI/HI today. Mood slightly improved. duplex of lower extremities pending.     MEDICATIONS  (STANDING):  ARIPiprazole 2 milliGRAM(s) Oral every 24 hours  atorvastatin 40 milliGRAM(s) Oral at bedtime  buPROPion XL . 300 milliGRAM(s) Oral every 24 hours  cefTRIAXone   IVPB 1000 milliGRAM(s) IV Intermittent every 24 hours  clonazePAM  Tablet 0.5 milliGRAM(s) Oral every 8 hours  docusate sodium 100 milliGRAM(s) Oral three times a day  doxazosin 1 milliGRAM(s) Oral at bedtime  enoxaparin Injectable 40 milliGRAM(s) SubCutaneous <User Schedule>  folic acid 1 milliGRAM(s) Oral daily  influenza   Vaccine 0.5 milliLiter(s) IntraMuscular once  levETIRAcetam 500 milliGRAM(s) Oral two times a day  levothyroxine 88 MICROGram(s) Oral daily  pantoprazole    Tablet 40 milliGRAM(s) Oral before breakfast  polyethylene glycol 3350 17 Gram(s) Oral two times a day  senna 2 Tablet(s) Oral at bedtime    MEDICATIONS  (PRN):  acetaminophen   Tablet .. 650 milliGRAM(s) Oral every 6 hours PRN Mild Pain (1 - 3)      Vital Signs Last 24 Hrs  T(C): 37.1 (29 Sep 2019 07:31), Max: 37.1 (29 Sep 2019 07:31)  T(F): 98.8 (29 Sep 2019 07:31), Max: 98.8 (29 Sep 2019 07:31)  HR: 110 (29 Sep 2019 07:31) (64 - 117)  BP: 115/77 (29 Sep 2019 07:31) (107/66 - 119/71)  BP(mean): --  RR: 20 (29 Sep 2019 07:31) (18 - 24)  SpO2: 94% (29 Sep 2019 07:31) (94% - 96%)  CAPILLARY BLOOD GLUCOSE        I&O's Summary    28 Sep 2019 07:  -  29 Sep 2019 07:00  --------------------------------------------------------  IN: 920 mL / OUT: 1550 mL / NET: -630 mL    29 Sep 2019 07:  -  29 Sep 2019 11:52  --------------------------------------------------------  IN: 360 mL / OUT: 0 mL / NET: 360 mL        PHYSICAL EXAM:  GENERAL: NAD, well-developed  HEAD:  incision sites c/d/i  EYES: EOMI, PERRLA, conjunctiva and sclera clear  NECK: Supple, No JVD  CHEST/LUNG: Clear to auscultation bilaterally; No wheeze  HEART: Regular rate and rhythm; No murmurs, rubs, or gallops  ABDOMEN: Soft, Nontender, Nondistended; Bowel sounds present  EXTREMITIES:  2+ Peripheral Pulses, No clubbing, cyanosis, or edema  PSYCH: +flat affect, still slow to answer questions but quicker than yesterdaydoes answer appropriately, denies SI nor HI today. mood seems slightly improved.  NEUROLOGY: non-focal, AOx3  SKIN: No rashes or lesions      LABS:    Urinalysis Basic - ( 28 Sep 2019 22:57 )    Color: Yellow / Appearance: Turbid / S.023 / pH: x  Gluc: x / Ketone: Small  / Bili: Negative / Urobili: <2 mg/dL   Blood: x / Protein: 100 mg/dL / Nitrite: Positive   Leuk Esterase: Large / RBC: 72 /HPF / WBC >720 /HPF   Sq Epi: x / Non Sq Epi: 2 /HPF / Bacteria: TNTC        RADIOLOGY & ADDITIONAL TESTS:    Imaging Personally Reviewed:   CXR with no acute cardiopulmonary process    Consultant(s) Notes Reviewed: Neurosurgery    Care Discussed with Consultants/Other Providers:

## 2019-09-29 NOTE — PROGRESS NOTE ADULT - SUBJECTIVE AND OBJECTIVE BOX
SUBJECTIVE: Patient seen and examined.  Yesterday had homocideal ideation of killing her son.  remained on 1 to 1.  today denies and si/hi ideation.      Vital Signs Last 24 Hrs  T(C): 37.1 (29 Sep 2019 07:31), Max: 37.2 (28 Sep 2019 08:07)  T(F): 98.8 (29 Sep 2019 07:31), Max: 98.9 (28 Sep 2019 08:07)  HR: 110 (29 Sep 2019 07:31) (64 - 117)  BP: 115/77 (29 Sep 2019 07:31) (107/66 - 124/83)  BP(mean): --  RR: 20 (29 Sep 2019 07:31) (18 - 26)  SpO2: 94% (29 Sep 2019 07:31) (93% - 96%)    PHYSICAL EXAM:    Constitutional: No Acute Distress     Neurological: AOx3, Following Commands, Moving all Extremities flat affect, still avoids eye contact     Motor exam:          Upper extremity                         Delt     Bicep     Tricep    HG                                                 R         5/5        5/5        5/5       5/5                                               L          5/5        5/5        5/5       5/5          Lower extremity                        HF         KF        KE       DF         PF                                                  R        5/5        5/5        5/5       5/5         5/5                                               L         5/5        5/5       5/5       5/5          5/5                                                 Sensation: [] intact to light touch  [] decreased:     Pulmonary: Clear to Auscultation, No rales, No rhonchi, No wheezes     Cardiovascular: S1, S2, Regular rate and rhythm     Gastrointestinal: Soft, Non-tender, Non-distended     Extremities: No calf tenderness     Incision: c/d/i + sutures   LABS:                        11.8   13.38 )-----------( 232      ( 27 Sep 2019 09:24 )             36.9        Hemoglobin A1C, Whole Blood: 5.5 % (09-23-19 @ 15:38)      09-28 @ 07:01  -  09-29 @ 07:00  --------------------------------------------------------  IN: 920 mL / OUT: 1550 mL / NET: -630 mL        MEDICATIONS:  Anticoagulation:   enoxaparin Injectable 40 milliGRAM(s) SubCutaneous <User Schedule>    Antibiotics:  cefTRIAXone   IVPB 1000 milliGRAM(s) IV Intermittent every 24 hours    Endo:  atorvastatin 40 milliGRAM(s) Oral at bedtime  levothyroxine 88 MICROGram(s) Oral daily    Neuro:  acetaminophen   Tablet .. 650 milliGRAM(s) Oral every 6 hours PRN Mild Pain (1 - 3)  ARIPiprazole 2 milliGRAM(s) Oral every 24 hours  buPROPion XL . 300 milliGRAM(s) Oral every 24 hours  clonazePAM  Tablet 0.5 milliGRAM(s) Oral every 8 hours  levETIRAcetam 500 milliGRAM(s) Oral two times a day    Cardiac:  doxazosin 1 milliGRAM(s) Oral at bedtime    Pulm:    GI/:  docusate sodium 100 milliGRAM(s) Oral three times a day  pantoprazole    Tablet 40 milliGRAM(s) Oral before breakfast  polyethylene glycol 3350 17 Gram(s) Oral two times a day  senna 2 Tablet(s) Oral at bedtime    Other:   folic acid 1 milliGRAM(s) Oral daily  influenza   Vaccine 0.5 milliLiter(s) IntraMuscular once    DIET: regular    image:  < from: MR Head w/wo IV Cont (09.27.19 @ 21:25) >  Postsurgical changes with resection of midline bifrontal meningioma with   anticipated postsurgical change. Trace residual subarachnoid and   extra-axial hemorrhage. Mild restricted diffusion in the bifrontal lobes   may represent postsurgical change versus ischemia.    < end of copied text >

## 2019-09-30 LAB
CULTURE RESULTS: SIGNIFICANT CHANGE UP
SPECIMEN SOURCE: SIGNIFICANT CHANGE UP
SURGICAL PATHOLOGY STUDY: SIGNIFICANT CHANGE UP

## 2019-09-30 PROCEDURE — 99232 SBSQ HOSP IP/OBS MODERATE 35: CPT

## 2019-09-30 RX ADMIN — ATORVASTATIN CALCIUM 40 MILLIGRAM(S): 80 TABLET, FILM COATED ORAL at 22:20

## 2019-09-30 RX ADMIN — Medication 1 MILLIGRAM(S): at 22:20

## 2019-09-30 RX ADMIN — POLYETHYLENE GLYCOL 3350 17 GRAM(S): 17 POWDER, FOR SOLUTION ORAL at 17:41

## 2019-09-30 RX ADMIN — Medication 650 MILLIGRAM(S): at 18:53

## 2019-09-30 RX ADMIN — Medication 0.5 MILLIGRAM(S): at 05:59

## 2019-09-30 RX ADMIN — Medication 88 MICROGRAM(S): at 05:58

## 2019-09-30 RX ADMIN — Medication 100 MILLIGRAM(S): at 13:44

## 2019-09-30 RX ADMIN — SENNA PLUS 2 TABLET(S): 8.6 TABLET ORAL at 22:21

## 2019-09-30 RX ADMIN — Medication 650 MILLIGRAM(S): at 11:00

## 2019-09-30 RX ADMIN — Medication 1 MILLIGRAM(S): at 13:44

## 2019-09-30 RX ADMIN — Medication 0.5 MILLIGRAM(S): at 13:44

## 2019-09-30 RX ADMIN — ENOXAPARIN SODIUM 40 MILLIGRAM(S): 100 INJECTION SUBCUTANEOUS at 17:41

## 2019-09-30 RX ADMIN — LEVETIRACETAM 500 MILLIGRAM(S): 250 TABLET, FILM COATED ORAL at 17:41

## 2019-09-30 RX ADMIN — CEFTRIAXONE 100 MILLIGRAM(S): 500 INJECTION, POWDER, FOR SOLUTION INTRAMUSCULAR; INTRAVENOUS at 06:00

## 2019-09-30 RX ADMIN — PANTOPRAZOLE SODIUM 40 MILLIGRAM(S): 20 TABLET, DELAYED RELEASE ORAL at 05:58

## 2019-09-30 RX ADMIN — LEVETIRACETAM 500 MILLIGRAM(S): 250 TABLET, FILM COATED ORAL at 05:58

## 2019-09-30 RX ADMIN — BUPROPION HYDROCHLORIDE 300 MILLIGRAM(S): 150 TABLET, EXTENDED RELEASE ORAL at 05:58

## 2019-09-30 RX ADMIN — Medication 100 MILLIGRAM(S): at 22:21

## 2019-09-30 RX ADMIN — Medication 100 MILLIGRAM(S): at 05:58

## 2019-09-30 RX ADMIN — Medication 650 MILLIGRAM(S): at 19:26

## 2019-09-30 RX ADMIN — Medication 0.5 MILLIGRAM(S): at 22:21

## 2019-09-30 RX ADMIN — POLYETHYLENE GLYCOL 3350 17 GRAM(S): 17 POWDER, FOR SOLUTION ORAL at 05:59

## 2019-09-30 RX ADMIN — ARIPIPRAZOLE 2 MILLIGRAM(S): 15 TABLET ORAL at 06:09

## 2019-09-30 RX ADMIN — Medication 650 MILLIGRAM(S): at 10:16

## 2019-09-30 NOTE — PROGRESS NOTE BEHAVIORAL HEALTH - SUMMARY
63yo  female, , domiciled with , has 5 adult children (45,42,39,38,28), employed as a , currently in psych tx with dr. hu since 2003, +PPHx of depression and suicide attempt (overdose on benzo. 2003), no hx of violence, substance abuse, + PMhx for HLD, thyroid nodule, osteoarthritis s/p spinal fusion in 2003 and gel injections to both knees and brain hemangioma s/p resection in 2003. pt admitted for selective cerebral angiography and embolization on 9/23. Psych CL for pt c/o depression, flat affect, SI. pt reports she has been feeling depressed and reports suicidal ideations, denies suicidal intent and plan. pt denies HI/AVH, liz, psychosis, denies smoking, etoh and illicit drug use. Pt noted to have cognitive deficits, impaired attention, difficulty finding words, most likely due to delirium, recent brain surgery.
65yo  female, , domiciled with , has 5 adult children (45,42,39,38,28), employed as a , currently in psych tx with dr. hu since 2003, +PPHx of depression and suicide attempt (overdose on benzo. 2003), no hx of violence, substance abuse, + PMhx for HLD, thyroid nodule, osteoarthritis s/p spinal fusion in 2003 and gel injections to both knees and brain hemangioma s/p resection in 2003. pt admitted for selective cerebral angiography and embolization on 9/23. Psych CL for pt c/o depression, flat affect, SI. pt reports she has been feeling depressed and reports suicidal ideations, denies suicidal intent and plan. pt denies HI/AVH, liz, psychosis, denies smoking, etoh and illicit drug use. Pt noted to have cognitive deficits, impaired attention, difficulty finding words, most likely due to delirium, recent brain surgery.

## 2019-09-30 NOTE — PROGRESS NOTE BEHAVIORAL HEALTH - NSBHCONSULTRECOMMENDOTHER_PSY_A_CORE FT
as recommended by Dr. Mcclellan continue to consult with neuro regarding typical recovery symptoms from cerebral angiography with pipeline stent and midline craniotomy for tumor resection
as recommended by Dr. Mcclellan continue to consult with neuro regarding typical recovery symptoms from cerebral angiography with pipeline stent and midline craniotomy for tumor resection

## 2019-09-30 NOTE — PROGRESS NOTE BEHAVIORAL HEALTH - RISK ASSESSMENT
risk factor include recent SI, h/o suicide attempts, medical issues   protective factors include no HIIP/AVH, no clear intent, plan, no hx of substance abuse, no psychosis, engaged in work, domiciled, intact marriage, social supports, future oriented . pt overall low acute risk for suicide attempt
risk factor include recent SI, h/o suicide attempts, medical issues   protective factors include no HIIP/AVH, no clear intent, plan, no hx of substance abuse, no psychosis, engaged in work, domiciled, intact marriage, social supports, future oriented . pt overall low acute risk for suicide attempt

## 2019-09-30 NOTE — PROGRESS NOTE BEHAVIORAL HEALTH - NSBHCHARTREVIEWIMAGING_PSY_A_CORE FT
< from: CT Head No Cont (09.25.19 @ 08:11) >    EXAM:  CT BRAIN                          PROCEDURE DATE:  09/25/2019      INTERPRETATION:  CLINICAL INFORMATION: Status post craniotomy for falcine   meningioma.    TECHNIQUE: Noncontrast axial CT images were acquired through the head.   Imaging was performed on the portable unit.    COMPARISON STUDY: IR neuro procedure 9/23/2019; MR brain 8/13/2019.    FINDINGS:     Patient is status post by parietal craniotomy with a left frontal   craniectomy and mesh cranioplasty.. Hypodensity and foci of air are   present in the region of the previously identified meningioma. Bifrontal   extra-axial air is postsurgical. High midline hyperdense material is   present, representing minimal hemorrhage versus postsurgical material.    No acute intracranial hemorrhage, mass effect or midline shift.    Ventricles and cortical sulci are within normal limits.       IMPRESSION:     Status post resection of midline frontal meningioma with expected   postoperative changes.                YVONNE GUZMAN M.D., RADIOLOGY RESIDENT  This document has been electronically signed.  ALTA SANCHEZ M.D., ATTENDING RADIOLOGIST  This document has been anjelica    < end of copied text >    ctronically signed. Sep 25 2019 10:11AM
< from: CT Head No Cont (09.25.19 @ 08:11) >    EXAM:  CT BRAIN                          PROCEDURE DATE:  09/25/2019      INTERPRETATION:  CLINICAL INFORMATION: Status post craniotomy for falcine   meningioma.    TECHNIQUE: Noncontrast axial CT images were acquired through the head.   Imaging was performed on the portable unit.    COMPARISON STUDY: IR neuro procedure 9/23/2019; MR brain 8/13/2019.    FINDINGS:     Patient is status post by parietal craniotomy with a left frontal   craniectomy and mesh cranioplasty.. Hypodensity and foci of air are   present in the region of the previously identified meningioma. Bifrontal   extra-axial air is postsurgical. High midline hyperdense material is   present, representing minimal hemorrhage versus postsurgical material.    No acute intracranial hemorrhage, mass effect or midline shift.    Ventricles and cortical sulci are within normal limits.       IMPRESSION:     Status post resection of midline frontal meningioma with expected   postoperative changes.                YVONNE GUZMAN M.D., RADIOLOGY RESIDENT  This document has been electronically signed.  ALTA SANCHEZ M.D., ATTENDING RADIOLOGIST  This document has been anjelica    < end of copied text >    ctronically signed. Sep 25 2019 10:11AM

## 2019-09-30 NOTE — PROGRESS NOTE ADULT - SUBJECTIVE AND OBJECTIVE BOX
SUBJECTIVE: Pt seen and examined, resting comfortably in bed, 1:1 discontinued per psychiatry, daughter and  at bedside, updated on plan    OVERNIGHT EVENTS: none    Vital Signs Last 24 Hrs  T(C): 37 (30 Sep 2019 07:17), Max: 37 (30 Sep 2019 00:00)  T(F): 98.6 (30 Sep 2019 07:17), Max: 98.6 (30 Sep 2019 00:00)  HR: 95 (30 Sep 2019 07:17) (93 - 117)  BP: 103/71 (30 Sep 2019 07:17) (103/71 - 128/85)  BP(mean): --  RR: 18 (30 Sep 2019 07:17) (18 - 18)  SpO2: 95% (30 Sep 2019 07:17) (94% - 96%)    PHYSICAL EXAM:    General: No Acute Distress     Neurological: Awake, alert oriented to person, place and time, Following Commands, PERRL, EOMI, Face Symmetrical, Speech Fluent, Moving all extremities, Muscle Strength normal in all four extremities, No Drift, Sensation to Light Touch Intact    Pulmonary: Clear to Auscultation, No Rales, No Rhonchi, No Wheezes     Cardiovascular: S1, S2, Regular Rate and Rhythm     Gastrointestinal: Soft, Nontender, Nondistended     Incision: crani sutures c/d/i    LABS:         Hemoglobin A1C, Whole Blood: 5.5 % (09-23 @ 15:38)      09-29 @ 07:01  -  09-30 @ 07:00  --------------------------------------------------------  IN: 360 mL / OUT: 400 mL / NET: -40 mL    09-30 @ 07:01  -  09-30 @ 12:14  --------------------------------------------------------  IN: 360 mL / OUT: 0 mL / NET: 360 mL      DRAINS: none    MEDICATIONS:  Antibiotics:  cefTRIAXone   IVPB 1000 milliGRAM(s) IV Intermittent every 24 hours    Neuro:  acetaminophen   Tablet .. 650 milliGRAM(s) Oral every 6 hours PRN Mild Pain (1 - 3)  ARIPiprazole 2 milliGRAM(s) Oral every 24 hours  buPROPion XL . 300 milliGRAM(s) Oral every 24 hours  clonazePAM  Tablet 0.5 milliGRAM(s) Oral every 8 hours  levETIRAcetam 500 milliGRAM(s) Oral two times a day    Cardiac:  doxazosin 1 milliGRAM(s) Oral at bedtime    Pulm:    GI/:  docusate sodium 100 milliGRAM(s) Oral three times a day  pantoprazole    Tablet 40 milliGRAM(s) Oral before breakfast  polyethylene glycol 3350 17 Gram(s) Oral two times a day  senna 2 Tablet(s) Oral at bedtime    Other:   atorvastatin 40 milliGRAM(s) Oral at bedtime  enoxaparin Injectable 40 milliGRAM(s) SubCutaneous <User Schedule>  folic acid 1 milliGRAM(s) Oral daily  influenza   Vaccine 0.5 milliLiter(s) IntraMuscular once  levothyroxine 88 MICROGram(s) Oral daily    DIET: [x] Regular [] CCD [] Renal [] Puree [] Dysphagia [] Tube Feeds:     IMAGING:   < from: CT Head No Cont (09.25.19 @ 08:11) >    FINDINGS:     Patient is status post by parietal craniotomy with a left frontal   craniectomy and mesh cranioplasty.. Hypodensity and foci of air are   present in the region of the previously identified meningioma. Bifrontal   extra-axial air is postsurgical. High midline hyperdense material is   present, representing minimal hemorrhage versus postsurgical material.    No acute intracranial hemorrhage, mass effect or midline shift.    Ventricles and cortical sulci are within normal limits.       IMPRESSION:     Status post resection of midline frontal meningioma with expected   postoperative changes.    < end of copied text >  < from: VA Duplex Lower Ext Vein Scan, Bilat (09.29.19 @ 11:19) >  FINDINGS:    There is normal compressibility of the bilateral common femoral, femoral   and popliteal veins.     Doppler examination shows normal spontaneous and phasic flow.    No calf vein thrombosis is evident.    IMPRESSION:     No evidence of deep venous thrombosis in either lower extremity.    < end of copied text >  < from: MR Head w/wo IV Cont (09.27.19 @ 21:25) >    Impression:    Postsurgical changes with resection of midline bifrontal meningioma with   anticipated postsurgical change. Trace residual subarachnoid and   extra-axial hemorrhage. Mild restricted diffusion in the bifrontal lobes   may represent postsurgical change versus ischemia.    < end of copied text >

## 2019-09-30 NOTE — PROGRESS NOTE BEHAVIORAL HEALTH - SECONDARY DX1
Recurrent major depressive disorder, in partial remission
Recurrent major depressive disorder, in partial remission

## 2019-09-30 NOTE — PROGRESS NOTE BEHAVIORAL HEALTH - ADDITIONAL DETAILS / COMMENTS
short term memory impaired- 1 of 3 word correct on recall  attention impaired- 1 letter correct when spelling "THINK" backwards
short term memory impaired- 1 of 3 word correct on recall  attention impaired- 1 letter correct when spelling "THINK" backwards

## 2019-09-30 NOTE — PROGRESS NOTE ADULT - ASSESSMENT
64 year old woman with PMH HLD, thyroid nodule s/p thyroidectomy 1999, osteoarthritis s/p spinal fusion in 2003 and gel injections to both knees and brain hemangioma s/p resection in 2003 who noted swelling on top of her head and went to PCP then neurosurgeon now scheduled for cerebral angiography with pipeline stent on 9/23/19 followed by midline craniotomy for tumor resection the following day on 9/24/19.  She denies headaches, dizziness or pain upon palpation.  She is very anxious about the procedure and felt suicidal last week but has been seen by her psychiatrist twice since and will continue regularly scheduled appointments with her.    Per Dr. Mcclellan, her psychiatrist re: letter stating that she is stable for planned procedure on 9/23/19.        PROCEDURE: 9/23 s/p angio/embolization of meningioma, 9/24 s/p Craniotomy for meningioma     POD#6    PLAN:  Neuro:   - 1:1 discontinued per Psychiatry  - post op MRI shows post op changes, trace residual subarachnoid and extra axial hemorrhage, mild restricted diffusion in bifrontal lobes  - UTI- likely contaminant, > 3 organisms, on ceftriaxone  - depression/anxiety- followed by Psychiatry- continue abilify, wellbutrin and klonopin (dose decreased to 0.5 tid)  - continue keppra for seizure ppx  - completed course of steroids  - hypothyroid- continue synthroid  - bowel regimen  Respiratory:   - encouraged incentive spirometry  CV:  - vitals stable  - HLD- on statin   DVT ppx:   - venodynes, sq lovenox  PT/OT:   Acute rehab    Assessment:  Please Check When Present   []  GCS  E   V  M     Heart Failure: []Acute, [] acute on chronic , []chronic  Heart Failure:  [] Diastolic (HFpEF), [] Systolic (HFrEF), []Combined (HFpEF and HFrEF), [] RHF, [] Pulm HTN, [] Other    [] DOMINIC, [] ATN, [] AIN, [] other  [] CKD1, [] CKD2, [] CKD 3, [] CKD 4, [] CKD 5, []ESRD    Encephalopathy: [] Metabolic, [] Hepatic, [] toxic, [] Neurological, [] Other    Abnormal Nurtitional Status: [] malnurtition (see nutrition note), [ ]underweight: BMI < 19, [] morbid obesity: BMI >40, [] Cachexia    [] Sepsis  [] hypovolemic shock,[] cardiogenic shock, [] hemorrhagic shock, [] neuogenic shock  [] Acute Respiratory Failure  []Cerebral edema, [] Brain compression/ herniation,   [] Functional quadriplegia  [] Acute blood loss anemia    Spectralink # [id number]

## 2019-09-30 NOTE — PROGRESS NOTE BEHAVIORAL HEALTH - NSBHCONSULTMEDS_PSY_A_CORE FT
cont abilify 2mg po daily, wellbutrin xl 300mg daily. can decrease klonopin to 0.5mg po tid given recent confusion, delirium. haldol 1mg po/iv q6hr prn agitation. check ekg . hold off doxepin for now.
cont abilify 2mg po daily, wellbutrin xl 300mg daily. can decrease klonopin to 0.5mg po tid given recent confusion, delirium. haldol 1mg po/iv q6hr prn agitation. check ekg . hold off doxepin for now.

## 2019-09-30 NOTE — PROGRESS NOTE BEHAVIORAL HEALTH - NSBHCHARTREVIEWVS_PSY_A_CORE FT
Vital Signs Last 24 Hrs  T(C): 37 (30 Sep 2019 07:17), Max: 37 (30 Sep 2019 00:00)  T(F): 98.6 (30 Sep 2019 07:17), Max: 98.6 (30 Sep 2019 00:00)  HR: 95 (30 Sep 2019 07:17) (93 - 117)  BP: 103/71 (30 Sep 2019 07:17) (103/71 - 128/85)  BP(mean): --  RR: 18 (30 Sep 2019 07:17) (18 - 18)  SpO2: 95% (30 Sep 2019 07:17) (94% - 96%)

## 2019-09-30 NOTE — PROGRESS NOTE BEHAVIORAL HEALTH - NSBHFUPINTERVALHXFT_PSY_A_CORE
pt less confused today. Pt denies feeling severely depressed, no current si/hi. pt denies pain. Pt denies psychosis, liz. no agitation. no prns given. as per staff, spoke with Dr. Gomez, pt reported vague HI statement on sat am towards her son, no clear plan. pt was not seen by psych on call this weekend for HI. Pt was continued on 1:1. Pt has been seen by her private psychiatrist Dr. Mcclellan since admission. spoke with family, they were very upset when they heard pt reported HI, they state pt never reported HI in her life, has never been a violent person. no concerns for safety.
pt less confused today. Pt denies feeling severely depressed, no current si/hi. pt denies pain. pt states she was sad because of the "sad box". Pt denies psychosis, liz. no agitation. no prns given.

## 2019-10-01 LAB
ANION GAP SERPL CALC-SCNC: 10 MMOL/L — SIGNIFICANT CHANGE UP (ref 5–17)
BUN SERPL-MCNC: 18 MG/DL — SIGNIFICANT CHANGE UP (ref 7–23)
CALCIUM SERPL-MCNC: 8.6 MG/DL — SIGNIFICANT CHANGE UP (ref 8.4–10.5)
CHLORIDE SERPL-SCNC: 100 MMOL/L — SIGNIFICANT CHANGE UP (ref 96–108)
CO2 SERPL-SCNC: 27 MMOL/L — SIGNIFICANT CHANGE UP (ref 22–31)
CREAT SERPL-MCNC: 0.89 MG/DL — SIGNIFICANT CHANGE UP (ref 0.5–1.3)
GLUCOSE SERPL-MCNC: 151 MG/DL — HIGH (ref 70–99)
HCT VFR BLD CALC: 34.1 % — LOW (ref 34.5–45)
HGB BLD-MCNC: 11.4 G/DL — LOW (ref 11.5–15.5)
MCHC RBC-ENTMCNC: 29.2 PG — SIGNIFICANT CHANGE UP (ref 27–34)
MCHC RBC-ENTMCNC: 33.4 GM/DL — SIGNIFICANT CHANGE UP (ref 32–36)
MCV RBC AUTO: 87.2 FL — SIGNIFICANT CHANGE UP (ref 80–100)
NRBC # BLD: 0 /100 WBCS — SIGNIFICANT CHANGE UP (ref 0–0)
PLATELET # BLD AUTO: 297 K/UL — SIGNIFICANT CHANGE UP (ref 150–400)
POTASSIUM SERPL-MCNC: 3.7 MMOL/L — SIGNIFICANT CHANGE UP (ref 3.5–5.3)
POTASSIUM SERPL-SCNC: 3.7 MMOL/L — SIGNIFICANT CHANGE UP (ref 3.5–5.3)
RBC # BLD: 3.91 M/UL — SIGNIFICANT CHANGE UP (ref 3.8–5.2)
RBC # FLD: 12.4 % — SIGNIFICANT CHANGE UP (ref 10.3–14.5)
SODIUM SERPL-SCNC: 137 MMOL/L — SIGNIFICANT CHANGE UP (ref 135–145)
WBC # BLD: 9.86 K/UL — SIGNIFICANT CHANGE UP (ref 3.8–10.5)
WBC # FLD AUTO: 9.86 K/UL — SIGNIFICANT CHANGE UP (ref 3.8–10.5)

## 2019-10-01 PROCEDURE — 99232 SBSQ HOSP IP/OBS MODERATE 35: CPT

## 2019-10-01 RX ADMIN — Medication 0.5 MILLIGRAM(S): at 05:28

## 2019-10-01 RX ADMIN — CEFTRIAXONE 100 MILLIGRAM(S): 500 INJECTION, POWDER, FOR SOLUTION INTRAMUSCULAR; INTRAVENOUS at 05:28

## 2019-10-01 RX ADMIN — Medication 0.5 MILLIGRAM(S): at 13:11

## 2019-10-01 RX ADMIN — BUPROPION HYDROCHLORIDE 300 MILLIGRAM(S): 150 TABLET, EXTENDED RELEASE ORAL at 05:28

## 2019-10-01 RX ADMIN — POLYETHYLENE GLYCOL 3350 17 GRAM(S): 17 POWDER, FOR SOLUTION ORAL at 05:29

## 2019-10-01 RX ADMIN — ENOXAPARIN SODIUM 40 MILLIGRAM(S): 100 INJECTION SUBCUTANEOUS at 17:14

## 2019-10-01 RX ADMIN — ARIPIPRAZOLE 2 MILLIGRAM(S): 15 TABLET ORAL at 05:28

## 2019-10-01 RX ADMIN — LEVETIRACETAM 500 MILLIGRAM(S): 250 TABLET, FILM COATED ORAL at 05:28

## 2019-10-01 RX ADMIN — PANTOPRAZOLE SODIUM 40 MILLIGRAM(S): 20 TABLET, DELAYED RELEASE ORAL at 05:28

## 2019-10-01 RX ADMIN — Medication 88 MICROGRAM(S): at 05:28

## 2019-10-01 RX ADMIN — Medication 1 MILLIGRAM(S): at 13:11

## 2019-10-01 RX ADMIN — ATORVASTATIN CALCIUM 40 MILLIGRAM(S): 80 TABLET, FILM COATED ORAL at 20:39

## 2019-10-01 RX ADMIN — Medication 100 MILLIGRAM(S): at 05:28

## 2019-10-01 RX ADMIN — Medication 0.5 MILLIGRAM(S): at 20:39

## 2019-10-01 NOTE — PROGRESS NOTE ADULT - ASSESSMENT
65 yo female with PMH of thyroid nodule s/p thyroidectomy, OA, spinal fusion in 2003, brain hemangioma s/p resection in 2003 presented for resection of a large anterior parasagittal meningioma extending through the superior sagittal sinus and calvarium into the subcutaneous soft tissue.     S/p selective cerebral angiography and embolization on 9/23 and craniotomy for meningioma resection on 9/24. On abx for UTI.

## 2019-10-01 NOTE — PROGRESS NOTE ADULT - SUBJECTIVE AND OBJECTIVE BOX
Patient is a 64y old  Female who presents with a chief complaint of meningioma resection (30 Sep 2019 12:13)    HPI: Pt up in chair. Denies complaints.     Vital Signs Last 24 Hrs  T(C): 36.9 (01 Oct 2019 08:12), Max: 37.3 (30 Sep 2019 20:09)  T(F): 98.4 (01 Oct 2019 08:12), Max: 99.2 (30 Sep 2019 20:09)  HR: 91 (01 Oct 2019 08:12) (89 - 102)  BP: 109/74 (01 Oct 2019 08:12) (109/74 - 126/84)  BP(mean): --  RR: 18 (01 Oct 2019 08:12) (18 - 20)  SpO2: 95% (01 Oct 2019 08:12) (94% - 98%)        MEDICATIONS  (STANDING):  ARIPiprazole 2 milliGRAM(s) Oral every 24 hours  atorvastatin 40 milliGRAM(s) Oral at bedtime  buPROPion XL . 300 milliGRAM(s) Oral every 24 hours  cefTRIAXone   IVPB 1000 milliGRAM(s) IV Intermittent every 24 hours  clonazePAM  Tablet 0.5 milliGRAM(s) Oral every 8 hours  docusate sodium 100 milliGRAM(s) Oral three times a day  enoxaparin Injectable 40 milliGRAM(s) SubCutaneous <User Schedule>  folic acid 1 milliGRAM(s) Oral daily  influenza   Vaccine 0.5 milliLiter(s) IntraMuscular once  levothyroxine 88 MICROGram(s) Oral daily  pantoprazole    Tablet 40 milliGRAM(s) Oral before breakfast  polyethylene glycol 3350 17 Gram(s) Oral two times a day  senna 2 Tablet(s) Oral at bedtime    MEDICATIONS  (PRN):  acetaminophen   Tablet .. 650 milliGRAM(s) Oral every 6 hours PRN Mild Pain (1 - 3)

## 2019-10-01 NOTE — PROGRESS NOTE ADULT - SUBJECTIVE AND OBJECTIVE BOX
SUBJECTIVE: Pt seen and examined, doing well, no complaints    OVERNIGHT EVENTS:     Vital Signs Last 24 Hrs  T(C): 36.6 (01 Oct 2019 13:02), Max: 37.3 (30 Sep 2019 20:09)  T(F): 97.8 (01 Oct 2019 13:02), Max: 99.2 (30 Sep 2019 20:09)  HR: 96 (01 Oct 2019 13:02) (89 - 102)  BP: 109/70 (01 Oct 2019 13:02) (109/70 - 126/84)  BP(mean): --  RR: 18 (01 Oct 2019 13:02) (18 - 20)  SpO2: 98% (01 Oct 2019 13:02) (94% - 98%)    PHYSICAL EXAM:    General: No Acute Distress     Neurological: Awake, alert oriented to person, place and time, Following Commands, PERRL, EOMI, Face Symmetrical, Speech Fluent, Moving all extremities, Muscle Strength normal in all four extremities, No Drift, Sensation to Light Touch Intact    Pulmonary: Clear to Auscultation, No Rales, No Rhonchi, No Wheezes     Cardiovascular: S1, S2, Regular Rate and Rhythm     Gastrointestinal: Soft, Nontender, Nondistended     Incision: crani sutures c/d/i    LABS:         Hemoglobin A1C, Whole Blood: 5.5 % (09-23 @ 15:38)      09-30 @ 07:01  -  10-01 @ 07:00  --------------------------------------------------------  IN: 1530 mL / OUT: 0 mL / NET: 1530 mL    10-01 @ 07:01  -  10-01 @ 13:52  --------------------------------------------------------  IN: 260 mL / OUT: 0 mL / NET: 260 mL      DRAINS: none    MEDICATIONS:  Antibiotics:  cefTRIAXone   IVPB 1000 milliGRAM(s) IV Intermittent every 24 hours    Neuro:  acetaminophen   Tablet .. 650 milliGRAM(s) Oral every 6 hours PRN Mild Pain (1 - 3)  ARIPiprazole 2 milliGRAM(s) Oral every 24 hours  buPROPion XL . 300 milliGRAM(s) Oral every 24 hours  clonazePAM  Tablet 0.5 milliGRAM(s) Oral every 8 hours    Cardiac:    Pulm:    GI/:  docusate sodium 100 milliGRAM(s) Oral three times a day  pantoprazole    Tablet 40 milliGRAM(s) Oral before breakfast  polyethylene glycol 3350 17 Gram(s) Oral two times a day  senna 2 Tablet(s) Oral at bedtime    Other:   atorvastatin 40 milliGRAM(s) Oral at bedtime  enoxaparin Injectable 40 milliGRAM(s) SubCutaneous <User Schedule>  folic acid 1 milliGRAM(s) Oral daily  influenza   Vaccine 0.5 milliLiter(s) IntraMuscular once  levothyroxine 88 MICROGram(s) Oral daily    DIET: [x] Regular [] CCD [] Renal [] Puree [] Dysphagia [] Tube Feeds:     IMAGING:   < from: CT Head No Cont (09.25.19 @ 08:11) >    FINDINGS:     Patient is status post by parietal craniotomy with a left frontal   craniectomy and mesh cranioplasty.. Hypodensity and foci of air are   present in the region of the previously identified meningioma. Bifrontal   extra-axial air is postsurgical. High midline hyperdense material is   present, representing minimal hemorrhage versus postsurgical material.    No acute intracranial hemorrhage, mass effect or midline shift.    Ventricles and cortical sulci are within normal limits.       IMPRESSION:     Status post resection of midline frontal meningioma with expected   postoperative changes.    < end of copied text >  < from: VA Duplex Lower Ext Vein Scan, Bilat (09.29.19 @ 11:19) >  FINDINGS:    There is normal compressibility of the bilateral common femoral, femoral   and popliteal veins.     Doppler examination shows normal spontaneous and phasic flow.    No calf vein thrombosis is evident.    IMPRESSION:     No evidence of deep venous thrombosis in either lower extremity.    < end of copied text >  < from: MR Head w/wo IV Cont (09.27.19 @ 21:25) >    Impression:    Postsurgical changes with resection of midline bifrontal meningioma with   anticipated postsurgical change. Trace residual subarachnoid and   extra-axial hemorrhage. Mild restricted diffusion in the bifrontal lobes   may represent postsurgical change versus ischemia.    < end of copied text >

## 2019-10-01 NOTE — PROGRESS NOTE ADULT - PROBLEM SELECTOR PLAN 3
Suspect hypoactive delirium - multifactorial etiology - brain surgery, anesthesia, pain medications, psych medications, steroids (decadron), UTI.     Oxycodone stopped. Klonopin dosing decreased.     Mental status now improved. Suspect hypoactive delirium - multifactorial etiology - brain surgery, anesthesia, pain medications, psych medications, steroids (decadron), UTI.     Mental status now improved.

## 2019-10-01 NOTE — PROGRESS NOTE ADULT - PROBLEM SELECTOR PLAN 5
TSH low n 9/27  - synthroid decreased to 88 mcg daily  - will need next TSH check in 6 weeks TSH low n 9/27, synthroid decreased to 88 mcg daily  - will need next TSH check in 6 weeks

## 2019-10-01 NOTE — PROGRESS NOTE ADULT - ASSESSMENT
64 year old woman with PMH HLD, thyroid nodule s/p thyroidectomy 1999, osteoarthritis s/p spinal fusion in 2003 and gel injections to both knees and brain hemangioma s/p resection in 2003 who noted swelling on top of her head and went to PCP then neurosurgeon now scheduled for cerebral angiography with pipeline stent on 9/23/19 followed by midline craniotomy for tumor resection the following day on 9/24/19.  She denies headaches, dizziness or pain upon palpation.  She is very anxious about the procedure and felt suicidal last week but has been seen by her psychiatrist twice since and will continue regularly scheduled appointments with her.    Per Dr. Mcclellan, her psychiatrist re: letter stating that she is stable for planned procedure on 9/23/19.        PROCEDURE: 9/23 s/p angio/embolization of meningioma, 9/24 s/p Craniotomy for meningioma       PLAN:  Neuro:   - post op MRI shows post op changes, trace residual subarachnoid and extra axial hemorrhage, mild restricted diffusion in bifrontal lobes  - UTI- likely contaminant, > 3 organisms, growing >100K GNR- on ceftriaxone  - Started on IV Ceftriaxone on 9/29. Urine cx non-diagnostic. Complete 5 days of abx. May transition to PO Cipro if discharged.   - depression/anxiety- followed by Psychiatry- continue abilify, wellbutrin and klonopin (dose decreased to 0.5 tid)  - continue keppra for seizure ppx  - completed course of steroids  - hypothyroid- continue synthroid  - bowel regimen  Respiratory:   - encouraged incentive spirometry  CV:  - vitals stable  - HLD- on statin   DVT ppx:   - venodynes, sq lovenox  PT/OT:   Acute rehab, but family wants to take pt home, PT to clear patient to discharge home    Assessment:  Please Check When Present   []  GCS  E   V  M     Heart Failure: []Acute, [] acute on chronic , []chronic  Heart Failure:  [] Diastolic (HFpEF), [] Systolic (HFrEF), []Combined (HFpEF and HFrEF), [] RHF, [] Pulm HTN, [] Other    [] DOMINIC, [] ATN, [] AIN, [] other  [] CKD1, [] CKD2, [] CKD 3, [] CKD 4, [] CKD 5, []ESRD    Encephalopathy: [] Metabolic, [] Hepatic, [] toxic, [] Neurological, [] Other    Abnormal Nurtitional Status: [] malnurtition (see nutrition note), [ ]underweight: BMI < 19, [] morbid obesity: BMI >40, [] Cachexia    [] Sepsis  [] hypovolemic shock,[] cardiogenic shock, [] hemorrhagic shock, [] neuogenic shock  [] Acute Respiratory Failure  []Cerebral edema, [] Brain compression/ herniation,   [] Functional quadriplegia  [] Acute blood loss anemia    Spectralink # [id number]

## 2019-10-02 ENCOUNTER — TRANSCRIPTION ENCOUNTER (OUTPATIENT)
Age: 64
End: 2019-10-02

## 2019-10-02 VITALS — DIASTOLIC BLOOD PRESSURE: 84 MMHG | HEART RATE: 86 BPM | SYSTOLIC BLOOD PRESSURE: 137 MMHG

## 2019-10-02 PROCEDURE — 99232 SBSQ HOSP IP/OBS MODERATE 35: CPT

## 2019-10-02 RX ORDER — SENNA PLUS 8.6 MG/1
2 TABLET ORAL
Qty: 0 | Refills: 0 | DISCHARGE
Start: 2019-10-02

## 2019-10-02 RX ORDER — FOLIC ACID 0.8 MG
1 TABLET ORAL
Qty: 0 | Refills: 0 | DISCHARGE
Start: 2019-10-02

## 2019-10-02 RX ORDER — FOLIC ACID 0.8 MG
1 TABLET ORAL
Qty: 0 | Refills: 0 | DISCHARGE

## 2019-10-02 RX ORDER — LEVOTHYROXINE SODIUM 125 MCG
1 TABLET ORAL
Qty: 0 | Refills: 0 | DISCHARGE

## 2019-10-02 RX ORDER — LEVOTHYROXINE SODIUM 125 MCG
1 TABLET ORAL
Qty: 30 | Refills: 0
Start: 2019-10-02

## 2019-10-02 RX ORDER — DOCUSATE SODIUM 100 MG
1 CAPSULE ORAL
Qty: 0 | Refills: 0 | DISCHARGE
Start: 2019-10-02

## 2019-10-02 RX ORDER — CIPROFLOXACIN LACTATE 400MG/40ML
500 VIAL (ML) INTRAVENOUS EVERY 12 HOURS
Refills: 0 | Status: DISCONTINUED | OUTPATIENT
Start: 2019-10-02 | End: 2019-10-02

## 2019-10-02 RX ORDER — CIPROFLOXACIN LACTATE 400MG/40ML
1 VIAL (ML) INTRAVENOUS
Qty: 0 | Refills: 0 | DISCHARGE
Start: 2019-10-02

## 2019-10-02 RX ORDER — CLONAZEPAM 1 MG
1 TABLET ORAL
Qty: 0 | Refills: 0 | DISCHARGE
Start: 2019-10-02

## 2019-10-02 RX ORDER — ACETAMINOPHEN 500 MG
2 TABLET ORAL
Qty: 0 | Refills: 0 | DISCHARGE
Start: 2019-10-02

## 2019-10-02 RX ORDER — CLONAZEPAM 1 MG
1 TABLET ORAL
Qty: 0 | Refills: 0 | DISCHARGE

## 2019-10-02 RX ADMIN — BUPROPION HYDROCHLORIDE 300 MILLIGRAM(S): 150 TABLET, EXTENDED RELEASE ORAL at 06:01

## 2019-10-02 RX ADMIN — CEFTRIAXONE 100 MILLIGRAM(S): 500 INJECTION, POWDER, FOR SOLUTION INTRAMUSCULAR; INTRAVENOUS at 06:02

## 2019-10-02 RX ADMIN — Medication 0.5 MILLIGRAM(S): at 06:01

## 2019-10-02 RX ADMIN — PANTOPRAZOLE SODIUM 40 MILLIGRAM(S): 20 TABLET, DELAYED RELEASE ORAL at 06:01

## 2019-10-02 RX ADMIN — ARIPIPRAZOLE 2 MILLIGRAM(S): 15 TABLET ORAL at 06:01

## 2019-10-02 RX ADMIN — INFLUENZA VIRUS VACCINE 0.5 MILLILITER(S): 15; 15; 15; 15 SUSPENSION INTRAMUSCULAR at 11:10

## 2019-10-02 RX ADMIN — Medication 88 MICROGRAM(S): at 06:01

## 2019-10-02 RX ADMIN — Medication 1 MILLIGRAM(S): at 11:12

## 2019-10-02 NOTE — DISCHARGE NOTE PROVIDER - NSDCFUADDINST_GEN_ALL_CORE_FT
Return to ER for fever, chills, nausea or vomiting, severe headache or pain not relieved with pain medication, sluggishness or change in mental status, any bleeding or drainage from wound,  or any weakness of extremities.   You may shower and wash your hair on post op day #4. No rubbing or scrubbing of incision. Keep incision clean and dry. Do not apply creams or lotions to incision.   No driving until cleared by your surgeon. Do not return to work until cleared by surgeon.   Do not take advil, aleve or ibuprofen as they can cause bleeding.

## 2019-10-02 NOTE — PROGRESS NOTE ADULT - PROBLEM SELECTOR PLAN 3
Suspect hypoactive delirium - multifactorial etiology - brain surgery, anesthesia, pain medications, psych medications, steroids (decadron), UTI.     Mental status now improved.

## 2019-10-02 NOTE — PROGRESS NOTE ADULT - ATTENDING COMMENTS
Dr. Anastacia Gomez  Division of Hospital Medicine  [facility]   Pager: [phone]
Dr. Anastacia Gomez  Division of Hospital Medicine  [facility]   Pager: [phone]
Dr. Anastacia Gomez  Division of Hospital Medicine  [facility]   Pager: [phone]
Plan for d/c home today w family and aides.

## 2019-10-02 NOTE — DISCHARGE NOTE PROVIDER - NSDCFUADDAPPT_GEN_ALL_CORE_FT
Please make appointment for follow up with 1)Dr Reyes 2) Primary Care Physician to recheck your thyroid level in 6 weeks 3) Dr Mcclellan (Psychiatrist)

## 2019-10-02 NOTE — DISCHARGE NOTE PROVIDER - NSDCACTIVITY_GEN_ALL_CORE
Do not drive or operate machinery/Walking - Indoors allowed/No heavy lifting/straining/Showering allowed/Walking - Outdoors allowed/Stairs allowed

## 2019-10-02 NOTE — PROGRESS NOTE ADULT - ASSESSMENT
64 year old woman with PMH HLD, thyroid nodule s/p thyroidectomy 1999, osteoarthritis s/p spinal fusion in 2003 and gel injections to both knees and brain hemangioma s/p resection in 2003 who noted swelling on top of her head and went to PCP then neurosurgeon now scheduled for cerebral angiography with pipeline stent on 9/23/19 followed by midline craniotomy for tumor resection the following day on 9/24/19.  She denies headaches, dizziness or pain upon palpation.  She is very anxious about the procedure and felt suicidal last week but has been seen by her psychiatrist twice since and will continue regularly scheduled appointments with her.    Per Dr. Mcclellan, her psychiatrist re: letter stating that she is stable for planned procedure on 9/23/19.        PROCEDURE: 9/23 s/p angio/embolization of meningioma, 9/24 s/p Craniotomy for meningioma, hospital course c/b delirium likely from UTI       PLAN:  Neuro:   - post op MRI shows post op changes, trace residual subarachnoid and extra axial hemorrhage, mild restricted diffusion in bifrontal lobes  - UTI- ?  contaminant, > 3 organisms, growing >100K GNR- on ceftriaxone  - Started on IV Ceftriaxone on 9/29. Urine cx non-diagnostic. Complete 5 days of abx. May transition to PO Cipro if discharged.   - depression/anxiety- followed by Psychiatry- continue abilify, wellbutrin and klonopin (dose decreased to 0.5 tid)  - continue keppra for seizure ppx  - completed course of steroids  - hypothyroid- continue synthroid  - bowel regimen  Respiratory:   - encouraged incentive spirometry  CV:  - vitals stable  - HLD- on statin   DVT ppx:   - venodynes, sq lovenox  PT/OT:   Acute rehab, but family wants to take pt home, PT to clear patient to discharge home    Assessment:  Please Check When Present   []  GCS  E   V  M     Heart Failure: []Acute, [] acute on chronic , []chronic  Heart Failure:  [] Diastolic (HFpEF), [] Systolic (HFrEF), []Combined (HFpEF and HFrEF), [] RHF, [] Pulm HTN, [] Other    [] DOMINIC, [] ATN, [] AIN, [] other  [] CKD1, [] CKD2, [] CKD 3, [] CKD 4, [] CKD 5, []ESRD    Encephalopathy: [] Metabolic, [] Hepatic, [] toxic, [] Neurological, [] Other    Abnormal Nurtitional Status: [] malnurtition (see nutrition note), [ ]underweight: BMI < 19, [] morbid obesity: BMI >40, [] Cachexia    [] Sepsis  [] hypovolemic shock,[] cardiogenic shock, [] hemorrhagic shock, [] neuogenic shock  [] Acute Respiratory Failure  []Cerebral edema, [] Brain compression/ herniation,   [] Functional quadriplegia  [] Acute blood loss anemia    Spectralink # [id number]

## 2019-10-02 NOTE — PROGRESS NOTE ADULT - PROBLEM SELECTOR PLAN 6
psych follow up appreciated - psych med titration as per psych team  - currently with 1:1 for previous SI. currently no SI nor HI. did endorse HI, wanting to hurt her son yesterday but no longer having those thoughts today  - f/u psychiatry recs
Lavon f/u.
Psych on board.

## 2019-10-02 NOTE — PROGRESS NOTE ADULT - PROBLEM SELECTOR PLAN 2
s/p surgery   post-op care per neurosurgery
transient episode of tachypnea, tachycardia, and SOB yesterday evening. satting well on RA currently. duplex lower extremities  performed 9/24 neg for DVT  - f/u repeat duplex LE today   - continue to monitor
s/p surgery   - post-op care per neurosurgery  - dexamethasone taper as per neurosurgery  - keppra as per neurosurgery
Resolved. W/u negative.
Resolved. W/u negative.

## 2019-10-02 NOTE — DISCHARGE NOTE PROVIDER - NSDCCPTREATMENT_GEN_ALL_CORE_FT
PRINCIPAL PROCEDURE  Procedure: Craniotomy for meningioma  Findings and Treatment: follow up with Dr Reyes in the office

## 2019-10-02 NOTE — PROGRESS NOTE ADULT - SUBJECTIVE AND OBJECTIVE BOX
SUBJECTIVE: Pt seen and examined, doing well, no complaints, to be discharged home today    OVERNIGHT EVENTS: none    Vital Signs Last 24 Hrs  T(C): 36.5 (02 Oct 2019 08:50), Max: 37.3 (01 Oct 2019 20:25)  T(F): 97.7 (02 Oct 2019 08:50), Max: 99.1 (01 Oct 2019 20:25)  HR: 86 (02 Oct 2019 09:09) (86 - 97)  BP: 137/84 (02 Oct 2019 09:09) (109/70 - 137/84)  BP(mean): --  RR: 18 (02 Oct 2019 08:50) (18 - 18)  SpO2: 94% (02 Oct 2019 08:50) (94% - 98%)    PHYSICAL EXAM:    General: No Acute Distress     Neurological: Awake, alert oriented to person, place and time, Following Commands, PERRL, EOMI, Face Symmetrical, Speech Fluent, Moving all extremities, Muscle Strength normal in all four extremities, No Drift, Sensation to Light Touch Intact    Pulmonary: Clear to Auscultation, No Rales, No Rhonchi, No Wheezes     Cardiovascular: S1, S2, Regular Rate and Rhythm     Gastrointestinal: Soft, Nontender, Nondistended     Incision: crani sutures c/d/i    LABS:                        11.4   9.86  )-----------( 297      ( 01 Oct 2019 14:06 )             34.1    10-01    137  |  100  |  18  ----------------------------<  151<H>  3.7   |  27  |  0.89    Ca    8.6      01 Oct 2019 14:06      Hemoglobin A1C, Whole Blood: 5.5 % (09-23 @ 15:38)      10-01 @ 07:01  -  10-02 @ 07:00  --------------------------------------------------------  IN: 1210 mL / OUT: 0 mL / NET: 1210 mL      DRAINS: none    MEDICATIONS:  Antibiotics:  cefTRIAXone   IVPB 1000 milliGRAM(s) IV Intermittent every 24 hours  ciprofloxacin     Tablet 500 milliGRAM(s) Oral every 12 hours    Neuro:  acetaminophen   Tablet .. 650 milliGRAM(s) Oral every 6 hours PRN Mild Pain (1 - 3)  ARIPiprazole 2 milliGRAM(s) Oral every 24 hours  buPROPion XL . 300 milliGRAM(s) Oral every 24 hours  clonazePAM  Tablet 0.5 milliGRAM(s) Oral every 8 hours    Cardiac:    Pulm:    GI/:  docusate sodium 100 milliGRAM(s) Oral three times a day  polyethylene glycol 3350 17 Gram(s) Oral two times a day  senna 2 Tablet(s) Oral at bedtime    Other:   atorvastatin 40 milliGRAM(s) Oral at bedtime  enoxaparin Injectable 40 milliGRAM(s) SubCutaneous <User Schedule>  folic acid 1 milliGRAM(s) Oral daily  influenza   Vaccine 0.5 milliLiter(s) IntraMuscular once  levothyroxine 88 MICROGram(s) Oral daily    DIET: [x] Regular [] CCD [] Renal [] Puree [] Dysphagia [] Tube Feeds:     IMAGING:   < from: CT Head No Cont (09.25.19 @ 08:11) >    FINDINGS:     Patient is status post by parietal craniotomy with a left frontal   craniectomy and mesh cranioplasty.. Hypodensity and foci of air are   present in the region of the previously identified meningioma. Bifrontal   extra-axial air is postsurgical. High midline hyperdense material is   present, representing minimal hemorrhage versus postsurgical material.    No acute intracranial hemorrhage, mass effect or midline shift.    Ventricles and cortical sulci are within normal limits.       IMPRESSION:     Status post resection of midline frontal meningioma with expected   postoperative changes.    < end of copied text >  < from: VA Duplex Lower Ext Vein Scan, Bilat (09.29.19 @ 11:19) >  FINDINGS:    There is normal compressibility of the bilateral common femoral, femoral   and popliteal veins.     Doppler examination shows normal spontaneous and phasic flow.    No calf vein thrombosis is evident.    IMPRESSION:     No evidence of deep venous thrombosis in either lower extremity.    < end of copied text >  < from: MR Head w/wo IV Cont (09.27.19 @ 21:25) >    Impression:    Postsurgical changes with resection of midline bifrontal meningioma with   anticipated postsurgical change. Trace residual subarachnoid and   extra-axial hemorrhage. Mild restricted diffusion in the bifrontal lobes   may represent postsurgical change versus ischemia.    < end of copied text >

## 2019-10-02 NOTE — DISCHARGE NOTE PROVIDER - CARE PROVIDER_API CALL
Delta Reyes)  Neurological Surgery  [address] [place] [address]  Phone: [phone]  Fax: [phone]  Follow Up Time: 1 week

## 2019-10-02 NOTE — PROGRESS NOTE ADULT - PROBLEM SELECTOR PROBLEM 3
Hypothyroidism, unspecified type
Acute encephalopathy
Hypothyroidism, unspecified type
Acute encephalopathy
Acute encephalopathy

## 2019-10-02 NOTE — PROGRESS NOTE ADULT - PROBLEM SELECTOR PLAN 4
psych follow up appreciated- psych med titration
s/p surgery   - post-op care per neurosurgery  - dexamethasone taper as per neurosurgery  - keppra as per neurosurgery
psych follow up appreciated - psych med titration as per psych team  - currently with 1:1 for previous SI. currently no SI but does endorse HI, wanting to hurt her son  - f/u psychiatry recs  - would maintain 1:1 at this time
s/p surgery as above.
s/p surgery as above.

## 2019-10-02 NOTE — DISCHARGE NOTE PROVIDER - HOSPITAL COURSE
64 year old woman with PMH HLD, thyroid nodule s/p thyroidectomy 1999, osteoarthritis s/p spinal fusion in 2003 and gel injections to both knees and brain hemangioma s/p resection in 2003 who noted swelling on top of her head and went to PCP then neurosurgeon now scheduled for cerebral angiography with pipeline stent on 9/23/19 followed by midline craniotomy for tumor resection the following day on 9/24/19.  She denies headaches, dizziness or pain upon palpation.  She is very anxious about the procedure and felt suicidal last week but has been seen by her psychiatrist twice since and will continue regularly scheduled appointments with her.        Per Dr. Mcclellan, her psychiatrist re: letter stating that she is stable for planned procedure on 9/23/19.                On 9/23/19 pt is s/p cerebral angiogram/embolization of meningioma. On 9/24/19 she is s/p Craniotomy for meningioma resection. Hospital course c/b delirium likely from UTI, required 1:1 constant observation for suicidal ideation. She was followed by Psychiatry. Post op MRI shows post op changes, trace residual subarachnoid and extra axial hemorrhage, mild restricted diffusion in bifrontal lobes. Pt treated for UTI -culture growing gram neg rods, treated with ceftriaxone x 4 days then discharged on cipro x 1 day to complete 5 day course of antibiotics. Patient was evaluated by Physical Therapy and recommended for Acute TBI rehab.  prefers to take her home with 24 hour aide in place. On day of discharge patient is medically and neurologically stable.

## 2019-10-02 NOTE — PROGRESS NOTE ADULT - PROBLEM SELECTOR PROBLEM 1
UTI (urinary tract infection)
Acute encephalopathy
Acute encephalopathy
UTI (urinary tract infection)
UTI (urinary tract infection)

## 2019-10-02 NOTE — DISCHARGE NOTE PROVIDER - NSDCCPCAREPLAN_GEN_ALL_CORE_FT
PRINCIPAL DISCHARGE DIAGNOSIS  Diagnosis: Meningioma  Assessment and Plan of Treatment: 9/23 s/p cerebral angiogram/embolization of meningioma, 9/24 s/p craniotomy for resection of meningioma      SECONDARY DISCHARGE DIAGNOSES  Diagnosis: Hypothyroidism, unspecified type  Assessment and Plan of Treatment: Your levothyroxine dose was changed in the hospital. Your TSH level was low. Recheck your TSH level in 6 weeks with your Primary Care Physician. A prescription was sent to your pharmacy for the new strength of levothyroxine.    Diagnosis: Depression with anxiety  Assessment and Plan of Treatment: continue current medication and follow up with your Psychiatrist    Diagnosis: UTI (urinary tract infection)  Assessment and Plan of Treatment: complete 1 more day of antibiotic (cipro) on Thursday 10/3

## 2019-10-02 NOTE — PROGRESS NOTE ADULT - PROBLEM SELECTOR PROBLEM 5
Constipation, unspecified constipation type
Hypothyroidism, unspecified type
Constipation, unspecified constipation type
Hypothyroidism, unspecified type
Hypothyroidism, unspecified type

## 2019-10-02 NOTE — PROGRESS NOTE ADULT - SUBJECTIVE AND OBJECTIVE BOX
Patient is a 64y old  Female who presents with a chief complaint of meningioma resection (02 Oct 2019 10:13)    HPI: Pt up in chair, feels well. Plan for discharge today. Daughter by bedside.     Vital Signs Last 24 Hrs  T(C): 36.5 (02 Oct 2019 08:50), Max: 37.3 (01 Oct 2019 20:25)  T(F): 97.7 (02 Oct 2019 08:50), Max: 99.1 (01 Oct 2019 20:25)  HR: 86 (02 Oct 2019 09:09) (86 - 97)  BP: 137/84 (02 Oct 2019 09:09) (109/70 - 137/84)  BP(mean): --  RR: 18 (02 Oct 2019 08:50) (18 - 18)  SpO2: 94% (02 Oct 2019 08:50) (94% - 98%)                          11.4   9.86  )-----------( 297      ( 01 Oct 2019 14:06 )             34.1     10-01    137  |  100  |  18  ----------------------------<  151<H>  3.7   |  27  |  0.89    Ca    8.6      01 Oct 2019 14:06      MEDICATIONS  (STANDING):  ARIPiprazole 2 milliGRAM(s) Oral every 24 hours  atorvastatin 40 milliGRAM(s) Oral at bedtime  buPROPion XL . 300 milliGRAM(s) Oral every 24 hours  cefTRIAXone   IVPB 1000 milliGRAM(s) IV Intermittent every 24 hours  ciprofloxacin     Tablet 500 milliGRAM(s) Oral every 12 hours  clonazePAM  Tablet 0.5 milliGRAM(s) Oral every 8 hours  docusate sodium 100 milliGRAM(s) Oral three times a day  enoxaparin Injectable 40 milliGRAM(s) SubCutaneous <User Schedule>  folic acid 1 milliGRAM(s) Oral daily  levothyroxine 88 MICROGram(s) Oral daily  polyethylene glycol 3350 17 Gram(s) Oral two times a day  senna 2 Tablet(s) Oral at bedtime    MEDICATIONS  (PRN):  acetaminophen   Tablet .. 650 milliGRAM(s) Oral every 6 hours PRN Mild Pain (1 - 3)

## 2019-10-02 NOTE — PROGRESS NOTE ADULT - PROVIDER SPECIALTY LIST ADULT
Hospitalist
NSICU
Neurosurgery
Hospitalist
Hospitalist

## 2019-10-02 NOTE — PROGRESS NOTE ADULT - REASON FOR ADMISSION
Meningioma Resection
Recurrent falcine menigioma
resection recurrent falcine menigioma (09/24)
brain lesion
meningioma resection
removal of meningioma

## 2019-10-02 NOTE — PROGRESS NOTE ADULT - PROBLEM SELECTOR PLAN 1
Suspect hypoactive delirium - multifactorial etiology - brain surgery, anesthesia, pain medications, psych medications, steroids (decadron).   Psychiatry consulted.  Oxycodone stopped. Decrease Klonopin dosing.   Try to taper decadron to off as soon as possible.   Supportive care.
UA grossly positive on 9/28  - continue with ceftriaxone 1g q24h  - f/u urine culture
Suspect hypoactive delirium - multifactorial etiology - brain surgery, anesthesia, pain medications, psych medications, steroids (decadron).   - Psychiatry following  - Oxycodone stopped. Klonopin dosing decreased  - Try to taper decadron to off as soon as possible  - Supportive care
Started on IV Ceftriaxone on 9/29. Leukocytosis and mental status improved.     Urine cx w gram neg rods. Complete 5 days of abx. Transition to po Cipro.
Started on IV Ceftriaxone on 9/29. Urine cx non-diagnostic. Complete 5 days of abx. May transition to PO Cipro if discharged.

## 2019-10-07 ENCOUNTER — APPOINTMENT (OUTPATIENT)
Dept: NEUROSURGERY | Facility: CLINIC | Age: 64
End: 2019-10-07
Payer: MEDICARE

## 2019-10-07 VITALS
RESPIRATION RATE: 16 BRPM | OXYGEN SATURATION: 97 % | DIASTOLIC BLOOD PRESSURE: 74 MMHG | HEART RATE: 87 BPM | SYSTOLIC BLOOD PRESSURE: 112 MMHG | TEMPERATURE: 97.8 F

## 2019-10-07 PROCEDURE — 99024 POSTOP FOLLOW-UP VISIT: CPT

## 2019-10-09 NOTE — REASON FOR VISIT
[de-identified] : Craniotomy for resection of left frontal parasagittal recurrent meningioma [de-identified] : 9/24/19 [de-identified] : Patient without complaints today. Mood and affect appropriate. Independent in ADLs. Walking without difficulty. Denies headaches, n/v, dizziness.

## 2019-10-09 NOTE — ASSESSMENT
[FreeTextEntry1] : Discussion:\par - Antibiotic treatment for UTI is completed. Patient asymptomatic.\par - Patient's mood has been excellent since discharge.\par - Reviewed post operative MRI. \par - Pathology reviewed. Meningioma. Official grade is pending. \par - Patient to have original slides from her first craniotomy from [facility] sent to [facility] pathology for review.\par - Patient may drive locally in one week.\par - Patient has been seizure free. She is not currently on an antiepileptic medication.\par - Patient does not need to use a walker. \par - Follow-up OV in 6 weeks.\par - Follow-up MRI +/- contrast in 6 months.

## 2019-10-09 NOTE — PHYSICAL EXAM
[Healing Well] : healing well [Sutures Intact] : closed with intact sutures [No Drainage] : without drainage [Normal Skin] : normal [Erythema] : not erythematous [Tender] : not tender [Normal Skin Turgor] : skin turgor was normal [FreeTextEntry1] : High mid cranial incision [Oriented To Time, Place, And Person] : oriented to person, place, and time [Impaired Insight] : insight and judgment were intact [Affect] : the affect was normal [Person] : oriented to person [Place] : oriented to place [Time] : oriented to time [Short Term Intact] : short term memory intact [Remote Intact] : remote memory intact [Span Intact] : the attention span was normal [Concentration Intact] : normal concentrating ability [Fluency] : fluency intact [Comprehension] : comprehension intact [Current Events] : adequate knowledge of current events [Past History] : adequate knowledge of personal past history [Vocabulary] : adequate range of vocabulary [Cranial Nerves Optic (II)] : visual acuity intact bilaterally,  pupils equal round and reactive to light [Cranial Nerves Oculomotor (III)] : extraocular motion intact [Cranial Nerves Trigeminal (V)] : facial sensation intact symmetrically [Cranial Nerves Facial (VII)] : face symmetrical [Cranial Nerves Glossopharyngeal (IX)] : tongue and palate midline [Cranial Nerves Vestibulocochlear (VIII)] : hearing was intact bilaterally [Cranial Nerves Accessory (XI - Cranial And Spinal)] : head turning and shoulder shrug symmetric [Cranial Nerves Hypoglossal (XII)] : there was no tongue deviation with protrusion [No Muscle Atrophy] : normal bulk in all four extremities [Motor Strength] : muscle strength was normal in all four extremities [Sensation Tactile Decrease] : light touch was intact [Motor Handedness Right-Handed] : the patient is right hand dominant [Romberg's Sign] : Romberg's sign was negtive [Past-pointing] : there was no past-pointing [Balance] : balance was intact [Tremor] : no tremor present [2+] : Patella left 2+ [Sclera] : the sclera and conjunctiva were normal [PERRL With Normal Accommodation] : pupils were equal in size, round, reactive to light, with normal accommodation [Extraocular Movements] : extraocular movements were intact [Outer Ear] : the ears and nose were normal in appearance [Oropharynx] : the oropharynx was normal [Musculoskeletal - Swelling] : no joint swelling seen [Nail Clubbing] : no clubbing  or cyanosis of the fingernails [Abnormal Walk] : normal gait [Motor Tone] : muscle strength and tone were normal [Skin Color & Pigmentation] : normal skin color and pigmentation [Skin Turgor] : normal skin turgor [] : no rash

## 2019-10-18 ENCOUNTER — INBOUND DOCUMENT (OUTPATIENT)
Age: 64
End: 2019-10-18

## 2019-10-24 ENCOUNTER — TRANSCRIPTION ENCOUNTER (OUTPATIENT)
Age: 64
End: 2019-10-24

## 2019-10-25 ENCOUNTER — INPATIENT (INPATIENT)
Facility: HOSPITAL | Age: 64
LOS: 0 days | Discharge: HOME | DRG: 902 | End: 2019-10-26
Attending: NEUROLOGICAL SURGERY | Admitting: NEUROLOGICAL SURGERY
Payer: MEDICARE

## 2019-10-25 VITALS
HEART RATE: 83 BPM | SYSTOLIC BLOOD PRESSURE: 131 MMHG | OXYGEN SATURATION: 95 % | RESPIRATION RATE: 18 BRPM | DIASTOLIC BLOOD PRESSURE: 87 MMHG | WEIGHT: 169.98 LBS | HEIGHT: 64 IN | TEMPERATURE: 98 F

## 2019-10-25 DIAGNOSIS — Z98.890 OTHER SPECIFIED POSTPROCEDURAL STATES: Chronic | ICD-10-CM

## 2019-10-25 DIAGNOSIS — Z98.1 ARTHRODESIS STATUS: Chronic | ICD-10-CM

## 2019-10-25 DIAGNOSIS — G96.0 CEREBROSPINAL FLUID LEAK: ICD-10-CM

## 2019-10-25 LAB
ALBUMIN SERPL ELPH-MCNC: 4.3 G/DL — SIGNIFICANT CHANGE UP (ref 3.3–5)
ALP SERPL-CCNC: 87 U/L — SIGNIFICANT CHANGE UP (ref 40–120)
ALT FLD-CCNC: 18 U/L — SIGNIFICANT CHANGE UP (ref 10–45)
ANION GAP SERPL CALC-SCNC: 8 MMOL/L — SIGNIFICANT CHANGE UP (ref 5–17)
APTT BLD: 29.4 SEC — SIGNIFICANT CHANGE UP (ref 27.5–36.3)
AST SERPL-CCNC: 14 U/L — SIGNIFICANT CHANGE UP (ref 10–40)
BASE EXCESS BLDV CALC-SCNC: 3.6 MMOL/L — HIGH (ref -2–2)
BASOPHILS # BLD AUTO: 0.02 K/UL — SIGNIFICANT CHANGE UP (ref 0–0.2)
BASOPHILS NFR BLD AUTO: 0.5 % — SIGNIFICANT CHANGE UP (ref 0–2)
BILIRUB SERPL-MCNC: 0.3 MG/DL — SIGNIFICANT CHANGE UP (ref 0.2–1.2)
BLD GP AB SCN SERPL QL: NEGATIVE — SIGNIFICANT CHANGE UP
BUN SERPL-MCNC: 21 MG/DL — SIGNIFICANT CHANGE UP (ref 7–23)
CA-I SERPL-SCNC: 1.24 MMOL/L — SIGNIFICANT CHANGE UP (ref 1.12–1.3)
CALCIUM SERPL-MCNC: 9.5 MG/DL — SIGNIFICANT CHANGE UP (ref 8.4–10.5)
CHLORIDE BLDV-SCNC: 111 MMOL/L — HIGH (ref 96–108)
CHLORIDE SERPL-SCNC: 108 MMOL/L — SIGNIFICANT CHANGE UP (ref 96–108)
CO2 BLDV-SCNC: 32 MMOL/L — HIGH (ref 22–30)
CO2 SERPL-SCNC: 29 MMOL/L — SIGNIFICANT CHANGE UP (ref 22–31)
CREAT SERPL-MCNC: 1.12 MG/DL — SIGNIFICANT CHANGE UP (ref 0.5–1.3)
EOSINOPHIL # BLD AUTO: 0.16 K/UL — SIGNIFICANT CHANGE UP (ref 0–0.5)
EOSINOPHIL NFR BLD AUTO: 4.3 % — SIGNIFICANT CHANGE UP (ref 0–6)
GAS PNL BLDV: 142 MMOL/L — SIGNIFICANT CHANGE UP (ref 135–145)
GAS PNL BLDV: SIGNIFICANT CHANGE UP
GLUCOSE BLDV-MCNC: 101 MG/DL — HIGH (ref 70–99)
GLUCOSE SERPL-MCNC: 104 MG/DL — HIGH (ref 70–99)
HCO3 BLDV-SCNC: 30 MMOL/L — HIGH (ref 21–29)
HCT VFR BLD CALC: 39.7 % — SIGNIFICANT CHANGE UP (ref 34.5–45)
HCT VFR BLDA CALC: 40 % — SIGNIFICANT CHANGE UP (ref 39–50)
HGB BLD CALC-MCNC: 12.9 G/DL — SIGNIFICANT CHANGE UP (ref 11.5–15.5)
HGB BLD-MCNC: 12.6 G/DL — SIGNIFICANT CHANGE UP (ref 11.5–15.5)
IMM GRANULOCYTES NFR BLD AUTO: 0.5 % — SIGNIFICANT CHANGE UP (ref 0–1.5)
INR BLD: 1.02 RATIO — SIGNIFICANT CHANGE UP (ref 0.88–1.16)
LACTATE BLDV-MCNC: 1.3 MMOL/L — SIGNIFICANT CHANGE UP (ref 0.7–2)
LYMPHOCYTES # BLD AUTO: 0.98 K/UL — LOW (ref 1–3.3)
LYMPHOCYTES # BLD AUTO: 26.1 % — SIGNIFICANT CHANGE UP (ref 13–44)
MCHC RBC-ENTMCNC: 28.1 PG — SIGNIFICANT CHANGE UP (ref 27–34)
MCHC RBC-ENTMCNC: 31.7 GM/DL — LOW (ref 32–36)
MCV RBC AUTO: 88.6 FL — SIGNIFICANT CHANGE UP (ref 80–100)
MONOCYTES # BLD AUTO: 0.34 K/UL — SIGNIFICANT CHANGE UP (ref 0–0.9)
MONOCYTES NFR BLD AUTO: 9 % — SIGNIFICANT CHANGE UP (ref 2–14)
NEUTROPHILS # BLD AUTO: 2.24 K/UL — SIGNIFICANT CHANGE UP (ref 1.8–7.4)
NEUTROPHILS NFR BLD AUTO: 59.6 % — SIGNIFICANT CHANGE UP (ref 43–77)
NRBC # BLD: 0 /100 WBCS — SIGNIFICANT CHANGE UP (ref 0–0)
OTHER CELLS CSF MANUAL: 6 ML/DL — LOW (ref 18–22)
PCO2 BLDV: 55 MMHG — HIGH (ref 35–50)
PH BLDV: 7.35 — SIGNIFICANT CHANGE UP (ref 7.35–7.45)
PLATELET # BLD AUTO: 229 K/UL — SIGNIFICANT CHANGE UP (ref 150–400)
PO2 BLDV: 22 MMHG — LOW (ref 25–45)
POTASSIUM BLDV-SCNC: 4.1 MMOL/L — SIGNIFICANT CHANGE UP (ref 3.5–5.3)
POTASSIUM SERPL-MCNC: 4.8 MMOL/L — SIGNIFICANT CHANGE UP (ref 3.5–5.3)
POTASSIUM SERPL-SCNC: 4.8 MMOL/L — SIGNIFICANT CHANGE UP (ref 3.5–5.3)
PROT SERPL-MCNC: 6.9 G/DL — SIGNIFICANT CHANGE UP (ref 6–8.3)
PROTHROM AB SERPL-ACNC: 11.6 SEC — SIGNIFICANT CHANGE UP (ref 10–12.9)
RBC # BLD: 4.48 M/UL — SIGNIFICANT CHANGE UP (ref 3.8–5.2)
RBC # FLD: 13.1 % — SIGNIFICANT CHANGE UP (ref 10.3–14.5)
RH IG SCN BLD-IMP: POSITIVE — SIGNIFICANT CHANGE UP
SAO2 % BLDV: 32 % — LOW (ref 67–88)
SODIUM SERPL-SCNC: 145 MMOL/L — SIGNIFICANT CHANGE UP (ref 135–145)
WBC # BLD: 3.76 K/UL — LOW (ref 3.8–10.5)
WBC # FLD AUTO: 3.76 K/UL — LOW (ref 3.8–10.5)

## 2019-10-25 PROCEDURE — 13160 SEC CLSR SURG WND/DEHSN XTN: CPT | Mod: 78

## 2019-10-25 PROCEDURE — 99285 EMERGENCY DEPT VISIT HI MDM: CPT | Mod: GC

## 2019-10-25 PROCEDURE — 70450 CT HEAD/BRAIN W/O DYE: CPT | Mod: 26

## 2019-10-25 PROCEDURE — 14020 TIS TRNFR S/A/L 10 SQ CM/<: CPT

## 2019-10-25 RX ORDER — ACETAMINOPHEN 500 MG
650 TABLET ORAL EVERY 6 HOURS
Refills: 0 | Status: DISCONTINUED | OUTPATIENT
Start: 2019-10-25 | End: 2019-10-25

## 2019-10-25 RX ORDER — BUPROPION HYDROCHLORIDE 150 MG/1
300 TABLET, EXTENDED RELEASE ORAL DAILY
Refills: 0 | Status: DISCONTINUED | OUTPATIENT
Start: 2019-10-25 | End: 2019-10-26

## 2019-10-25 RX ORDER — MUPIROCIN 20 MG/G
1 OINTMENT TOPICAL
Refills: 0 | Status: DISCONTINUED | OUTPATIENT
Start: 2019-10-26 | End: 2019-10-26

## 2019-10-25 RX ORDER — OXYCODONE HYDROCHLORIDE 5 MG/1
5 TABLET ORAL EVERY 6 HOURS
Refills: 0 | Status: DISCONTINUED | OUTPATIENT
Start: 2019-10-25 | End: 2019-10-26

## 2019-10-25 RX ORDER — ATORVASTATIN CALCIUM 80 MG/1
20 TABLET, FILM COATED ORAL AT BEDTIME
Refills: 0 | Status: DISCONTINUED | OUTPATIENT
Start: 2019-10-25 | End: 2019-10-26

## 2019-10-25 RX ORDER — CEFAZOLIN SODIUM 1 G
2000 VIAL (EA) INJECTION ONCE
Refills: 0 | Status: DISCONTINUED | OUTPATIENT
Start: 2019-10-25 | End: 2019-10-25

## 2019-10-25 RX ORDER — ATORVASTATIN CALCIUM 80 MG/1
20 TABLET, FILM COATED ORAL AT BEDTIME
Refills: 0 | Status: DISCONTINUED | OUTPATIENT
Start: 2019-10-25 | End: 2019-10-25

## 2019-10-25 RX ORDER — OXYCODONE HYDROCHLORIDE 5 MG/1
10 TABLET ORAL EVERY 6 HOURS
Refills: 0 | Status: DISCONTINUED | OUTPATIENT
Start: 2019-10-25 | End: 2019-10-26

## 2019-10-25 RX ORDER — OXYCODONE HYDROCHLORIDE 5 MG/1
5 TABLET ORAL EVERY 6 HOURS
Refills: 0 | Status: DISCONTINUED | OUTPATIENT
Start: 2019-10-25 | End: 2019-10-25

## 2019-10-25 RX ORDER — DOXEPIN HCL 100 MG
50 CAPSULE ORAL AT BEDTIME
Refills: 0 | Status: DISCONTINUED | OUTPATIENT
Start: 2019-10-25 | End: 2019-10-26

## 2019-10-25 RX ORDER — FOLIC ACID 0.8 MG
1 TABLET ORAL DAILY
Refills: 0 | Status: DISCONTINUED | OUTPATIENT
Start: 2019-10-25 | End: 2019-10-25

## 2019-10-25 RX ORDER — LEVOTHYROXINE SODIUM 125 MCG
88 TABLET ORAL DAILY
Refills: 0 | Status: DISCONTINUED | OUTPATIENT
Start: 2019-10-25 | End: 2019-10-26

## 2019-10-25 RX ORDER — LEVOTHYROXINE SODIUM 125 MCG
88 TABLET ORAL DAILY
Refills: 0 | Status: DISCONTINUED | OUTPATIENT
Start: 2019-10-25 | End: 2019-10-25

## 2019-10-25 RX ORDER — SODIUM CHLORIDE 9 MG/ML
1000 INJECTION INTRAMUSCULAR; INTRAVENOUS; SUBCUTANEOUS
Refills: 0 | Status: DISCONTINUED | OUTPATIENT
Start: 2019-10-25 | End: 2019-10-26

## 2019-10-25 RX ORDER — CLONAZEPAM 1 MG
0.5 TABLET ORAL EVERY 8 HOURS
Refills: 0 | Status: DISCONTINUED | OUTPATIENT
Start: 2019-10-25 | End: 2019-10-25

## 2019-10-25 RX ORDER — OXYCODONE HYDROCHLORIDE 5 MG/1
10 TABLET ORAL EVERY 6 HOURS
Refills: 0 | Status: DISCONTINUED | OUTPATIENT
Start: 2019-10-25 | End: 2019-10-25

## 2019-10-25 RX ORDER — PANTOPRAZOLE SODIUM 20 MG/1
40 TABLET, DELAYED RELEASE ORAL
Refills: 0 | Status: DISCONTINUED | OUTPATIENT
Start: 2019-10-25 | End: 2019-10-25

## 2019-10-25 RX ORDER — CEFAZOLIN SODIUM 1 G
2000 VIAL (EA) INJECTION EVERY 8 HOURS
Refills: 0 | Status: DISCONTINUED | OUTPATIENT
Start: 2019-10-25 | End: 2019-10-25

## 2019-10-25 RX ORDER — FOLIC ACID 0.8 MG
1 TABLET ORAL DAILY
Refills: 0 | Status: DISCONTINUED | OUTPATIENT
Start: 2019-10-25 | End: 2019-10-26

## 2019-10-25 RX ORDER — SENNA PLUS 8.6 MG/1
2 TABLET ORAL AT BEDTIME
Refills: 0 | Status: DISCONTINUED | OUTPATIENT
Start: 2019-10-25 | End: 2019-10-25

## 2019-10-25 RX ORDER — CLONAZEPAM 1 MG
0.5 TABLET ORAL EVERY 8 HOURS
Refills: 0 | Status: DISCONTINUED | OUTPATIENT
Start: 2019-10-25 | End: 2019-10-26

## 2019-10-25 RX ORDER — VANCOMYCIN HCL 1 G
1000 VIAL (EA) INTRAVENOUS ONCE
Refills: 0 | Status: COMPLETED | OUTPATIENT
Start: 2019-10-26 | End: 2019-10-26

## 2019-10-25 RX ORDER — CEFAZOLIN SODIUM 1 G
VIAL (EA) INJECTION
Refills: 0 | Status: DISCONTINUED | OUTPATIENT
Start: 2019-10-25 | End: 2019-10-25

## 2019-10-25 RX ORDER — ONDANSETRON 8 MG/1
4 TABLET, FILM COATED ORAL
Refills: 0 | Status: DISCONTINUED | OUTPATIENT
Start: 2019-10-25 | End: 2019-10-26

## 2019-10-25 RX ORDER — ARIPIPRAZOLE 15 MG/1
2 TABLET ORAL DAILY
Refills: 0 | Status: DISCONTINUED | OUTPATIENT
Start: 2019-10-25 | End: 2019-10-25

## 2019-10-25 RX ORDER — HYDROMORPHONE HYDROCHLORIDE 2 MG/ML
0.5 INJECTION INTRAMUSCULAR; INTRAVENOUS; SUBCUTANEOUS
Refills: 0 | Status: DISCONTINUED | OUTPATIENT
Start: 2019-10-25 | End: 2019-10-26

## 2019-10-25 RX ORDER — SENNA PLUS 8.6 MG/1
2 TABLET ORAL AT BEDTIME
Refills: 0 | Status: DISCONTINUED | OUTPATIENT
Start: 2019-10-25 | End: 2019-10-26

## 2019-10-25 RX ORDER — BUPROPION HYDROCHLORIDE 150 MG/1
300 TABLET, EXTENDED RELEASE ORAL DAILY
Refills: 0 | Status: DISCONTINUED | OUTPATIENT
Start: 2019-10-25 | End: 2019-10-25

## 2019-10-25 RX ORDER — ARIPIPRAZOLE 15 MG/1
2 TABLET ORAL DAILY
Refills: 0 | Status: DISCONTINUED | OUTPATIENT
Start: 2019-10-25 | End: 2019-10-26

## 2019-10-25 RX ADMIN — HYDROMORPHONE HYDROCHLORIDE 0.5 MILLIGRAM(S): 2 INJECTION INTRAMUSCULAR; INTRAVENOUS; SUBCUTANEOUS at 22:40

## 2019-10-25 RX ADMIN — HYDROMORPHONE HYDROCHLORIDE 0.5 MILLIGRAM(S): 2 INJECTION INTRAMUSCULAR; INTRAVENOUS; SUBCUTANEOUS at 23:10

## 2019-10-25 RX ADMIN — Medication 0.5 MILLIGRAM(S): at 22:52

## 2019-10-25 RX ADMIN — ATORVASTATIN CALCIUM 20 MILLIGRAM(S): 80 TABLET, FILM COATED ORAL at 22:52

## 2019-10-25 RX ADMIN — SODIUM CHLORIDE 75 MILLILITER(S): 9 INJECTION INTRAMUSCULAR; INTRAVENOUS; SUBCUTANEOUS at 22:01

## 2019-10-25 NOTE — ED ADULT NURSE NOTE - CHPI ED NUR SYMPTOMS NEG
no dizziness/no fever/no nausea/no decreased eating/drinking/no chills/no pain/no vomiting/no weakness/no tingling

## 2019-10-25 NOTE — ED PROVIDER NOTE - CLINICAL SUMMARY MEDICAL DECISION MAKING FREE TEXT BOX
ATTG: : recent surgery with drainage from scalp suspected, concern for infection / CSF leak. will check ct head, check labs, neurosurgeon eval. reeval for dispo

## 2019-10-25 NOTE — CONSULT NOTE ADULT - SUBJECTIVE AND OBJECTIVE BOX
CC: 64y old Female admitted with a chief complaint of fluid drainage from craniotomy wound.    HPI: 63 yo F PMH HLD, thyroid nodule s/p thyroidectomy (), osteoarthritis s/p spinal fusion () and injections to b/l knees, brain hemangioma s/p resection (), and meningioma s/p cerebral angiography with stent 19 and craniotomy with tumor resection 19. Recently discharged from [facility] on 10/2/19; previous hospital course c/b delirium likely from UTI. She states she has been doing well since being discharged from the hospital however this morning when she awoke she noted that her pillow was wet from drainage from her craniotomy wound site. She presented to [facility] ED this morning. She denies fevers, chills, headaches, dizziness or pain upon palpation.      PMHx: HLD (hyperlipidemia)  Thyroid nodule  OA (osteoarthritis)  Depression with anxiety    PSHx: S/P resection of meningioma  S/P thyroidectomy  S/P lumbar spinal fusion    Medications (inpatient): ARIPiprazole 2 milliGRAM(s) Oral daily  atorvastatin 20 milliGRAM(s) Oral at bedtime  buPROPion XL . 300 milliGRAM(s) Oral daily  ceFAZolin   IVPB      ceFAZolin   IVPB 2000 milliGRAM(s) IV Intermittent once  ceFAZolin   IVPB 2000 milliGRAM(s) IV Intermittent every 8 hours  clonazePAM  Tablet 0.5 milliGRAM(s) Oral every 8 hours  folic acid 1 milliGRAM(s) Oral daily  levothyroxine 88 MICROGram(s) Oral daily  multivitamin 1 Tablet(s) Oral daily  pantoprazole    Tablet 40 milliGRAM(s) Oral before breakfast    Medications (PRN):acetaminophen   Tablet .. 650 milliGRAM(s) Oral every 6 hours PRN  bisacodyl 5 milliGRAM(s) Oral daily PRN  oxyCODONE    IR 5 milliGRAM(s) Oral every 6 hours PRN  oxyCODONE    IR 10 milliGRAM(s) Oral every 6 hours PRN  senna 2 Tablet(s) Oral at bedtime PRN    Allergies: penicillin (Hives)  (Intolerances: )  Social Hx: Denies tobacco, EtOH socially.  Family Hx: FH: lung cancer: Mother  in her 50s  FH: premature coronary heart disease: Father  at 55 from MI      Physical Exam  T(C): 36.7  HR: 86 (69 - 86)  BP: 130/84 (124/85 - 131/87)  RR: 20 (18 - 20)  SpO2: 97% (95% - 98%)  Tmax: T(C): , Max: 36.7 (10-25- @ 15:53)    General: well developed, well nourished, NAD  Neuro: alert and oriented, no focal deficits, moves all extremities spontaneously  HEENT: head wrapped in gauze c/d/i, EOMI, anicteric, mucosa moist  Respiratory: airway patent, respirations unlabored  CVS: regular rate and rhythm  Extremities: no edema, sensation and movement grossly intact  Skin: warm, dry, appropriate color    Labs:                        12.6   3.76  )-----------( 229      ( 25 Oct 2019 10:23 )             39.7     PT/INR - ( 25 Oct 2019 16:55 )   PT: 11.6 sec;   INR: 1.02 ratio         PTT - ( 25 Oct 2019 16:55 )  PTT:29.4 sec  10-25    145  |  108  |  21  ----------------------------<  104<H>  4.8   |  29  |  1.12    Ca    9.5      25 Oct 2019 10:23    TPro  6.9  /  Alb  4.3  /  TBili  0.3  /  DBili  x   /  AST  14  /  ALT  18  /  AlkPhos  87  10-25            Imaging and other studies: CC: 64y old Female admitted with a chief complaint of fluid drainage from craniotomy wound.    HPI: 65 yo F PMH HLD, thyroid nodule s/p thyroidectomy (), osteoarthritis s/p spinal fusion () and injections to b/l knees, brain hemangioma s/p resection (), and meningioma s/p cerebral angiography with stent 19 and craniotomy with tumor resection 19. Recently discharged from [facility] on 10/2/19; previous hospital course c/b delirium likely from UTI. She states she has been doing well since being discharged from the hospital however this morning when she awoke she noted that her pillow was wet from drainage from her craniotomy wound site. She denies fevers, chills, headaches, dizziness or pain upon palpation.      PMHx: HLD (hyperlipidemia)  Thyroid nodule  OA (osteoarthritis)  Depression with anxiety    PSHx: S/P resection of meningioma  S/P thyroidectomy  S/P lumbar spinal fusion    Medications (inpatient): ARIPiprazole 2 milliGRAM(s) Oral daily  atorvastatin 20 milliGRAM(s) Oral at bedtime  buPROPion XL . 300 milliGRAM(s) Oral daily  ceFAZolin   IVPB      ceFAZolin   IVPB 2000 milliGRAM(s) IV Intermittent once  ceFAZolin   IVPB 2000 milliGRAM(s) IV Intermittent every 8 hours  clonazePAM  Tablet 0.5 milliGRAM(s) Oral every 8 hours  folic acid 1 milliGRAM(s) Oral daily  levothyroxine 88 MICROGram(s) Oral daily  multivitamin 1 Tablet(s) Oral daily  pantoprazole    Tablet 40 milliGRAM(s) Oral before breakfast    Medications (PRN):acetaminophen   Tablet .. 650 milliGRAM(s) Oral every 6 hours PRN  bisacodyl 5 milliGRAM(s) Oral daily PRN  oxyCODONE    IR 5 milliGRAM(s) Oral every 6 hours PRN  oxyCODONE    IR 10 milliGRAM(s) Oral every 6 hours PRN  senna 2 Tablet(s) Oral at bedtime PRN    Allergies: penicillin (Hives)  (Intolerances: )  Social Hx: Denies tobacco, EtOH socially.  Family Hx: FH: lung cancer: Mother  in her 50s  FH: premature coronary heart disease: Father  at 55 from MI      Physical Exam  T(C): 36.7  HR: 86 (69 - 86)  BP: 130/84 (124/85 - 131/87)  RR: 20 (18 - 20)  SpO2: 97% (95% - 98%)  Tmax: T(C): , Max: 36.7 (10-25-19 @ 15:53)    General: well developed, well nourished, NAD  Neuro: alert and oriented, no focal deficits, moves all extremities spontaneously  HEENT: head wrapped in gauze c/d/i, EOMI, anicteric, mucosa moist  Respiratory: airway patent, respirations unlabored  CVS: regular rate and rhythm  Extremities: no edema, sensation and movement grossly intact  Skin: warm, dry, appropriate color    Labs:                        12.6   3.76  )-----------( 229      ( 25 Oct 2019 10:23 )             39.7     PT/INR - ( 25 Oct 2019 16:55 )   PT: 11.6 sec;   INR: 1.02 ratio         PTT - ( 25 Oct 2019 16:55 )  PTT:29.4 sec  10-25    145  |  108  |  21  ----------------------------<  104<H>  4.8   |  29  |  1.12    Ca    9.5      25 Oct 2019 10:23    TPro  6.9  /  Alb  4.3  /  TBili  0.3  /  DBili  x   /  AST  14  /  ALT  18  /  AlkPhos  87  10-25            Imaging and other studies:    < from: CT Head No Cont (10.25.19 @ 10:07) >  EXAM:  CT BRAIN                            PROCEDURE DATE:  10/25/2019            INTERPRETATION:    CLINICAL INDICATION: Craniotomy for normal removal with drainage from   scalp    5mm axial sections of the brain were obtained from base to vertex,  without the intravenous administration of contrast material. Coronal and   sagittal computer generated reconstructed available.    Comparison is made with the prior CT of 2019.    There has been a previous midline frontal craniotomy and partial   craniectomy with mesh cranioplasty. The postoperative air beneath the   frontal calvarium has significantly resolved. Lucency in the midline   anteriorly in the frontal lobe is consistent with postoperative changes.   No collections are identified.    The ventricles are normal in size and position. Embolic material is   identified adjacent to the right sphenoid wing consistent with embolic   material in the right middle meningeal artery.      IMPRESSION: Midline frontal craniectomy and mesh cranioplasty with   underlying postoperative changes. Significant resolution of postoperative   air since 2019. No collections.                    JOSE ZHANG M.D., ATTENDING RADIOLOGIST  This document has been electronically signed. Oct 25 2019 10:19AM    < end of copied text >

## 2019-10-25 NOTE — H&P ADULT - HISTORY OF PRESENT ILLNESS
65 yo HLD, thyroid nodule s/p thyroidectomy 1999, osteoarthritis s/p spinal fusion in 2003 and gel injections to both knees and brain hemangioma, s/p resection in 2003, now s/p cerebral angiogram/embolization of meningioma (9/23/19) by Dr. Reyes, hospital course c/b delirium likely from UTI, discharged 10/2, who presentes to the ED for finding leaking from drainage tube today. No fever, or neck or headache. No nauseous or vomiting. No vision change, no difficulty walking, tingling or numnbess.

## 2019-10-25 NOTE — H&P ADULT - ASSESSMENT
Macey Fajardo  64F presents s/p meningioma resection X2 last in september by Dr. Reyes presents to the ED today with clear drainage from wound early this morning.  Denies fevers, prior episodes. On exam neurologically intact. Incision non erythematous. On further assessment has an area of dehiscence in the midline. CTH without hydrocephalus or any collection   - Admit to neurosurgery  - Plan for OR today with Plastics closure   - Ancef 2q8

## 2019-10-25 NOTE — ED ADULT NURSE NOTE - OBJECTIVE STATEMENT
64 yrs old female with h/o meningioma and had craniotomy done on 09/24/2019 ,present to the ER for leaking of unknown fluid from the craniotomy site. As per pt she woke up this morning with wetness on her pillow . Pt denies complain of headache, fever/ chills.

## 2019-10-25 NOTE — PROGRESS NOTE ADULT - SUBJECTIVE AND OBJECTIVE BOX
Patient seen and examined at bedside.    --Anticoagulation--    T(C): 36.7 (10-25-19 @ 21:30), Max: 36.7 (10-25-19 @ 15:53)  HR: 81 (10-25-19 @ 23:00) (69 - 103)  BP: 98/54 (10-25-19 @ 23:00) (98/54 - 132/60)  RR: 16 (10-25-19 @ 23:00) (15 - 20)  SpO2: 99% (10-25-19 @ 23:00) (90% - 100%)  Wt(kg): --    Exam:  very flat affect  AOx3, FC, EOMI, no facial   5/5 throughout, no drift  SILT

## 2019-10-25 NOTE — CONSULT NOTE ADULT - ASSESSMENT
63 yo F PMH HLD, thyroid nodule s/p thyroidectomy (1999), osteoarthritis s/p spinal fusion (2003) and injections to b/l knees, brain hemangioma s/p resection (2003), and meningioma s/p cerebral angiography with stent 9/23/19 and craniotomy with tumor resection 9/24/19. Recently discharged from [facility] on 10/2/19; previous hospital course c/b delirium likely from UTI. Now presenting with drainage from craniotomy wound site. Neurosurgery with plan for OR today. Plastics surgery consulted for cranial closure.    - OR today with Neurosurgery, Plastics closure  - Rest of care per Neurosurgery team    Plastic Surgery [id number] 65 yo F PMH HLD, thyroid nodule s/p thyroidectomy (1999), osteoarthritis s/p spinal fusion (2003) and injections to b/l knees, brain hemangioma s/p resection (2003), and meningioma s/p cerebral angiography with stent 9/23/19 and craniotomy with tumor resection 9/24/19. Now presenting with drainage from craniotomy wound site. No palpable collections. No collections on CT. Neurosurgery with plan for OR today. Plastics surgery consulted for cranial closure.    - OR today with Neurosurgery, Plastics closure  - Rest of care per Neurosurgery team    Plastic Surgery [id number] 65 yo F PMH HLD, thyroid nodule s/p thyroidectomy (1999), osteoarthritis s/p spinal fusion (2003) and injections to b/l knees, brain hemangioma s/p resection (2003), and meningioma s/p cerebral angiography with stent 9/23/19 and craniotomy with tumor resection 9/24/19. Now presenting with drainage from craniotomy wound site. No collections on CT. Neurosurgery with plan for OR today. Plastics surgery consulted for cranial closure.    - OR today with Neurosurgery, Plastics closure  - Rest of care per Neurosurgery team    Plastic Surgery [phone]

## 2019-10-25 NOTE — ED PROVIDER NOTE - OBJECTIVE STATEMENT
[id number]
PGY2/MD Geremias. 65 yo HLD, thyroid nodule s/p thyroidectomy 1999, osteoarthritis s/p spinal fusion in 2003 and gel injections to both knees and brain hemangioma, s/p resection in 2003, now s/p cerebral angiogram/embolization of meningioma (9/23/19) by Dr. Reyes, hospital course c/b delirium likely from UTI, discharged 10/2, who presentes to the ED for finding leaking from drainage tube today. No fever, or neck or headache. No nauseous or vomiting. No vision change, no difficulty walking, tingling or numnbess.

## 2019-10-25 NOTE — ED PROVIDER NOTE - PROGRESS NOTE DETAILS
PGY2/MD Geremias. Neuro surgeon is at the bedside. PGY2/MD Geremias. accepted by Dr. Reyes, admission

## 2019-10-26 ENCOUNTER — TRANSCRIPTION ENCOUNTER (OUTPATIENT)
Age: 64
End: 2019-10-26

## 2019-10-26 ENCOUNTER — MOBILE ON CALL (OUTPATIENT)
Age: 64
End: 2019-10-26

## 2019-10-26 VITALS
HEART RATE: 92 BPM | SYSTOLIC BLOOD PRESSURE: 104 MMHG | OXYGEN SATURATION: 96 % | DIASTOLIC BLOOD PRESSURE: 66 MMHG | RESPIRATION RATE: 14 BRPM

## 2019-10-26 LAB
ANION GAP SERPL CALC-SCNC: 16 MMOL/L — SIGNIFICANT CHANGE UP (ref 5–17)
APPEARANCE UR: CLEAR — SIGNIFICANT CHANGE UP
BILIRUB UR-MCNC: NEGATIVE — SIGNIFICANT CHANGE UP
CHLORIDE SERPL-SCNC: 105 MMOL/L — SIGNIFICANT CHANGE UP (ref 96–108)
CO2 SERPL-SCNC: 23 MMOL/L — SIGNIFICANT CHANGE UP (ref 22–31)
COLOR SPEC: SIGNIFICANT CHANGE UP
DIFF PNL FLD: NEGATIVE — SIGNIFICANT CHANGE UP
GLUCOSE UR QL: NEGATIVE — SIGNIFICANT CHANGE UP
HCT VFR BLD CALC: 38.4 % — SIGNIFICANT CHANGE UP (ref 34.5–45)
HCV AB S/CO SERPL IA: 0.09 S/CO — SIGNIFICANT CHANGE UP (ref 0–0.99)
HCV AB SERPL-IMP: SIGNIFICANT CHANGE UP
HGB BLD-MCNC: 12 G/DL — SIGNIFICANT CHANGE UP (ref 11.5–15.5)
KETONES UR-MCNC: NEGATIVE — SIGNIFICANT CHANGE UP
LEUKOCYTE ESTERASE UR-ACNC: ABNORMAL
MCHC RBC-ENTMCNC: 27.9 PG — SIGNIFICANT CHANGE UP (ref 27–34)
MCHC RBC-ENTMCNC: 31.3 GM/DL — LOW (ref 32–36)
MCV RBC AUTO: 89.3 FL — SIGNIFICANT CHANGE UP (ref 80–100)
NITRITE UR-MCNC: NEGATIVE — SIGNIFICANT CHANGE UP
NRBC # BLD: 0 /100 WBCS — SIGNIFICANT CHANGE UP (ref 0–0)
PH UR: 6 — SIGNIFICANT CHANGE UP (ref 5–8)
PLATELET # BLD AUTO: 216 K/UL — SIGNIFICANT CHANGE UP (ref 150–400)
POTASSIUM SERPL-MCNC: 4.6 MMOL/L — SIGNIFICANT CHANGE UP (ref 3.5–5.3)
POTASSIUM SERPL-SCNC: 4.6 MMOL/L — SIGNIFICANT CHANGE UP (ref 3.5–5.3)
PROT UR-MCNC: NEGATIVE — SIGNIFICANT CHANGE UP
RBC # BLD: 4.3 M/UL — SIGNIFICANT CHANGE UP (ref 3.8–5.2)
RBC # FLD: 13.2 % — SIGNIFICANT CHANGE UP (ref 10.3–14.5)
SODIUM SERPL-SCNC: 144 MMOL/L — SIGNIFICANT CHANGE UP (ref 135–145)
SP GR SPEC: 1.01 — SIGNIFICANT CHANGE UP (ref 1.01–1.02)
UROBILINOGEN FLD QL: NEGATIVE — SIGNIFICANT CHANGE UP
WBC # BLD: 8.86 K/UL — SIGNIFICANT CHANGE UP (ref 3.8–10.5)
WBC # FLD AUTO: 8.86 K/UL — SIGNIFICANT CHANGE UP (ref 3.8–10.5)

## 2019-10-26 RX ORDER — SODIUM CHLORIDE 9 MG/ML
500 INJECTION INTRAMUSCULAR; INTRAVENOUS; SUBCUTANEOUS ONCE
Refills: 0 | Status: COMPLETED | OUTPATIENT
Start: 2019-10-26 | End: 2019-10-26

## 2019-10-26 RX ORDER — MUPIROCIN 20 MG/G
1 OINTMENT TOPICAL
Qty: 22 | Refills: 0
Start: 2019-10-26 | End: 2019-11-08

## 2019-10-26 RX ADMIN — Medication 88 MICROGRAM(S): at 06:37

## 2019-10-26 RX ADMIN — SODIUM CHLORIDE 500 MILLILITER(S): 9 INJECTION INTRAMUSCULAR; INTRAVENOUS; SUBCUTANEOUS at 07:29

## 2019-10-26 RX ADMIN — Medication 250 MILLIGRAM(S): at 06:38

## 2019-10-26 RX ADMIN — Medication 0.5 MILLIGRAM(S): at 06:37

## 2019-10-26 RX ADMIN — Medication 1 TABLET(S): at 06:38

## 2019-10-26 RX ADMIN — MUPIROCIN 1 APPLICATION(S): 20 OINTMENT TOPICAL at 06:38

## 2019-10-26 NOTE — DISCHARGE NOTE PROVIDER - CARE PROVIDER_API CALL
Jose Banks)  Plastic Surgery  [address], [address]  [place] [address]  Phone: [phone]  Fax: [phone]  Follow Up Time:     Delta Reyes)  Neurological Surgery  [address] [place] [address]  Phone: [phone]  Fax: [phone]  Follow Up Time:

## 2019-10-26 NOTE — DISCHARGE NOTE NURSING/CASE MANAGEMENT/SOCIAL WORK - PATIENT PORTAL LINK FT
You can access the FollowMyHealth Patient Portal offered by [facility] by registering at the following website: [email or web address]. By joining [facility]’s FollowMyHealth portal, you will also be able to view your health information using other applications (apps) compatible with our system.

## 2019-10-26 NOTE — DISCHARGE NOTE PROVIDER - NSDCFUADDAPPT_GEN_ALL_CORE_FT
Please see Dr. [place] Tuesday in his clinic. Call on Monday to make an appointment.     Please se Dr. Reyes in 10-14 days post operatively

## 2019-10-26 NOTE — PROGRESS NOTE ADULT - SUBJECTIVE AND OBJECTIVE BOX
MEGHA CUMMINS  [id number]    Subjective:    Patient seen and examined, sitting up in chair in PACU, eating breakfast.   Reports a headache with improvement of Tylenol. No other complaints, awaiting [facility] for DC home.        Objective:  T(C): 36.4 (10-26-19 @ 04:00), Max: 36.7 (10-25-19 @ 15:53)  HR: 91 (10-26-19 @ 08:00) (69 - 103)  BP: 116/66 (10-26-19 @ 08:00) (98/54 - 132/60)  RR: 14 (10-26-19 @ 08:00) (14 - 20)  SpO2: 100% (10-26-19 @ 08:00) (90% - 100%)      144  |  105  |  x   ----------------------------<  x   4.6   |  23  |  x     Ca    9.5      25 Oct 2019 10:23    TPro  6.9  /  Alb  4.3  /  TBili  0.3  /  DBili  x   /  AST  14  /  ALT  18  /  AlkPhos  87  10-25                        12.0   8.86  )-----------( 216      ( 26 Oct 2019 03:06 )             38.4       10-25 @ 07:01  -  10-26 @ 07:00  --------------------------------------------------------  IN: 1225 mL / OUT: 0 mL / NET: 1225 mL    10-26 @ 07:01  -  10-26 @ 08:32  --------------------------------------------------------  IN: 200 mL / OUT: 0 mL / NET: 200 mL      PHYSICAL EXAM:    General: NAD   HEENT: Incision CDI with xeroform overlying incision. No palpable collections, no drainage.             MEDICATIONS  (STANDING):  ARIPiprazole 2 milliGRAM(s) Oral daily  atorvastatin 20 milliGRAM(s) Oral at bedtime  buPROPion XL . 300 milliGRAM(s) Oral daily  clonazePAM  Tablet 0.5 milliGRAM(s) Oral every 8 hours  doxepin 50 milliGRAM(s) Oral at bedtime  folic acid 1 milliGRAM(s) Oral daily  levothyroxine 88 MICROGram(s) Oral daily  multivitamin 1 Tablet(s) Oral daily  mupirocin 2% Ointment 1 Application(s) Topical two times a day  sodium chloride 0.9%. 1000 milliLiter(s) (75 mL/Hr) IV Continuous <Continuous>  trimethoprim  160 mG/sulfamethoxazole 800 mG 1 Tablet(s) Oral two times a day    MEDICATIONS  (PRN):  bisacodyl 5 milliGRAM(s) Oral daily PRN Constipation  HYDROmorphone  Injectable 0.5 milliGRAM(s) IV Push every 10 minutes PRN Moderate Pain (4 - 6)  ondansetron Injectable 4 milliGRAM(s) IV Push every 30 minutes PRN Nausea and/or Vomiting  oxyCODONE    IR 5 milliGRAM(s) Oral every 6 hours PRN Moderate Pain (4 - 6)  oxyCODONE    IR 10 milliGRAM(s) Oral every 6 hours PRN Severe Pain (7 - 10)  senna 2 Tablet(s) Oral at bedtime PRN Constipation      Assessment/Plan:  Patient is a 64y old F POD 1 s/p scalp wound washout and debridement with local scalp flap closure, doing well:     - [place] home per NSx   - Wound care instructions for discharge: May remove Xeroform from incision sites, apply mupirocin BID. Keep scalp dry until follow up with Dr. [place] (10/29)   - Abx per Nsx     Plastic Surgery MEGHA CUMMINS  [id number]    Subjective:    Patient seen and examined, sitting up in chair in PACU, eating breakfast.   Reports a headache with improvement of Tylenol. No other complaints, awaiting [facility] for DC home.        Objective:  T(C): 36.4 (10-26-19 @ 04:00), Max: 36.7 (10-25-19 @ 15:53)  HR: 91 (10-26-19 @ 08:00) (69 - 103)  BP: 116/66 (10-26-19 @ 08:00) (98/54 - 132/60)  RR: 14 (10-26-19 @ 08:00) (14 - 20)  SpO2: 100% (10-26-19 @ 08:00) (90% - 100%)      144  |  105  |  x   ----------------------------<  x   4.6   |  23  |  x     Ca    9.5      25 Oct 2019 10:23    TPro  6.9  /  Alb  4.3  /  TBili  0.3  /  DBili  x   /  AST  14  /  ALT  18  /  AlkPhos  87  10-25                        12.0   8.86  )-----------( 216      ( 26 Oct 2019 03:06 )             38.4       10-25 @ 07:01  -  10-26 @ 07:00  --------------------------------------------------------  IN: 1225 mL / OUT: 0 mL / NET: 1225 mL    10-26 @ 07:01  -  10-26 @ 08:32  --------------------------------------------------------  IN: 200 mL / OUT: 0 mL / NET: 200 mL      PHYSICAL EXAM:    General: NAD   HEENT: Incision CDI with xeroform overlying incision. No palpable collections, no drainage.             MEDICATIONS  (STANDING):  ARIPiprazole 2 milliGRAM(s) Oral daily  atorvastatin 20 milliGRAM(s) Oral at bedtime  buPROPion XL . 300 milliGRAM(s) Oral daily  clonazePAM  Tablet 0.5 milliGRAM(s) Oral every 8 hours  doxepin 50 milliGRAM(s) Oral at bedtime  folic acid 1 milliGRAM(s) Oral daily  levothyroxine 88 MICROGram(s) Oral daily  multivitamin 1 Tablet(s) Oral daily  mupirocin 2% Ointment 1 Application(s) Topical two times a day  sodium chloride 0.9%. 1000 milliLiter(s) (75 mL/Hr) IV Continuous <Continuous>  trimethoprim  160 mG/sulfamethoxazole 800 mG 1 Tablet(s) Oral two times a day    MEDICATIONS  (PRN):  bisacodyl 5 milliGRAM(s) Oral daily PRN Constipation  HYDROmorphone  Injectable 0.5 milliGRAM(s) IV Push every 10 minutes PRN Moderate Pain (4 - 6)  ondansetron Injectable 4 milliGRAM(s) IV Push every 30 minutes PRN Nausea and/or Vomiting  oxyCODONE    IR 5 milliGRAM(s) Oral every 6 hours PRN Moderate Pain (4 - 6)  oxyCODONE    IR 10 milliGRAM(s) Oral every 6 hours PRN Severe Pain (7 - 10)  senna 2 Tablet(s) Oral at bedtime PRN Constipation      Assessment/Plan:  Patient is a 64y old F POD 1 s/p scalp wound washout and debridement with local scalp flap closure, doing well:     - DC home per NSx   - Wound care instructions for discharge: May remove Xeroform from incision sites, apply mupirocin BID (for 14 days). Keep scalp dry until follow up with Dr. [place] (10/29),   - Please have patient D/Cd home with Bactrim X 14 days   - Please have patient follow up with Dr. [place] on Tuesday (10/29), the patient may call [phone] to schedule an appointment.     Plastic Surgery

## 2019-10-26 NOTE — DISCHARGE NOTE PROVIDER - HOSPITAL COURSE
64F Admitted 10/25/19 presented to the ED for finding leaking from drainage tube today. No fever, or neck or headache. No nauseous or vomiting. No vision change, no difficulty walking, tingling or numnbess. She woke up and found her pillow soaked with fluid. Prompted her coming to the ED. On arrival her wound was cleaned, eschar was removed, and she was found to have wound dehiscence. Patient was subsequently taken to the OR for wound exploration, washout, and plastics closure. Patient discharged next day.

## 2019-10-26 NOTE — PROGRESS NOTE ADULT - SUBJECTIVE AND OBJECTIVE BOX
Visit Summary: Patient seen and evaluated at bedside.    Overnight Events: none    Exam:  T(C): 36.4 (10-26-19 @ 04:00), Max: 36.7 (10-25-19 @ 15:53)  HR: 91 (10-26-19 @ 08:00) (69 - 103)  BP: 116/66 (10-26-19 @ 08:00) (98/54 - 132/60)  RR: 14 (10-26-19 @ 08:00) (14 - 20)  SpO2: 100% (10-26-19 @ 08:00) (90% - 100%)  Wt(kg): --    AOx3, FC, PERRL, EOMI, no facial   5/5 throughout, no drift  SILT  no clonus    Incision with Xerofrom                        12.0   8.86  )-----------( 216      ( 26 Oct 2019 03:06 )             38.4     10-26    144  |  105  |  x   ----------------------------<  x   4.6   |  23  |  x     Ca    9.5      25 Oct 2019 10:23    TPro  6.9  /  Alb  4.3  /  TBili  0.3  /  DBili  x   /  AST  14  /  ALT  18  /  AlkPhos  87  10-25  PT/INR - ( 25 Oct 2019 16:55 )   PT: 11.6 sec;   INR: 1.02 ratio         PTT - ( 25 Oct 2019 16:55 )  PTT:29.4 sec

## 2019-10-26 NOTE — DISCHARGE NOTE PROVIDER - CARE PROVIDERS DIRECT ADDRESSES
[email or web address]

## 2019-10-26 NOTE — PROGRESS NOTE ADULT - ASSESSMENT
Macey Fajardo  64F presents s/p meningioma resection X2 last in september by Dr. Reyes presents to the ED today with clear drainage from wound early this morning.  Denies fevers, prior episodes. On exam neurologically intact. Incision non erythematous. On further assessment has an area of dehiscence in the midline. CTH without hydrocephalus or any collection s/p would revision with plastics closure yesterday  - Bactrim bid 14 days  - mupirocin ointment to wound 14 days  - plan for DC today Macey Fajardo  64F presents s/p meningioma resection X2 last in september by Dr. Reyes presents to the ED today with clear drainage from wound early this morning.  Denies fevers, prior episodes. On exam neurologically intact. Incision non erythematous. On further assessment has an area of dehiscence in the midline. CTH without hydrocephalus or any collection s/p would revision with plastics closure yesterday  - Bactrim bid 14 days  - mupirocin ointment to wound 14 days  - Check UA prior to DC  - plan for DC today

## 2019-10-26 NOTE — CHART NOTE - NSCHARTNOTEFT_GEN_A_CORE
CAPRINI SCORE [CLOT] Score on Admission for     AGE RELATED RISK FACTORS                                                       MOBILITY RELATED FACTORS  [ ] Age 41-60 years                                            (1 Point)                  [ ] Bed rest                                                        (1 Point)  [ x] Age: 61-74 years                                           (2 Points)                 [ ] Plaster cast                                                   (2 Points)  [ ] Age= 75 years                                              (3 Points)                 [ ] Bed bound for more than 72 hours                 (2 Points)    DISEASE RELATED RISK FACTORS                                               GENDER SPECIFIC FACTORS  [ ] Edema in the lower extremities                       (1 Point)                  [ ] Pregnancy                                                     (1 Point)  [ ] Varicose veins                                               (1 Point)                  [ ] Post-partum < 6 weeks                                   (1 Point)             [ ] BMI > 25 Kg/m2                                            (1 Point)                  [ ] Hormonal therapy  or oral contraception          (1 Point)                 [ ] Sepsis (in the previous month)                        (1 Point)                  [ ] History of pregnancy complications                 (1 point)  [ ] Pneumonia or serious lung disease                                               [ ] Unexplained or recurrent                     (1 Point)           (in the previous month)                               (1 Point)  [ ] Abnormal pulmonary function test                     (1 Point)                 SURGERY RELATED RISK FACTORS (include planned surgeries)  [ ] Acute myocardial infarction                              (1 Point)                 [ ]  Section                                             (1 Point)  [ ] Congestive heart failure (in the previous month)  (1 Point)         [ ] Minor surgery                                                  (1 Point)   [ ] Inflammatory bowel disease                             (1 Point)                 [ ] Arthroscopic surgery                                        (2 Points)  [ ] Central venous access                                      (2 Points)                [ x] General surgery lasting more than 45 minutes   (2 Points)       [ ] Stroke (in the previous month)                          (5 Points)               [ ] Elective arthroplasty                                         (5 Points)            [ ] current or past malignancy                              (2 Points)                                                                                                       HEMATOLOGY RELATED FACTORS                                                 TRAUMA RELATED RISK FACTORS  [ ] Prior episodes of VTE                                     (3 Points)                [ ] Fracture of the hip, pelvis, or leg                       (5 Points)  [ ] Positive family history for VTE                         (3 Points)                 [ ] Acute spinal cord injury (in the previous month)  (5 Points)  [ ] Prothrombin 94631 A                                     (3 Points)                 [ ] Paralysis  (less than 1 month)                             (5 Points)  [ ] Factor V Leiden                                             (3 Points)                  [ ] Multiple Trauma within 1 month                        (5 Points)  [ ] Lupus anticoagulants                                     (3 Points)                                                           [ ] Anticardiolipin antibodies                               (3 Points)                                                       [ ] High homocysteine in the blood                      (3 Points)                                             [ ] Other congenital or acquired thrombophilia      (3 Points)                                                [ ] Heparin induced thrombocytopenia                  (3 Points)                                          Total Score [  4        ]    Risk:  Very low 0   Low 1 to 2   Moderate 3 to 4   High =5       VTE Prophylasix Recommednations:  [x ] mechanical pneumatic compression devices                                      [ ] contraindicated: _____________________  [ x] chemo prophylasix                                                                                   [ ] contraindicated _____________________    **** HIGH LIKELIHOOD DVT PRESENT ON ADMISSION  [x ] (please order LE dopplers within 24 hours of admission)

## 2019-10-27 LAB
-  AMIKACIN: SIGNIFICANT CHANGE UP
-  AZTREONAM: SIGNIFICANT CHANGE UP
-  CEFEPIME: SIGNIFICANT CHANGE UP
-  CEFTAZIDIME: SIGNIFICANT CHANGE UP
-  CIPROFLOXACIN: SIGNIFICANT CHANGE UP
-  GENTAMICIN: SIGNIFICANT CHANGE UP
-  IMIPENEM: SIGNIFICANT CHANGE UP
-  LEVOFLOXACIN: SIGNIFICANT CHANGE UP
-  MEROPENEM: SIGNIFICANT CHANGE UP
-  PIPERACILLIN/TAZOBACTAM: SIGNIFICANT CHANGE UP
-  TOBRAMYCIN: SIGNIFICANT CHANGE UP
METHOD TYPE: SIGNIFICANT CHANGE UP

## 2019-10-29 ENCOUNTER — APPOINTMENT (OUTPATIENT)
Dept: PLASTIC SURGERY | Facility: CLINIC | Age: 64
End: 2019-10-29
Payer: MEDICARE

## 2019-10-29 PROCEDURE — 99024 POSTOP FOLLOW-UP VISIT: CPT

## 2019-11-01 LAB
-  AMIKACIN: SIGNIFICANT CHANGE UP
-  AZTREONAM: SIGNIFICANT CHANGE UP
-  CEFEPIME: SIGNIFICANT CHANGE UP
-  CEFTRIAXONE: SIGNIFICANT CHANGE UP
-  CIPROFLOXACIN: SIGNIFICANT CHANGE UP
-  GENTAMICIN: SIGNIFICANT CHANGE UP
-  LEVOFLOXACIN: SIGNIFICANT CHANGE UP
-  MEROPENEM: SIGNIFICANT CHANGE UP
-  PIPERACILLIN/TAZOBACTAM: SIGNIFICANT CHANGE UP
-  TOBRAMYCIN: SIGNIFICANT CHANGE UP
-  TRIMETHOPRIM/SULFAMETHOXAZOLE: SIGNIFICANT CHANGE UP
CULTURE RESULTS: SIGNIFICANT CHANGE UP
METHOD TYPE: SIGNIFICANT CHANGE UP
ORGANISM # SPEC MICROSCOPIC CNT: SIGNIFICANT CHANGE UP
SPECIMEN SOURCE: SIGNIFICANT CHANGE UP

## 2019-11-07 NOTE — HISTORY OF PRESENT ILLNESS
[FreeTextEntry1] : 64-year-old female presents for followup visit status post debridement of scalp wound and scalp flap closure. Patient is doing well no fevers no drainage

## 2019-11-12 ENCOUNTER — APPOINTMENT (OUTPATIENT)
Dept: PLASTIC SURGERY | Facility: CLINIC | Age: 64
End: 2019-11-12
Payer: MEDICARE

## 2019-11-12 PROCEDURE — 75894 X-RAYS TRANSCATH THERAPY: CPT

## 2019-11-12 PROCEDURE — 80053 COMPREHEN METABOLIC PANEL: CPT

## 2019-11-12 PROCEDURE — 80076 HEPATIC FUNCTION PANEL: CPT

## 2019-11-12 PROCEDURE — 84132 ASSAY OF SERUM POTASSIUM: CPT

## 2019-11-12 PROCEDURE — 86850 RBC ANTIBODY SCREEN: CPT

## 2019-11-12 PROCEDURE — 86901 BLOOD TYPING SEROLOGIC RH(D): CPT

## 2019-11-12 PROCEDURE — 90686 IIV4 VACC NO PRSV 0.5 ML IM: CPT

## 2019-11-12 PROCEDURE — 88307 TISSUE EXAM BY PATHOLOGIST: CPT

## 2019-11-12 PROCEDURE — 97535 SELF CARE MNGMENT TRAINING: CPT

## 2019-11-12 PROCEDURE — 82962 GLUCOSE BLOOD TEST: CPT

## 2019-11-12 PROCEDURE — 84295 ASSAY OF SERUM SODIUM: CPT

## 2019-11-12 PROCEDURE — 75898 FOLLOW-UP ANGIOGRAPHY: CPT

## 2019-11-12 PROCEDURE — 88311 DECALCIFY TISSUE: CPT

## 2019-11-12 PROCEDURE — C2628: CPT

## 2019-11-12 PROCEDURE — 93970 EXTREMITY STUDY: CPT

## 2019-11-12 PROCEDURE — 80051 ELECTROLYTE PANEL: CPT

## 2019-11-12 PROCEDURE — 86923 COMPATIBILITY TEST ELECTRIC: CPT

## 2019-11-12 PROCEDURE — 87070 CULTURE OTHR SPECIMN AEROBIC: CPT

## 2019-11-12 PROCEDURE — 88360 TUMOR IMMUNOHISTOCHEM/MANUAL: CPT

## 2019-11-12 PROCEDURE — 85014 HEMATOCRIT: CPT

## 2019-11-12 PROCEDURE — 97166 OT EVAL MOD COMPLEX 45 MIN: CPT

## 2019-11-12 PROCEDURE — 85730 THROMBOPLASTIN TIME PARTIAL: CPT

## 2019-11-12 PROCEDURE — 83036 HEMOGLOBIN GLYCOSYLATED A1C: CPT

## 2019-11-12 PROCEDURE — 86900 BLOOD TYPING SEROLOGIC ABO: CPT

## 2019-11-12 PROCEDURE — 85610 PROTHROMBIN TIME: CPT

## 2019-11-12 PROCEDURE — 70450 CT HEAD/BRAIN W/O DYE: CPT

## 2019-11-12 PROCEDURE — 99024 POSTOP FOLLOW-UP VISIT: CPT

## 2019-11-12 PROCEDURE — 83605 ASSAY OF LACTIC ACID: CPT

## 2019-11-12 PROCEDURE — 99285 EMERGENCY DEPT VISIT HI MDM: CPT

## 2019-11-12 PROCEDURE — 81001 URINALYSIS AUTO W/SCOPE: CPT

## 2019-11-12 PROCEDURE — 97530 THERAPEUTIC ACTIVITIES: CPT

## 2019-11-12 PROCEDURE — C1894: CPT

## 2019-11-12 PROCEDURE — 82947 ASSAY GLUCOSE BLOOD QUANT: CPT

## 2019-11-12 PROCEDURE — 82435 ASSAY OF BLOOD CHLORIDE: CPT

## 2019-11-12 PROCEDURE — 36227 PLACE CATH XTRNL CAROTID: CPT

## 2019-11-12 PROCEDURE — 97162 PT EVAL MOD COMPLEX 30 MIN: CPT

## 2019-11-12 PROCEDURE — 61626 TCAT PERM OCCLS/EMBOL NONCNS: CPT

## 2019-11-12 PROCEDURE — 88342 IMHCHEM/IMCYTCHM 1ST ANTB: CPT

## 2019-11-12 PROCEDURE — 82565 ASSAY OF CREATININE: CPT

## 2019-11-12 PROCEDURE — 36224 PLACE CATH CAROTD ART: CPT

## 2019-11-12 PROCEDURE — 84100 ASSAY OF PHOSPHORUS: CPT

## 2019-11-12 PROCEDURE — C1713: CPT

## 2019-11-12 PROCEDURE — 82803 BLOOD GASES ANY COMBINATION: CPT

## 2019-11-12 PROCEDURE — 86803 HEPATITIS C AB TEST: CPT

## 2019-11-12 PROCEDURE — 85027 COMPLETE CBC AUTOMATED: CPT

## 2019-11-12 PROCEDURE — 87086 URINE CULTURE/COLONY COUNT: CPT

## 2019-11-12 PROCEDURE — C1769: CPT

## 2019-11-12 PROCEDURE — 76380 CAT SCAN FOLLOW-UP STUDY: CPT

## 2019-11-12 PROCEDURE — 82330 ASSAY OF CALCIUM: CPT

## 2019-11-12 PROCEDURE — P9016: CPT

## 2019-11-12 PROCEDURE — 71045 X-RAY EXAM CHEST 1 VIEW: CPT

## 2019-11-12 PROCEDURE — 84443 ASSAY THYROID STIM HORMONE: CPT

## 2019-11-12 PROCEDURE — 97116 GAIT TRAINING THERAPY: CPT

## 2019-11-12 PROCEDURE — 80048 BASIC METABOLIC PNL TOTAL CA: CPT

## 2019-11-12 PROCEDURE — C1887: CPT

## 2019-11-12 PROCEDURE — C1889: CPT

## 2019-11-12 PROCEDURE — 97110 THERAPEUTIC EXERCISES: CPT

## 2019-11-12 PROCEDURE — 70553 MRI BRAIN STEM W/O & W/DYE: CPT

## 2019-11-12 PROCEDURE — 87186 SC STD MICRODIL/AGAR DIL: CPT

## 2019-11-12 PROCEDURE — 83735 ASSAY OF MAGNESIUM: CPT

## 2019-11-13 ENCOUNTER — APPOINTMENT (OUTPATIENT)
Dept: NEUROSURGERY | Facility: CLINIC | Age: 64
End: 2019-11-13
Payer: MEDICARE

## 2019-11-13 PROCEDURE — 99024 POSTOP FOLLOW-UP VISIT: CPT

## 2019-11-20 ENCOUNTER — INBOUND DOCUMENT (OUTPATIENT)
Age: 64
End: 2019-11-20

## 2019-11-24 ENCOUNTER — INPATIENT (INPATIENT)
Facility: HOSPITAL | Age: 64
LOS: 7 days | Discharge: HOME | DRG: 31 | End: 2019-12-02
Attending: NEUROLOGICAL SURGERY | Admitting: NEUROLOGICAL SURGERY
Payer: MEDICARE

## 2019-11-24 VITALS
SYSTOLIC BLOOD PRESSURE: 127 MMHG | HEIGHT: 64 IN | WEIGHT: 169.98 LBS | OXYGEN SATURATION: 95 % | TEMPERATURE: 98 F | RESPIRATION RATE: 20 BRPM | HEART RATE: 76 BPM | DIASTOLIC BLOOD PRESSURE: 87 MMHG

## 2019-11-24 DIAGNOSIS — Z98.890 OTHER SPECIFIED POSTPROCEDURAL STATES: Chronic | ICD-10-CM

## 2019-11-24 DIAGNOSIS — Z98.1 ARTHRODESIS STATUS: Chronic | ICD-10-CM

## 2019-11-24 DIAGNOSIS — Z98.890 OTHER SPECIFIED POSTPROCEDURAL STATES: ICD-10-CM

## 2019-11-24 LAB
ALBUMIN SERPL ELPH-MCNC: 4.2 G/DL — SIGNIFICANT CHANGE UP (ref 3.3–5)
ALP SERPL-CCNC: 79 U/L — SIGNIFICANT CHANGE UP (ref 40–120)
ALT FLD-CCNC: 18 U/L — SIGNIFICANT CHANGE UP (ref 10–45)
ANION GAP SERPL CALC-SCNC: 13 MMOL/L — SIGNIFICANT CHANGE UP (ref 5–17)
ANION GAP SERPL CALC-SCNC: 15 MMOL/L — SIGNIFICANT CHANGE UP (ref 5–17)
APPEARANCE CSF: CLEAR — SIGNIFICANT CHANGE UP
APTT BLD: 28.2 SEC — SIGNIFICANT CHANGE UP (ref 27.5–36.3)
AST SERPL-CCNC: 16 U/L — SIGNIFICANT CHANGE UP (ref 10–40)
BASOPHILS # BLD AUTO: 0.02 K/UL — SIGNIFICANT CHANGE UP (ref 0–0.2)
BASOPHILS NFR BLD AUTO: 0.4 % — SIGNIFICANT CHANGE UP (ref 0–2)
BILIRUB SERPL-MCNC: 0.3 MG/DL — SIGNIFICANT CHANGE UP (ref 0.2–1.2)
BLD GP AB SCN SERPL QL: NEGATIVE — SIGNIFICANT CHANGE UP
BUN SERPL-MCNC: 18 MG/DL — SIGNIFICANT CHANGE UP (ref 7–23)
BUN SERPL-MCNC: 22 MG/DL — SIGNIFICANT CHANGE UP (ref 7–23)
CALCIUM SERPL-MCNC: 9.6 MG/DL — SIGNIFICANT CHANGE UP (ref 8.4–10.5)
CALCIUM SERPL-MCNC: 9.6 MG/DL — SIGNIFICANT CHANGE UP (ref 8.4–10.5)
CHLORIDE SERPL-SCNC: 104 MMOL/L — SIGNIFICANT CHANGE UP (ref 96–108)
CHLORIDE SERPL-SCNC: 104 MMOL/L — SIGNIFICANT CHANGE UP (ref 96–108)
CO2 SERPL-SCNC: 24 MMOL/L — SIGNIFICANT CHANGE UP (ref 22–31)
CO2 SERPL-SCNC: 27 MMOL/L — SIGNIFICANT CHANGE UP (ref 22–31)
COLOR CSF: SIGNIFICANT CHANGE UP
CREAT SERPL-MCNC: 0.97 MG/DL — SIGNIFICANT CHANGE UP (ref 0.5–1.3)
CREAT SERPL-MCNC: 1.16 MG/DL — SIGNIFICANT CHANGE UP (ref 0.5–1.3)
EOSINOPHIL # BLD AUTO: 0.18 K/UL — SIGNIFICANT CHANGE UP (ref 0–0.5)
EOSINOPHIL NFR BLD AUTO: 3.5 % — SIGNIFICANT CHANGE UP (ref 0–6)
GLUCOSE CSF-MCNC: 61 MG/DL — SIGNIFICANT CHANGE UP (ref 40–70)
GLUCOSE SERPL-MCNC: 102 MG/DL — HIGH (ref 70–99)
GLUCOSE SERPL-MCNC: 123 MG/DL — HIGH (ref 70–99)
HCT VFR BLD CALC: 41.1 % — SIGNIFICANT CHANGE UP (ref 34.5–45)
HGB BLD-MCNC: 13.3 G/DL — SIGNIFICANT CHANGE UP (ref 11.5–15.5)
IMM GRANULOCYTES NFR BLD AUTO: 0.4 % — SIGNIFICANT CHANGE UP (ref 0–1.5)
INR BLD: 0.98 RATIO — SIGNIFICANT CHANGE UP (ref 0.88–1.16)
LACTATE CSF-MCNC: 1.7 MMOL/L — SIGNIFICANT CHANGE UP (ref 1.1–2.4)
LYMPHOCYTES # BLD AUTO: 1.36 K/UL — SIGNIFICANT CHANGE UP (ref 1–3.3)
LYMPHOCYTES # BLD AUTO: 26.1 % — SIGNIFICANT CHANGE UP (ref 13–44)
LYMPHOCYTES # CSF: 50 % — SIGNIFICANT CHANGE UP (ref 40–80)
MAGNESIUM SERPL-MCNC: 2.1 MG/DL — SIGNIFICANT CHANGE UP (ref 1.6–2.6)
MCHC RBC-ENTMCNC: 27.8 PG — SIGNIFICANT CHANGE UP (ref 27–34)
MCHC RBC-ENTMCNC: 32.4 GM/DL — SIGNIFICANT CHANGE UP (ref 32–36)
MCV RBC AUTO: 85.8 FL — SIGNIFICANT CHANGE UP (ref 80–100)
MONOCYTES # BLD AUTO: 0.45 K/UL — SIGNIFICANT CHANGE UP (ref 0–0.9)
MONOCYTES NFR BLD AUTO: 8.6 % — SIGNIFICANT CHANGE UP (ref 2–14)
MONOS+MACROS NFR CSF: 40 % — SIGNIFICANT CHANGE UP (ref 15–45)
NEUTROPHILS # BLD AUTO: 3.18 K/UL — SIGNIFICANT CHANGE UP (ref 1.8–7.4)
NEUTROPHILS # CSF: 10 % — HIGH (ref 0–6)
NEUTROPHILS NFR BLD AUTO: 61 % — SIGNIFICANT CHANGE UP (ref 43–77)
NRBC # BLD: 0 /100 WBCS — SIGNIFICANT CHANGE UP (ref 0–0)
NRBC NFR CSF: <1 — SIGNIFICANT CHANGE UP (ref 0–5)
PHOSPHATE SERPL-MCNC: 2.7 MG/DL — SIGNIFICANT CHANGE UP (ref 2.5–4.5)
PLATELET # BLD AUTO: 253 K/UL — SIGNIFICANT CHANGE UP (ref 150–400)
POTASSIUM SERPL-MCNC: 4.1 MMOL/L — SIGNIFICANT CHANGE UP (ref 3.5–5.3)
POTASSIUM SERPL-MCNC: 4.3 MMOL/L — SIGNIFICANT CHANGE UP (ref 3.5–5.3)
POTASSIUM SERPL-SCNC: 4.1 MMOL/L — SIGNIFICANT CHANGE UP (ref 3.5–5.3)
POTASSIUM SERPL-SCNC: 4.3 MMOL/L — SIGNIFICANT CHANGE UP (ref 3.5–5.3)
PROT CSF-MCNC: 40 MG/DL — SIGNIFICANT CHANGE UP (ref 15–45)
PROT SERPL-MCNC: 6.9 G/DL — SIGNIFICANT CHANGE UP (ref 6–8.3)
PROTHROM AB SERPL-ACNC: 11.3 SEC — SIGNIFICANT CHANGE UP (ref 10–12.9)
RBC # BLD: 4.79 M/UL — SIGNIFICANT CHANGE UP (ref 3.8–5.2)
RBC # CSF: 187 /UL — HIGH (ref 0–0)
RBC # FLD: 12.7 % — SIGNIFICANT CHANGE UP (ref 10.3–14.5)
RH IG SCN BLD-IMP: POSITIVE — SIGNIFICANT CHANGE UP
SODIUM SERPL-SCNC: 143 MMOL/L — SIGNIFICANT CHANGE UP (ref 135–145)
SODIUM SERPL-SCNC: 144 MMOL/L — SIGNIFICANT CHANGE UP (ref 135–145)
TUBE TYPE: SIGNIFICANT CHANGE UP
WBC # BLD: 5.21 K/UL — SIGNIFICANT CHANGE UP (ref 3.8–10.5)
WBC # FLD AUTO: 5.21 K/UL — SIGNIFICANT CHANGE UP (ref 3.8–10.5)

## 2019-11-24 PROCEDURE — 62272 THER SPI PNXR DRG CSF: CPT

## 2019-11-24 PROCEDURE — 72131 CT LUMBAR SPINE W/O DYE: CPT | Mod: 26

## 2019-11-24 PROCEDURE — 93970 EXTREMITY STUDY: CPT | Mod: 26

## 2019-11-24 PROCEDURE — 99285 EMERGENCY DEPT VISIT HI MDM: CPT | Mod: GC

## 2019-11-24 PROCEDURE — 99291 CRITICAL CARE FIRST HOUR: CPT

## 2019-11-24 PROCEDURE — 99292 CRITICAL CARE ADDL 30 MIN: CPT

## 2019-11-24 PROCEDURE — 77003 FLUOROGUIDE FOR SPINE INJECT: CPT | Mod: 26

## 2019-11-24 PROCEDURE — 70450 CT HEAD/BRAIN W/O DYE: CPT | Mod: 26

## 2019-11-24 RX ORDER — CLONAZEPAM 1 MG
0.5 TABLET ORAL EVERY 8 HOURS
Refills: 0 | Status: DISCONTINUED | OUTPATIENT
Start: 2019-11-24 | End: 2019-11-29

## 2019-11-24 RX ORDER — ACETAMINOPHEN 500 MG
325 TABLET ORAL EVERY 4 HOURS
Refills: 0 | Status: DISCONTINUED | OUTPATIENT
Start: 2019-11-24 | End: 2019-11-26

## 2019-11-24 RX ORDER — ARIPIPRAZOLE 15 MG/1
2 TABLET ORAL DAILY
Refills: 0 | Status: DISCONTINUED | OUTPATIENT
Start: 2019-11-24 | End: 2019-11-29

## 2019-11-24 RX ORDER — SENNA PLUS 8.6 MG/1
2 TABLET ORAL AT BEDTIME
Refills: 0 | Status: DISCONTINUED | OUTPATIENT
Start: 2019-11-24 | End: 2019-11-29

## 2019-11-24 RX ORDER — LEVOTHYROXINE SODIUM 125 MCG
88 TABLET ORAL DAILY
Refills: 0 | Status: DISCONTINUED | OUTPATIENT
Start: 2019-11-24 | End: 2019-11-29

## 2019-11-24 RX ORDER — ATORVASTATIN CALCIUM 80 MG/1
40 TABLET, FILM COATED ORAL AT BEDTIME
Refills: 0 | Status: DISCONTINUED | OUTPATIENT
Start: 2019-11-24 | End: 2019-11-29

## 2019-11-24 RX ORDER — ONDANSETRON 8 MG/1
4 TABLET, FILM COATED ORAL EVERY 6 HOURS
Refills: 0 | Status: DISCONTINUED | OUTPATIENT
Start: 2019-11-24 | End: 2019-11-29

## 2019-11-24 RX ORDER — CHLORHEXIDINE GLUCONATE 213 G/1000ML
1 SOLUTION TOPICAL
Refills: 0 | Status: DISCONTINUED | OUTPATIENT
Start: 2019-11-24 | End: 2019-11-29

## 2019-11-24 RX ORDER — FOLIC ACID 0.8 MG
1 TABLET ORAL DAILY
Refills: 0 | Status: DISCONTINUED | OUTPATIENT
Start: 2019-11-24 | End: 2019-11-29

## 2019-11-24 RX ORDER — SODIUM CHLORIDE 9 MG/ML
1000 INJECTION INTRAMUSCULAR; INTRAVENOUS; SUBCUTANEOUS
Refills: 0 | Status: DISCONTINUED | OUTPATIENT
Start: 2019-11-24 | End: 2019-11-25

## 2019-11-24 RX ORDER — POLYETHYLENE GLYCOL 3350 17 G/17G
17 POWDER, FOR SOLUTION ORAL EVERY 12 HOURS
Refills: 0 | Status: DISCONTINUED | OUTPATIENT
Start: 2019-11-24 | End: 2019-11-29

## 2019-11-24 RX ORDER — DEXTROSE MONOHYDRATE, SODIUM CHLORIDE, AND POTASSIUM CHLORIDE 50; .745; 4.5 G/1000ML; G/1000ML; G/1000ML
1000 INJECTION, SOLUTION INTRAVENOUS
Refills: 0 | Status: DISCONTINUED | OUTPATIENT
Start: 2019-11-24 | End: 2019-11-24

## 2019-11-24 RX ORDER — BUPROPION HYDROCHLORIDE 150 MG/1
300 TABLET, EXTENDED RELEASE ORAL DAILY
Refills: 0 | Status: DISCONTINUED | OUTPATIENT
Start: 2019-11-24 | End: 2019-11-29

## 2019-11-24 RX ADMIN — DEXTROSE MONOHYDRATE, SODIUM CHLORIDE, AND POTASSIUM CHLORIDE 75 MILLILITER(S): 50; .745; 4.5 INJECTION, SOLUTION INTRAVENOUS at 21:33

## 2019-11-24 RX ADMIN — Medication 0.5 MILLIGRAM(S): at 21:33

## 2019-11-24 RX ADMIN — CHLORHEXIDINE GLUCONATE 1 APPLICATION(S): 213 SOLUTION TOPICAL at 21:33

## 2019-11-24 RX ADMIN — DEXTROSE MONOHYDRATE, SODIUM CHLORIDE, AND POTASSIUM CHLORIDE 75 MILLILITER(S): 50; .745; 4.5 INJECTION, SOLUTION INTRAVENOUS at 15:00

## 2019-11-24 RX ADMIN — ATORVASTATIN CALCIUM 40 MILLIGRAM(S): 80 TABLET, FILM COATED ORAL at 21:33

## 2019-11-24 NOTE — ED PROVIDER NOTE - PHYSICAL EXAMINATION
MSK: large post-craniotomy incision site visible over top of scalp from L temporal to R temporal region, no obvious signs of cellulitis or swelling, not actively leaking, appears to be healing well

## 2019-11-24 NOTE — PROGRESS NOTE ADULT - SUBJECTIVE AND OBJECTIVE BOX
Pt is a 63 yo female hx of anxiety, depression , hypothyroidism , previous UTI, hx of meningioma 9/24/19. Admitted 11/24/19 with report of sheets damp and evaluation suspicious of CSF leak.  Allergy - PCN- hives      ICU Vital Signs Last 24 Hrs  T(C): 36.5 (24 Nov 2019 12:10), Max: 36.7 (24 Nov 2019 11:00)  T(F): 97.7 (24 Nov 2019 12:10), Max: 98 (24 Nov 2019 11:00)  HR: 75 (24 Nov 2019 14:00) (70 - 89)  BP: 136/76 (24 Nov 2019 14:00) (127/85 - 143/89)  BP(mean): 95 (24 Nov 2019 14:00) (95 - 107)  RR: 11 (24 Nov 2019 14:00) (11 - 20)  SpO2: 100% (24 Nov 2019 14:00) (94% - 100%)    Home Medications:  Abilify 2 mg oral tablet: 1 tab(s) orally once a day (25 Sep 2019 12:26)  acetaminophen 325 mg oral tablet: 2 tab(s) orally every 6 hours, As needed, Mild Pain (1 - 3) (02 Oct 2019 10:38)  clonazePAM 0.5 mg oral tablet: 1 tab(s) orally every 8 hours (02 Oct 2019 10:38)  docusate sodium 100 mg oral capsule: 1 cap(s) orally 3 times a day (02 Oct 2019 10:38)  doxepin 50 mg oral capsule: 1 cap(s) orally once a day (at bedtime) for sleep (25 Sep 2019 12:26)  folic acid 1 mg oral tablet: 1 tab(s) orally once a day (02 Oct 2019 10:38)  Lipitor 40 mg oral tablet: 1 tab(s) orally once a day (25 Sep 2019 12:26)  senna oral tablet: 2 tab(s) orally once a day (at bedtime) (02 Oct 2019 10:38)  Wellbutrin  mg/24 hours oral tablet, extended release: 1 tab(s) orally every 24 hours (25 Sep 2019 12:26)                          13.3   5.21  )-----------( 253      ( 24 Nov 2019 08:53 )             41.1     11-24    144  |  104  |  22  ----------------------------<  102<H>  4.3   |  27  |  1.16    Ca    9.6      24 Nov 2019 08:53    TPro  6.9  /  Alb  4.2  /  TBili  0.3  /  DBili  x   /  AST  16  /  ALT  18  /  AlkPhos  79  11-24    TSH - 0.06     CT Lumbar Spine No Cont (11.24.19 @ 12:18) >  IMPRESSION:    Status post L4 and L5 total laminectomies. Status post posterior fusion   of L4-S1. Hardware well-placed. No evidence for hardware fracture or   loosening.    Evaluation of spinal canal contents limited, markedly so at the levels of   surgery.    PHYSICAL EXAM:    Constitutional: No Acute Distress     Neurological: Awake, alert oriented to person, place and time, Following Commands, PERRL, EOMI, No Gaze Preference, Face Symmetrical, Speech Fluent, No dysmetria, No ataxia, No nystagmus     Motor exam:          Upper extremity                         Delt     Bicep     Tricep    HG                                                 R         5/5        5/5        5/5       5/5                                               L          5/5        5/5        5/5       5/5          Lower extremity                        HF         KF        KE       DF         PF                                                  R        5/5        5/5        5/5       5/5         5/5                                               L         5/5        5/5       5/5       5/5          5/5                                                 Sensation: [ ] intact to light touch  [ ] decreased:     Reflexes: Deep Tendon Reflexes Intact     Pulmonary: Clear to Auscultation, No rales, No rhonchi, No wheezes     Cardiovascular: S1, S2, Regular rate and rhythm     Gastrointestinal: Soft, Non-tender, Non-distended     Extremities: No calf tenderness     Incision: Pt is a 63 yo female hx of anxiety, depression , hypothyroidism , previous UTI, hx of meningioma 9/24/19. Admitted 11/24/19 with report of sheets damp and evaluation suspicious of CSF leak.  Allergy - PCN- hives      ICU Vital Signs Last 24 Hrs  T(C): 36.5 (24 Nov 2019 12:10), Max: 36.7 (24 Nov 2019 11:00)  T(F): 97.7 (24 Nov 2019 12:10), Max: 98 (24 Nov 2019 11:00)  HR: 75 (24 Nov 2019 14:00) (70 - 89)  BP: 136/76 (24 Nov 2019 14:00) (127/85 - 143/89)  BP(mean): 95 (24 Nov 2019 14:00) (95 - 107)  RR: 11 (24 Nov 2019 14:00) (11 - 20)  SpO2: 100% (24 Nov 2019 14:00) (94% - 100%)    Home Medications:  Abilify 2 mg oral tablet: 1 tab(s) orally once a day (25 Sep 2019 12:26)  acetaminophen 325 mg oral tablet: 2 tab(s) orally every 6 hours, As needed, Mild Pain (1 - 3) (02 Oct 2019 10:38)  clonazePAM 0.5 mg oral tablet: 1 tab(s) orally every 8 hours (02 Oct 2019 10:38)  docusate sodium 100 mg oral capsule: 1 cap(s) orally 3 times a day (02 Oct 2019 10:38)  doxepin 50 mg oral capsule: 1 cap(s) orally once a day (at bedtime) for sleep (25 Sep 2019 12:26)  folic acid 1 mg oral tablet: 1 tab(s) orally once a day (02 Oct 2019 10:38)  Lipitor 40 mg oral tablet: 1 tab(s) orally once a day (25 Sep 2019 12:26)  senna oral tablet: 2 tab(s) orally once a day (at bedtime) (02 Oct 2019 10:38)  Wellbutrin  mg/24 hours oral tablet, extended release: 1 tab(s) orally every 24 hours (25 Sep 2019 12:26)                          13.3   5.21  )-----------( 253      ( 24 Nov 2019 08:53 )             41.1     11-24    144  |  104  |  22  ----------------------------<  102<H>  4.3   |  27  |  1.16    Ca    9.6      24 Nov 2019 08:53    TPro  6.9  /  Alb  4.2  /  TBili  0.3  /  DBili  x   /  AST  16  /  ALT  18  /  AlkPhos  79  11-24    TSH - 0.06     CT Lumbar Spine No Cont (11.24.19 @ 12:18) >  IMPRESSION:    Status post L4 and L5 total laminectomies. Status post posterior fusion   of L4-S1. Hardware well-placed. No evidence for hardware fracture or   loosening.    Evaluation of spinal canal contents limited, markedly so at the levels of   surgery.    PHYSICAL EXAM:    Constitutional: No Acute Distress     Neurological: Awake, alert oriented to person, place and time, Following Commands, PERRL, EOMI, No Gaze Preference, Face Symmetrical, Speech Fluent, No dysmetria, , No nystagmus     Motor exam:          Upper extremity                         Delt     Bicep     Tricep    HG                                                 R         5/5        5/5        5/5       5/5                                               L          5/5        5/5        5/5       5/5          Lower extremity                        HF         KF        KE       DF         PF                                                  R        5/5        5/5        5/5       5/5         5/5                                               L         5/5        5/5       5/5       5/5          5/5                                                 Sensation: [ X] intact to light touch       Pulmonary: Clear to Auscultation, No rales, No rhonchi, No wheezes     Cardiovascular: S1, S2, Regular rate and rhythm     Gastrointestinal: Soft, Non-tender, Non-distended     Extremities: No calf tenderness     Incision: slight eryhthema at surg site

## 2019-11-24 NOTE — PROGRESS NOTE ADULT - SUBJECTIVE AND OBJECTIVE BOX
previous crani meningioma resection, now c/o would leakage  LD placed    Vitals/labs/meds/imaging reviewed  flat affect, fully oriented, BUE no drift 5/5, BLE 5/5

## 2019-11-24 NOTE — ED PROVIDER NOTE - CLINICAL SUMMARY MEDICAL DECISION MAKING FREE TEXT BOX
Pt s/p recent resection of meningioma c/b leaking from incision site, p/w possible leak from incision site, pillow and hair wet on R side this AM, denies possibility of other source of moisture. Otherwise asymptomatic. Will consult neurosurg and appreciate gregg Mike

## 2019-11-24 NOTE — ED PROVIDER NOTE - ATTENDING CONTRIBUTION TO CARE
attending Hayes: 64yF s/p recent resection of meningioma c/b leaking from incision site with postop closure with plastic surgery, p/w possible recurrent leak from incision site. Denies fevers, headache. Will obtain preop labs, NSG consult, imaging as per NSG

## 2019-11-24 NOTE — ED ADULT NURSE NOTE - NSIMPLEMENTINTERV_GEN_ALL_ED
Implemented All Fall Risk Interventions:  [place] to call system. Call bell, personal items and telephone within reach. Instruct patient to call for assistance. Room bathroom lighting operational. Non-slip footwear when patient is off stretcher. Physically safe environment: no spills, clutter or unnecessary equipment. Stretcher in lowest position, wheels locked, appropriate side rails in place. Provide visual cue, wrist band, yellow gown, etc. Monitor gait and stability. Monitor for mental status changes and reorient to person, place, and time. Review medications for side effects contributing to fall risk. Reinforce activity limits and safety measures with patient and family.

## 2019-11-24 NOTE — PROGRESS NOTE ADULT - ASSESSMENT
wound leakage    LD @5cc/hr  MRI w/w/o  OR plan per neurosurgery  cont home psych meds  -150  encourage incentive spirometry as able  cont synthroid  hold chemoppx, possible pre op tomorrow  monitor for fevers

## 2019-11-24 NOTE — ED ADULT NURSE NOTE - OBJECTIVE STATEMENT
63 y/o female s/p recent resection, craniotomy, pt c/o possible leak from incision site,  with pillow and hair wet on Right side this morning with clear drainage as per pt.  Pt denies any symptoms,  pt denies chest pain, SOB, abd pain, n/v/d, fever, chills, dizziness, headache. 65 y/o female s/p recent resection, craniotomy, pt c/o possible leak from incision site,  with pillow and hair wet on Right side this morning with clear drainage as per pt.  Pt denies any symptoms,  pt denies chest pain, SOB, abd pain, n/v/d, fever, chills, dizziness, headache.  Pt with large post-craniotomy incision site over top of scalp from Left temporal to Right  temporal region, no obvious signs of swelling, no actively leaking, appears to be healing well.  No acute respiratory distress noted.

## 2019-11-24 NOTE — ED PROVIDER NOTE - OBJECTIVE STATEMENT
64y f s/p recent resection of meningioma c/b leaking from incision site, p/w possible leak from incision site, pillow and hair wet on R side this AM, denies possibility of other source of moisture. Otherwise asymptomatic. Will consult neurosurg and appreciate gregg Mike 64y f s/p recent resection of meningioma c/b leaking from incision site, p/w possible leak from incision site, pillow and hair wet on R side this AM, denies possibility of other source of moisture. Otherwise asymptomatic.

## 2019-11-24 NOTE — H&P ADULT - ASSESSMENT
64F s/p recent resection of meningioma c/b leaking from incision site s/p wound revision p/w possible leak from incision site, pillow and hair wet on R side this AM. HCT shows air under the mesh w. no large fluid collection. Exam: intact  -Plan for IR guided LD, due to hx of prior lumbar fusion.   -Pre op for  shunt tomorrow

## 2019-11-24 NOTE — PROGRESS NOTE ADULT - ATTENDING COMMENTS
I have formulated history, reviewed vitals, meds and provided physical exam . Assessment and exam provided by me.

## 2019-11-24 NOTE — ED PROVIDER NOTE - PSH
S/P lumbar spinal fusion  2003  S/P resection of meningioma  2003  S/P thyroidectomy  1999 for nodule

## 2019-11-24 NOTE — ED PROVIDER NOTE - FAMILY HISTORY
FH: premature coronary heart disease, Father  at 55 from MI     Mother  Still living? Unknown  FH: lung cancer, Age at diagnosis: Age Unknown

## 2019-11-24 NOTE — PROGRESS NOTE ADULT - ASSESSMENT
ASSESSMENT/PLAN:    NEURO:  Activity: [] OOB as tolerated [] Bedrest [] PT [] OT [] PMNR [] Bed in Chair Position     PULM:    CV:  SBP goal    RENAL:  Fluids:    GI:  Diet:  GI prophylaxis [] not indicated [] PPI [] other:  Bowel regimen [] colace [] senna [] other:    ENDO:   Goal euglycemia (-180)    HEME/ONC:  VTE prophylaxis: [] SCDs [] chemoprophylaxis    ID:    MISC:    SOCIAL/FAMILY:  [] updated at bedside [] family meeting    CODE STATUS:  [] Full Code [] DNR [] DNI [] Palliative/Comfort Care    DISPOSITION:  [] ICU [] Stroke Unit [] Floor [] EMU [] RCU [] PCU    [] Patient is at high risk of neurologic deterioration/death due to:     Time seen:  Time spent: ___ [] critical care minutes ASSESSMENT/PLAN: S/P meningioma resection 9/19 , CSF leakage     NEURO:  neuro checks q 1hr   LD this afternoon  CSF q 1 hr to be removed  Resume meds - home   VPS in am   Activity: [] OOB as tolerated [] Bedrest [] PT [] OT [] PMNR [] Bed in Chair Position     PULM:  Incentive gianfranco  Maintain Sats > 92 %    CV:  SBP goal- 100- < 150  NS at 70 cc/hr     RENAL:      GI: NPO after MN   Diet: regular before MN   GI prophylaxis [X] not indicated  Bowel regimen [] colace [X] senna [] other:    ENDO:   Goal euglycemia (-180)    HEME/ONC:  VTE prophylaxis: [X] SCDs [] chemoprophylaxis  Doppler LE     ID: Afebrile     SOCIAL/FAMILY:  [X] updated at bedside     CODE STATUS:  [X] Full Code     DISPOSITION:  [X] ICU     [X] Patient is at high risk of neurologic deterioration/death due to:  CSF leakage and infection     Time seen: 1400  Time spent: 30 critical care minutes ASSESSMENT/PLAN: S/P meningioma resection 9/19 , CSF leakage     NEURO:  neuro checks q 1hr   LD this afternoon- CSF to be sent for cell count, protein , glucose and culture  CSF q 1 hr to be removed  Resume meds - home   VPS in am   Activity: [] OOB as tolerated [] Bedrest [] PT [] OT [] PMNR [] Bed in Chair Position     PULM:  Incentive gianfranco  Maintain Sats > 92 %    CV:  SBP goal- 100- < 150  NS at 70 cc/hr     RENAL:      GI: NPO after MN   Diet: regular before MN   GI prophylaxis [X] not indicated  Bowel regimen [] colace [X] senna [] other:    ENDO:   Goal euglycemia (-180)    HEME/ONC:  VTE prophylaxis: [X] SCDs [] chemoprophylaxis  Doppler LE     ID: Afebrile     SOCIAL/FAMILY:  [X] updated at bedside     CODE STATUS:  [X] Full Code     DISPOSITION:  [X] ICU     [X] Patient is at high risk of neurologic deterioration/death due to:  CSF leakage and infection     Time seen: 1400  Time spent: 30 critical care minutes

## 2019-11-24 NOTE — CHART NOTE - NSCHARTNOTEFT_GEN_A_CORE
CAPRINI SCORE [CLOT] Score on Admission for     AGE RELATED RISK FACTORS                                                       MOBILITY RELATED FACTORS  [ ] Age 41-60 years                                            (1 Point)                  [ ] Bed rest                                                        (1 Point)  [ x] Age: 61-74 years                                           (2 Points)                 [ ] Plaster cast                                                   (2 Points)  [ ] Age= 75 years                                              (3 Points)                 [ ] Bed bound for more than 72 hours                 (2 Points)    DISEASE RELATED RISK FACTORS                                               GENDER SPECIFIC FACTORS  [ ] Edema in the lower extremities                       (1 Point)                  [ ] Pregnancy                                                     (1 Point)  [ ] Varicose veins                                               (1 Point)                  [ ] Post-partum < 6 weeks                                   (1 Point)             [ ] BMI > 25 Kg/m2                                            (1 Point)                  [ ] Hormonal therapy  or oral contraception          (1 Point)                 [ ] Sepsis (in the previous month)                        (1 Point)                  [ ] History of pregnancy complications                 (1 point)  [ ] Pneumonia or serious lung disease                                               [ ] Unexplained or recurrent                     (1 Point)           (in the previous month)                               (1 Point)  [ ] Abnormal pulmonary function test                     (1 Point)                 SURGERY RELATED RISK FACTORS (include planned surgeries)  [ ] Acute myocardial infarction                              (1 Point)                 [ ]  Section                                             (1 Point)  [ ] Congestive heart failure (in the previous month)  (1 Point)         [ ] Minor surgery                                                  (1 Point)   [ ] Inflammatory bowel disease                             (1 Point)                 [ ] Arthroscopic surgery                                        (2 Points)  [ ] Central venous access                                      (2 Points)                [ ] General surgery lasting more than 45 minutes   (2 Points)       [ ] Stroke (in the previous month)                          (5 Points)               [ ] Elective arthroplasty                                         (5 Points)            [ ] current or past malignancy                              (2 Points)                                                                                                       HEMATOLOGY RELATED FACTORS                                                 TRAUMA RELATED RISK FACTORS  [ ] Prior episodes of VTE                                     (3 Points)                [ ] Fracture of the hip, pelvis, or leg                       (5 Points)  [ ] Positive family history for VTE                         (3 Points)                 [ ] Acute spinal cord injury (in the previous month)  (5 Points)  [ ] Prothrombin 21209 A                                     (3 Points)                 [ ] Paralysis  (less than 1 month)                             (5 Points)  [ ] Factor V Leiden                                             (3 Points)                  [ ] Multiple Trauma within 1 month                        (5 Points)  [ ] Lupus anticoagulants                                     (3 Points)                                                           [ ] Anticardiolipin antibodies                               (3 Points)                                                       [ ] High homocysteine in the blood                      (3 Points)                                             [ ] Other congenital or acquired thrombophilia      (3 Points)                                                [ ] Heparin induced thrombocytopenia                  (3 Points)                                          Total Score [    2      ]    Risk:  Very low 0   Low 1 to 2   Moderate 3 to 4   High =5       VTE Prophylasix Recommednations:  [x ] mechanical pneumatic compression devices                                      [ ] contraindicated: _____________________  [x ] chemo prophylasix                                                                                   [ ] contraindicated _____________________    **** HIGH LIKELIHOOD DVT PRESENT ON ADMISSION  [ ] (please order LE dopplers within 24 hours of admission)

## 2019-11-24 NOTE — CONSULT NOTE ADULT - SUBJECTIVE AND OBJECTIVE BOX
GENERAL SURGERY CONSULT NOTE  Attending: Maia   Service: Green Team surgery   Contact: p [phone]    HPI  64F s/p recent resection of recurrent meningioma c/b leaking from incision site s/p wound revision p/w possible leak from incision site, pillow and hair wet on R side upon waking this morning. Otherwise asymptomatic. No headache, f/c, n/v. Fluid was clear.  Patient denies any fevers or chills.  No neurological symptoms.  Patient was admitted to Neurosurgery service. Underwent placement of lumbar drain with IR today, fluid sent for culture.  Plan to undergo placement of ventriculoperitoneal shunt tomorrow, general surgery consulted to assist with peritoneal placement via laparoscopy.  Patient has a left paramedian incision from a lumbar fusion (likely retroperitoneal approach), history of tubal ligation (transvaginally). On exam no other abdominal scars appreciated.     PMH/PSH  HLD (hyperlipidemia)  Thyroid nodule  OA (osteoarthritis)  Depression with anxiety, s/p ECT     S/P resection of meningioma  S/P thyroidectomy  S/P lumbar spinal fusion      MEDICATIONS  acetaminophen   Tablet .. 325 milliGRAM(s) Oral every 4 hours PRN  ARIPiprazole 2 milliGRAM(s) Oral daily  atorvastatin 40 milliGRAM(s) Oral at bedtime  buPROPion XL . 300 milliGRAM(s) Oral daily  chlorhexidine 4% Liquid 1 Application(s) Topical <User Schedule>  clonazePAM  Tablet 0.5 milliGRAM(s) Oral every 8 hours  folic acid 1 milliGRAM(s) Oral daily  levothyroxine 88 MICROGram(s) Oral daily  ondansetron Injectable 4 milliGRAM(s) IV Push every 6 hours PRN  polyethylene glycol 3350 17 Gram(s) Oral every 12 hours  senna 2 Tablet(s) Oral at bedtime PRN  sodium chloride 0.9% with potassium chloride 20 mEq/L 1000 milliLiter(s) IV Continuous <Continuous>      Allergies    penicillin (Hives)    Intolerances        Social: lives with , no ETOH, nonsmoker     Physical Exam  T(C): 36.6 (11-24-19 @ 19:00), Max: 36.7 (11-24-19 @ 11:00)  HR: 77 (11-24-19 @ 19:00) (70 - 89)  BP: 146/92 (11-24-19 @ 19:00) (117/84 - 146/92)  RR: 22 (11-24-19 @ 19:00) (11 - 22)  SpO2: 95% (11-24-19 @ 19:00) (94% - 100%)  Wt(kg): --  Tmax: T(C): , Max: 36.7 (11-24-19 @ 11:00)  Wt(kg): --    11-24-19 @ 07:01  -  11-24-19 @ 20:01  --------------------------------------------------------  IN: 225 mL / OUT: 15 mL / NET: 210 mL        Gen: NAD  Neuro: AAOx3  HEENT: normocephalic, atraumatic, no scleral icterus  CV: S1, S2, RRR  Pulm: CTA B/L  Abd: Soft, ND, NT, no rebound, no guarding, no palpable organomegaly/masses, Left paramedian scar   Ext: warm, no edema    LABS                        13.3   5.21  )-----------( 253      ( 24 Nov 2019 08:53 )             41.1     11-24    144  |  104  |  22  ----------------------------<  102<H>  4.3   |  27  |  1.16    Ca    9.6      24 Nov 2019 08:53    TPro  6.9  /  Alb  4.2  /  TBili  0.3  /  DBili  x   /  AST  16  /  ALT  18  /  AlkPhos  79  11-24    PT/INR - ( 24 Nov 2019 08:53 )   PT: 11.3 sec;   INR: 0.98 ratio         PTT - ( 24 Nov 2019 08:53 )  PTT:28.2 sec

## 2019-11-24 NOTE — ED ADULT NURSE REASSESSMENT NOTE - NS ED NURSE REASSESS COMMENT FT1
9am: Pt for neuro checks and v/s q1hr, spoke with neurosurgery Dr. Ruben Slaughter,  as per MD neuro checks and v/s to be done on the unit q1hr and not the ER, though it was initiated in the ER. Pt admitted to neuro surgery,  NSCU nurse at pt's bedside in the ED, report given to NSCU nurse Tan

## 2019-11-24 NOTE — H&P ADULT - HISTORY OF PRESENT ILLNESS
64F s/p recent resection of meningioma c/b leaking from incision site s/p wound revision p/w possible leak from incision site, pillow and hair wet on R side this AM, denies possibility of other source of moisture. Otherwise asymptomatic. No headache, f/c, n/v. Fluid was clear

## 2019-11-24 NOTE — CONSULT NOTE ADULT - ASSESSMENT
64F s/p recent resection of recurrent meningioma c/b CSF leak now presents for  shunt placement.     - Dr. Carvalho to assist with add on case tomorrow  - Informed consent obtained

## 2019-11-24 NOTE — PROGRESS NOTE ADULT - SUBJECTIVE AND OBJECTIVE BOX
CLINICAL INDICATION: cranial CSF leak     Patient presents for fluoroscopically guided placement of lumbar drain.  Risks and benefits were discussed with the patient and/or patient health care proxy.  Risks discussed included bleeding, infection, nerve damage, and headache.       Status post lumbar puncture at the L3-L4 level utilizing a 14G spinal needle    8cc of clear  cerebral spinal fluid was obtained.    lumbar drain was then placed.    No complications.

## 2019-11-25 ENCOUNTER — APPOINTMENT (OUTPATIENT)
Dept: PLASTIC SURGERY | Facility: CLINIC | Age: 64
End: 2019-11-25

## 2019-11-25 LAB
APPEARANCE SPUN FLD: COLORLESS — SIGNIFICANT CHANGE UP
GRAM STN FLD: SIGNIFICANT CHANGE UP
HCT VFR BLD CALC: 41.1 % — SIGNIFICANT CHANGE UP (ref 34.5–45)
HGB BLD-MCNC: 13 G/DL — SIGNIFICANT CHANGE UP (ref 11.5–15.5)
MCHC RBC-ENTMCNC: 27.4 PG — SIGNIFICANT CHANGE UP (ref 27–34)
MCHC RBC-ENTMCNC: 31.6 GM/DL — LOW (ref 32–36)
MCV RBC AUTO: 86.5 FL — SIGNIFICANT CHANGE UP (ref 80–100)
NRBC # BLD: 0 /100 WBCS — SIGNIFICANT CHANGE UP (ref 0–0)
PLATELET # BLD AUTO: 260 K/UL — SIGNIFICANT CHANGE UP (ref 150–400)
RBC # BLD: 4.75 M/UL — SIGNIFICANT CHANGE UP (ref 3.8–5.2)
RBC # FLD: 12.8 % — SIGNIFICANT CHANGE UP (ref 10.3–14.5)
SPECIMEN SOURCE: SIGNIFICANT CHANGE UP
WBC # BLD: 8.11 K/UL — SIGNIFICANT CHANGE UP (ref 3.8–10.5)
WBC # FLD AUTO: 8.11 K/UL — SIGNIFICANT CHANGE UP (ref 3.8–10.5)

## 2019-11-25 PROCEDURE — 62272 THER SPI PNXR DRG CSF: CPT

## 2019-11-25 PROCEDURE — 99291 CRITICAL CARE FIRST HOUR: CPT

## 2019-11-25 PROCEDURE — 77003 FLUOROGUIDE FOR SPINE INJECT: CPT | Mod: 26

## 2019-11-25 PROCEDURE — 70450 CT HEAD/BRAIN W/O DYE: CPT | Mod: 26

## 2019-11-25 PROCEDURE — 70553 MRI BRAIN STEM W/O & W/DYE: CPT | Mod: 26

## 2019-11-25 RX ORDER — FENTANYL CITRATE 50 UG/ML
25 INJECTION INTRAVENOUS ONCE
Refills: 0 | Status: DISCONTINUED | OUTPATIENT
Start: 2019-11-25 | End: 2019-11-25

## 2019-11-25 RX ADMIN — Medication 1 MILLIGRAM(S): at 12:03

## 2019-11-25 RX ADMIN — BUPROPION HYDROCHLORIDE 300 MILLIGRAM(S): 150 TABLET, EXTENDED RELEASE ORAL at 12:03

## 2019-11-25 RX ADMIN — FENTANYL CITRATE 25 MICROGRAM(S): 50 INJECTION INTRAVENOUS at 11:00

## 2019-11-25 RX ADMIN — Medication 0.5 MILLIGRAM(S): at 21:23

## 2019-11-25 RX ADMIN — Medication 0.5 MILLIGRAM(S): at 06:07

## 2019-11-25 RX ADMIN — ATORVASTATIN CALCIUM 40 MILLIGRAM(S): 80 TABLET, FILM COATED ORAL at 21:23

## 2019-11-25 RX ADMIN — ARIPIPRAZOLE 2 MILLIGRAM(S): 15 TABLET ORAL at 12:03

## 2019-11-25 RX ADMIN — CHLORHEXIDINE GLUCONATE 1 APPLICATION(S): 213 SOLUTION TOPICAL at 21:23

## 2019-11-25 RX ADMIN — Medication 0.5 MILLIGRAM(S): at 16:58

## 2019-11-25 RX ADMIN — FENTANYL CITRATE 25 MICROGRAM(S): 50 INJECTION INTRAVENOUS at 11:15

## 2019-11-25 RX ADMIN — Medication 88 MICROGRAM(S): at 06:07

## 2019-11-25 NOTE — PROCEDURE NOTE - ADDITIONAL PROCEDURE DETAILS
Repair of small scalp wound dehiscence. Area was prepped with chloroprep and duraprep and draped in sterile fashion. Lidocaine was injected for local anesthesia. The incision was closed with 3-0 nylon suture. The patient tolerated the procedure well.

## 2019-11-25 NOTE — PROGRESS NOTE ADULT - ASSESSMENT
64F s/p recent resection of recurrent meningioma c/b CSF leak now presents for  shunt placement.     - Dr. Carvalho to assist with add on case today    [facility]

## 2019-11-25 NOTE — PROGRESS NOTE ADULT - SUBJECTIVE AND OBJECTIVE BOX
Pt is a 63 yo female hx of anxiety, depression , hypothyroidism , previous UTI, hx of meningioma 9/24/19. Admitted 11/24/19 with report of sheets damp and evaluation suspicious of CSF leak.  Allergy - PCN- hives      No overnight events         ICU Vital Signs Last 24 Hrs  T(C): 36.4 (25 Nov 2019 03:00), Max: 36.7 (24 Nov 2019 11:00)  T(F): 97.6 (25 Nov 2019 03:00), Max: 98.1 (24 Nov 2019 17:00)  HR: 63 (25 Nov 2019 06:00) (63 - 90)  BP: 128/75 (25 Nov 2019 06:00) (109/77 - 146/92)  BP(mean): 91 (25 Nov 2019 06:00) (85 - 108)  RR: 16 (25 Nov 2019 06:00) (11 - 22)  SpO2: 97% (25 Nov 2019 06:00) (93% - 100%)      11-24-19 @ 07:01  -  11-25-19 @ 07:00  --------------------------------------------------------  IN: 1415 mL / OUT: 871 mL / NET: 544 mL        acetaminophen   Tablet .. 325 milliGRAM(s) Oral every 4 hours PRN  ARIPiprazole 2 milliGRAM(s) Oral daily  atorvastatin 40 milliGRAM(s) Oral at bedtime  buPROPion XL . 300 milliGRAM(s) Oral daily  chlorhexidine 4% Liquid 1 Application(s) Topical <User Schedule>  clonazePAM  Tablet 0.5 milliGRAM(s) Oral every 8 hours  folic acid 1 milliGRAM(s) Oral daily  levothyroxine 88 MICROGram(s) Oral daily  ondansetron Injectable 4 milliGRAM(s) IV Push every 6 hours PRN  polyethylene glycol 3350 17 Gram(s) Oral every 12 hours  senna 2 Tablet(s) Oral at bedtime PRN  sodium chloride 0.9%. 1000 milliLiter(s) (75 mL/Hr) IV Continuous <Continuous>                          13.3   5.21  )-----------( 253      ( 24 Nov 2019 08:53 )             41.1     11-24    143  |  104  |  18  ----------------------------<  123<H>  4.1   |  24  |  0.97    Ca    9.6      24 Nov 2019 21:19  Phos  2.7     11-24  Mg     2.1     11-24    TPro  6.9  /  Alb  4.2  /  TBili  0.3  /  DBili  x   /  AST  16  /  ALT  18  /  AlkPhos  79  11-24    LIVER FUNCTIONS - ( 24 Nov 2019 08:53 )  Alb: 4.2 g/dL / Pro: 6.9 g/dL / ALK PHOS: 79 U/L / ALT: 18 U/L / AST: 16 U/L / GGT: x                                     13.3   5.21  )-----------( 253      ( 24 Nov 2019 08:53 )             41.1     11-24    144  |  104  |  22  ----------------------------<  102<H>  4.3   |  27  |  1.16    Ca    9.6      24 Nov 2019 08:53    TPro  6.9  /  Alb  4.2  /  TBili  0.3  /  DBili  x   /  AST  16  /  ALT  18  /  AlkPhos  79  11-24    TSH - 0.06     CT Lumbar Spine No Cont (11.24.19 @ 12:18) >  IMPRESSION:    Status post L4 and L5 total laminectomies. Status post posterior fusion   of L4-S1. Hardware well-placed. No evidence for hardware fracture or   loosening.    Evaluation of spinal canal contents limited, markedly so at the levels of   surgery.    PHYSICAL EXAM:    Constitutional: No Acute Distress     Neurological: Awake, alert oriented to person, place and time, Following Commands, PERRL, EOMI, No Gaze Preference, Face Symmetrical, Speech Fluent, No dysmetria, , No nystagmus     Motor exam:          Upper extremity                         Delt     Bicep     Tricep    HG                                                 R         5/5        5/5        5/5       5/5                                               L          5/5        5/5        5/5       5/5          Lower extremity                        HF         KF        KE       DF         PF                                                  R        5/5        5/5        5/5       5/5         5/5                                               L         5/5        5/5       5/5       5/5          5/5                                                 Sensation: [ X] intact to light touch       Pulmonary: Clear to Auscultation, No rales, No rhonchi, No wheezes     Cardiovascular: S1, S2, Regular rate and rhythm     Gastrointestinal: Soft, Non-tender, Non-distended     Extremities: No calf tenderness     Incision: slight eryhthema at surg site

## 2019-11-25 NOTE — PROGRESS NOTE ADULT - SUBJECTIVE AND OBJECTIVE BOX
NSCU ATTENDING -- ADDITIONAL PROGRESS NOTE    Nighttime rounds were performed -- please refer to earlier Progress Note for HPI details.    T(C): 37.1 (11-25-19 @ 23:00), Max: 37.1 (11-25-19 @ 23:00)  HR: 85 (11-25-19 @ 23:00) (63 - 104)  BP: 113/75 (11-25-19 @ 23:00) (109/77 - 144/70)  RR: 17 (11-25-19 @ 23:00) (12 - 23)  SpO2: 93% (11-25-19 @ 23:00) (93% - 100%)  Wt(kg): --    Relevant labwork and imaging reviewed.    No imminent OR plan.  LD @ 5 cc/hr.  Neurologically intact.

## 2019-11-25 NOTE — PROGRESS NOTE ADULT - SUBJECTIVE AND OBJECTIVE BOX
The patient reported clear fluid draining from her scalp early yesterday morning. She could not pinpoint the area beyond saying it was on the top of her head. The leak was corroborated by her . SHe is two months after craniotomy and resection of meningioma, and a month after revision of the incision, which had a central dehiscence. She was seen about two weeks ago as an outpatient, and the incision appeared well-healed (also seen by Dr. Banks, the plastic surgeon who worked on the scalp revision).   No leak was seen in the ER. Lumbar drain placed by NR (Dr. Newton) to prevent ongoing leak and possible meningitis.   The patient has remained neurologically intact. Plan was for placement of  shunt on the assumption that leak reflected increased ICP from superior sagittal sinus resection.   CT this AM showed increased pneumocephalus. This raises the question of ongoing communication outside the scalp. Debridement of a small scab suggests there could again be a small opening at the "T" of the incision.    Plan: Cancel  shunt; reinforce the incision with nylon sutures and dermabond; resume lumbar drainage, repeat CT, observe for need for shunt.

## 2019-11-25 NOTE — PROGRESS NOTE ADULT - ASSESSMENT
ASSESSMENT/PLAN: S/P meningioma resection 9/19 , CSF leakage     NEURO:  neuro checks q 1hr   LD this afternoon- CSF to be sent for cell count, protein , glucose and culture  CSF q 1 hr to be removed  Resume meds - home   VPS in am   Activity: [] OOB as tolerated [] Bedrest [] PT [] OT [] PMNR [] Bed in Chair Position     PULM:  Incentive gianfranco  Maintain Sats > 92 %    CV:  SBP goal- 100- < 150  NS at 70 cc/hr     RENAL:      GI: NPO after MN   Diet: regular before MN   GI prophylaxis [X] not indicated  Bowel regimen [] colace [X] senna [] other:    ENDO:   Goal euglycemia (-180)    HEME/ONC:  VTE prophylaxis: [X] SCDs [] chemoprophylaxis  Doppler LE     ID: Afebrile     SOCIAL/FAMILY:  [X] updated at bedside     CODE STATUS:  [X] Full Code     DISPOSITION:  [X] ICU     [X] Patient is at high risk of neurologic deterioration/death due to:  CSF leakage and infection     Time seen: 1400  Time spent: 30 critical care minutes

## 2019-11-25 NOTE — PROGRESS NOTE ADULT - SUBJECTIVE AND OBJECTIVE BOX
GENERAL SURGERY CONSULT PROGRESS NOTE    SUBJECTIVE  Patient seen and examined. No acute events overnight. Reports tolerating diet without nausea, vomiting, passing flatus, having bowel movements, voiding without issues, have been ambulating and out of bed. Denies fever, chills, SOB, chest pain.     says no abdominal surgery, hx of anterior approach spinal fusion    OBJECTIVE    PHYSICAL EXAM  General: Appears well, NAD  CHEST: breathing comfortably  CV: appears well perfused  Abdomen: soft, nontender, nondistended, no rebound or guarding  Extremities: Grossly symmetric    T(C): 36.4 (11-25-19 @ 03:00), Max: 36.7 (11-24-19 @ 11:00)  HR: 75 (11-25-19 @ 07:00) (63 - 90)  BP: 144/70 (11-25-19 @ 07:00) (109/77 - 146/92)  RR: 15 (11-25-19 @ 07:00) (11 - 22)  SpO2: 98% (11-25-19 @ 07:00) (93% - 100%)    11-24-19 @ 07:01  -  11-25-19 @ 07:00  --------------------------------------------------------  IN: 1415 mL / OUT: 871 mL / NET: 544 mL        MEDICATIONS  acetaminophen   Tablet .. 325 milliGRAM(s) Oral every 4 hours PRN  ARIPiprazole 2 milliGRAM(s) Oral daily  atorvastatin 40 milliGRAM(s) Oral at bedtime  buPROPion XL . 300 milliGRAM(s) Oral daily  chlorhexidine 4% Liquid 1 Application(s) Topical <User Schedule>  clonazePAM  Tablet 0.5 milliGRAM(s) Oral every 8 hours  folic acid 1 milliGRAM(s) Oral daily  levothyroxine 88 MICROGram(s) Oral daily  ondansetron Injectable 4 milliGRAM(s) IV Push every 6 hours PRN  polyethylene glycol 3350 17 Gram(s) Oral every 12 hours  senna 2 Tablet(s) Oral at bedtime PRN  sodium chloride 0.9%. 1000 milliLiter(s) IV Continuous <Continuous>      LABS                        13.3   5.21  )-----------( 253      ( 24 Nov 2019 08:53 )             41.1     11-24    143  |  104  |  18  ----------------------------<  123<H>  4.1   |  24  |  0.97    Ca    9.6      24 Nov 2019 21:19  Phos  2.7     11-24  Mg     2.1     11-24    TPro  6.9  /  Alb  4.2  /  TBili  0.3  /  DBili  x   /  AST  16  /  ALT  18  /  AlkPhos  79  11-24    PT/INR - ( 24 Nov 2019 08:53 )   PT: 11.3 sec;   INR: 0.98 ratio         PTT - ( 24 Nov 2019 08:53 )  PTT:28.2 sec      RADIOLOGY & ADDITIONAL STUDIES

## 2019-11-26 LAB
ANION GAP SERPL CALC-SCNC: 10 MMOL/L — SIGNIFICANT CHANGE UP (ref 5–17)
BUN SERPL-MCNC: 18 MG/DL — SIGNIFICANT CHANGE UP (ref 7–23)
CALCIUM SERPL-MCNC: 9.4 MG/DL — SIGNIFICANT CHANGE UP (ref 8.4–10.5)
CHLORIDE SERPL-SCNC: 109 MMOL/L — HIGH (ref 96–108)
CO2 SERPL-SCNC: 23 MMOL/L — SIGNIFICANT CHANGE UP (ref 22–31)
CREAT SERPL-MCNC: 1.02 MG/DL — SIGNIFICANT CHANGE UP (ref 0.5–1.3)
GLUCOSE SERPL-MCNC: 103 MG/DL — HIGH (ref 70–99)
MAGNESIUM SERPL-MCNC: 2 MG/DL — SIGNIFICANT CHANGE UP (ref 1.6–2.6)
PHOSPHATE SERPL-MCNC: 3.2 MG/DL — SIGNIFICANT CHANGE UP (ref 2.5–4.5)
POTASSIUM SERPL-MCNC: 4 MMOL/L — SIGNIFICANT CHANGE UP (ref 3.5–5.3)
POTASSIUM SERPL-SCNC: 4 MMOL/L — SIGNIFICANT CHANGE UP (ref 3.5–5.3)
SODIUM SERPL-SCNC: 142 MMOL/L — SIGNIFICANT CHANGE UP (ref 135–145)

## 2019-11-26 PROCEDURE — 70450 CT HEAD/BRAIN W/O DYE: CPT | Mod: 26

## 2019-11-26 PROCEDURE — 99291 CRITICAL CARE FIRST HOUR: CPT

## 2019-11-26 RX ORDER — ENOXAPARIN SODIUM 100 MG/ML
40 INJECTION SUBCUTANEOUS
Refills: 0 | Status: DISCONTINUED | OUTPATIENT
Start: 2019-11-26 | End: 2019-11-28

## 2019-11-26 RX ORDER — ACETAMINOPHEN 500 MG
650 TABLET ORAL EVERY 6 HOURS
Refills: 0 | Status: DISCONTINUED | OUTPATIENT
Start: 2019-11-26 | End: 2019-11-29

## 2019-11-26 RX ADMIN — ATORVASTATIN CALCIUM 40 MILLIGRAM(S): 80 TABLET, FILM COATED ORAL at 21:45

## 2019-11-26 RX ADMIN — Medication 0.5 MILLIGRAM(S): at 05:12

## 2019-11-26 RX ADMIN — Medication 0.5 MILLIGRAM(S): at 21:45

## 2019-11-26 RX ADMIN — BUPROPION HYDROCHLORIDE 300 MILLIGRAM(S): 150 TABLET, EXTENDED RELEASE ORAL at 11:18

## 2019-11-26 RX ADMIN — Medication 88 MICROGRAM(S): at 05:12

## 2019-11-26 RX ADMIN — CHLORHEXIDINE GLUCONATE 1 APPLICATION(S): 213 SOLUTION TOPICAL at 21:45

## 2019-11-26 RX ADMIN — ARIPIPRAZOLE 2 MILLIGRAM(S): 15 TABLET ORAL at 11:19

## 2019-11-26 RX ADMIN — Medication 0.5 MILLIGRAM(S): at 13:38

## 2019-11-26 RX ADMIN — Medication 325 MILLIGRAM(S): at 11:52

## 2019-11-26 RX ADMIN — Medication 1 MILLIGRAM(S): at 11:19

## 2019-11-26 RX ADMIN — Medication 325 MILLIGRAM(S): at 12:22

## 2019-11-26 RX ADMIN — ENOXAPARIN SODIUM 40 MILLIGRAM(S): 100 INJECTION SUBCUTANEOUS at 17:04

## 2019-11-26 NOTE — PROGRESS NOTE ADULT - ASSESSMENT
64F s/p recent resection of recurrent meningioma c/b CSF leak now presents for  shunt placement.     - Dr. Carvalho to assist with case today    [facility] 64F s/p recent resection of recurrent meningioma c/b CSF leak now presents for  shunt placement.     -please reach out to surgery team for  shunt placement when deemed appropriate by primary team  -appreciate NSCU care    [facility]

## 2019-11-26 NOTE — PROGRESS NOTE ADULT - ASSESSMENT
s/p resection of meningioma c/p CSF leak with wound revision, with continued leakage     -  Incision re-enforced with nylon  - LD@5cc/hr   - CTH in AM

## 2019-11-26 NOTE — PROGRESS NOTE ADULT - SUBJECTIVE AND OBJECTIVE BOX
Pt is a 65 yo female hx of anxiety, depression , hypothyroidism , previous UTI, hx of meningioma 9/24/19. Admitted 11/24/19 with report of sheets damp and evaluation suspicious of CSF leak.  Allergy - PCN- hives      No overnight events             ICU Vital Signs Last 24 Hrs  T(C): 37 (26 Nov 2019 03:00), Max: 37.1 (25 Nov 2019 23:00)  T(F): 98.6 (26 Nov 2019 03:00), Max: 98.8 (25 Nov 2019 23:00)  HR: 78 (26 Nov 2019 07:00) (66 - 104)  BP: 121/75 (26 Nov 2019 07:00) (101/74 - 144/70)  BP(mean): 90 (26 Nov 2019 07:00) (80 - 105)  RR: 16 (26 Nov 2019 07:00) (12 - 23)  SpO2: 98% (26 Nov 2019 07:00) (92% - 100%)      11-25-19 @ 07:01  -  11-26-19 @ 07:00  --------------------------------------------------------  IN: 615 mL / OUT: 1115 mL / NET: -500 mL        acetaminophen   Tablet .. 325 milliGRAM(s) Oral every 4 hours PRN  ARIPiprazole 2 milliGRAM(s) Oral daily  atorvastatin 40 milliGRAM(s) Oral at bedtime  buPROPion XL . 300 milliGRAM(s) Oral daily  chlorhexidine 4% Liquid 1 Application(s) Topical <User Schedule>  clonazePAM  Tablet 0.5 milliGRAM(s) Oral every 8 hours  folic acid 1 milliGRAM(s) Oral daily  levothyroxine 88 MICROGram(s) Oral daily  ondansetron Injectable 4 milliGRAM(s) IV Push every 6 hours PRN  polyethylene glycol 3350 17 Gram(s) Oral every 12 hours  senna 2 Tablet(s) Oral at bedtime PRN                          13.0   8.11  )-----------( 260      ( 25 Nov 2019 22:47 )             41.1     11-25    142  |  109<H>  |  18  ----------------------------<  103<H>  4.0   |  23  |  1.02    Ca    9.4      25 Nov 2019 22:47  Phos  3.2     11-25  Mg     2.0     11-25    TPro  6.9  /  Alb  4.2  /  TBili  0.3  /  DBili  x   /  AST  16  /  ALT  18  /  AlkPhos  79  11-24    LIVER FUNCTIONS - ( 24 Nov 2019 08:53 )  Alb: 4.2 g/dL / Pro: 6.9 g/dL / ALK PHOS: 79 U/L / ALT: 18 U/L / AST: 16 U/L / GGT: x               Culture - CSF with Gram Stain (collected 11-25-19 @ 17:56)  Source: .CSF CSF  Gram Stain (11-25-19 @ 19:35):    polymorphonuclear leukocytes seen    No organisms seen    by cytocentrifuge                              13.3   5.21  )-----------( 253      ( 24 Nov 2019 08:53 )             41.1     11-24    143  |  104  |  18  ----------------------------<  123<H>  4.1   |  24  |  0.97    Ca    9.6      24 Nov 2019 21:19  Phos  2.7     11-24  Mg     2.1     11-24    TPro  6.9  /  Alb  4.2  /  TBili  0.3  /  DBili  x   /  AST  16  /  ALT  18  /  AlkPhos  79  11-24    LIVER FUNCTIONS - ( 24 Nov 2019 08:53 )  Alb: 4.2 g/dL / Pro: 6.9 g/dL / ALK PHOS: 79 U/L / ALT: 18 U/L / AST: 16 U/L / GGT: x               PHYSICAL EXAM:    Constitutional: No Acute Distress     Neurological: Awake, alert oriented to person, place and time, Following Commands, PERRL, EOMI, No Gaze Preference, Face Symmetrical, Speech Fluent, No dysmetria, , No nystagmus     Motor exam:          Upper extremity                         Delt     Bicep     Tricep    HG                                                 R         5/5        5/5        5/5       5/5                                               L          5/5        5/5        5/5       5/5          Lower extremity                        HF         KF        KE       DF         PF                                                  R        5/5        5/5        5/5       5/5         5/5                                               L         5/5 5/5 5/5 5/5 5/5                                                 Sensation: [ X] intact to light touch       Pulmonary: Clear to Auscultation, No rales, No rhonchi, No wheezes     Cardiovascular: S1, S2, Regular rate and rhythm     Gastrointestinal: Soft, Non-tender, Non-distended     Extremities: No calf tenderness     Incision: slight eryhthema at surg site Pt is a 65 yo female hx of anxiety, depression , hypothyroidism , previous UTI, hx of meningioma 9/24/19. Admitted 11/24/19 with report of sheets damp and evaluation suspicious of CSF leak.  Allergy - PCN- hives      No overnight events       ICU Vital Signs Last 24 Hrs  T(C): 37 (26 Nov 2019 03:00), Max: 37.1 (25 Nov 2019 23:00)  T(F): 98.6 (26 Nov 2019 03:00), Max: 98.8 (25 Nov 2019 23:00)  HR: 78 (26 Nov 2019 07:00) (66 - 104)  BP: 121/75 (26 Nov 2019 07:00) (101/74 - 144/70)  BP(mean): 90 (26 Nov 2019 07:00) (80 - 105)  RR: 16 (26 Nov 2019 07:00) (12 - 23)  SpO2: 98% (26 Nov 2019 07:00) (92% - 100%)      11-25-19 @ 07:01  -  11-26-19 @ 07:00  --------------------------------------------------------  IN: 615 mL / OUT: 1115 mL / NET: -500 mL        acetaminophen   Tablet .. 325 milliGRAM(s) Oral every 4 hours PRN  ARIPiprazole 2 milliGRAM(s) Oral daily  atorvastatin 40 milliGRAM(s) Oral at bedtime  buPROPion XL . 300 milliGRAM(s) Oral daily  chlorhexidine 4% Liquid 1 Application(s) Topical <User Schedule>  clonazePAM  Tablet 0.5 milliGRAM(s) Oral every 8 hours  folic acid 1 milliGRAM(s) Oral daily  levothyroxine 88 MICROGram(s) Oral daily  ondansetron Injectable 4 milliGRAM(s) IV Push every 6 hours PRN  polyethylene glycol 3350 17 Gram(s) Oral every 12 hours  senna 2 Tablet(s) Oral at bedtime PRN                          13.0   8.11  )-----------( 260      ( 25 Nov 2019 22:47 )             41.1     11-25    142  |  109<H>  |  18  ----------------------------<  103<H>  4.0   |  23  |  1.02    Ca    9.4      25 Nov 2019 22:47  Phos  3.2     11-25  Mg     2.0     11-25    TPro  6.9  /  Alb  4.2  /  TBili  0.3  /  DBili  x   /  AST  16  /  ALT  18  /  AlkPhos  79  11-24    LIVER FUNCTIONS - ( 24 Nov 2019 08:53 )  Alb: 4.2 g/dL / Pro: 6.9 g/dL / ALK PHOS: 79 U/L / ALT: 18 U/L / AST: 16 U/L / GGT: x               Culture - CSF with Gram Stain (collected 11-25-19 @ 17:56)  Source: .CSF CSF  Gram Stain (11-25-19 @ 19:35):    polymorphonuclear leukocytes seen    No organisms seen    by cytocentrifuge                              13.3   5.21  )-----------( 253      ( 24 Nov 2019 08:53 )             41.1     11-24    143  |  104  |  18  ----------------------------<  123<H>  4.1   |  24  |  0.97    Ca    9.6      24 Nov 2019 21:19  Phos  2.7     11-24  Mg     2.1     11-24    TPro  6.9  /  Alb  4.2  /  TBili  0.3  /  DBili  x   /  AST  16  /  ALT  18  /  AlkPhos  79  11-24    LIVER FUNCTIONS - ( 24 Nov 2019 08:53 )  Alb: 4.2 g/dL / Pro: 6.9 g/dL / ALK PHOS: 79 U/L / ALT: 18 U/L / AST: 16 U/L / GGT: x               PHYSICAL EXAM:    Constitutional: No Acute Distress     Neurological: Awake, alert oriented to person, place and time, Following Commands, PERRL, EOMI, No Gaze Preference, Face Symmetrical, Speech Fluent, No dysmetria, , No nystagmus     Motor exam:          Upper extremity                         Delt     Bicep     Tricep    HG                                                 R         5/5        5/5        5/5       5/5                                               L          5/5        5/5        5/5       5/5          Lower extremity                        HF         KF        KE       DF         PF                                                  R        5/5        5/5        5/5       5/5         5/5                                               L         5/5 5/5 5/5 5/5 5/5                                                 Sensation: [ X] intact to light touch       Pulmonary: Clear to Auscultation, No rales, No rhonchi, No wheezes     Cardiovascular: S1, S2, Regular rate and rhythm     Gastrointestinal: Soft, Non-tender, Non-distended     Extremities: No calf tenderness     Incision: slight eryhthema at surg site

## 2019-11-26 NOTE — PROGRESS NOTE ADULT - SUBJECTIVE AND OBJECTIVE BOX
Patient seen and examined at bedside.    --Anticoagulation--    T(C): 37 (11-26-19 @ 03:00), Max: 37.1 (11-25-19 @ 23:00)  HR: 71 (11-26-19 @ 04:00) (63 - 104)  BP: 108/79 (11-26-19 @ 04:00) (101/74 - 144/70)  RR: 14 (11-26-19 @ 04:00) (12 - 23)  SpO2: 93% (11-26-19 @ 04:00) (93% - 100%)  Wt(kg): --    Exam:  AOx3, FC, PERRL, EOMI, no facial   5/5 throughout, no drift  SILT  no clonus

## 2019-11-26 NOTE — DIETITIAN INITIAL EVALUATION ADULT. - ENERGY NEEDS
Ht:  64" Wt: 169.6 lbs  BMI: 29.2 kg/m2   IBW: 120 lbs (+/-10%)    141 % IBW  Edema: none present          Skin: surgical incision cranioplasty 9/2019; lumbar drain

## 2019-11-26 NOTE — PROGRESS NOTE ADULT - ASSESSMENT
ASSESSMENT/PLAN: S/P meningioma resection 9/19 , CSF leakage     NEURO:  neuro checks q 1hr   LD this afternoon- CSF to be sent for cell count, protein , glucose and culture  CSF q 1 hr to be removed  Resume meds - home   VPS in am   Activity: [] OOB as tolerated [] Bedrest [] PT [] OT [] PMNR [] Bed in Chair Position     PULM:  Incentive gianfranco  Maintain Sats > 92 %    CV:  SBP goal- 100- < 150  NS at 70 cc/hr     RENAL:      GI: NPO after MN   Diet: regular before MN   GI prophylaxis [X] not indicated  Bowel regimen [] colace [X] senna [] other:    ENDO:   Goal euglycemia (-180)    HEME/ONC:  VTE prophylaxis: [X] SCDs [] chemoprophylaxis  Doppler LE     ID: Afebrile     SOCIAL/FAMILY:  [X] updated at bedside     CODE STATUS:  [X] Full Code     DISPOSITION:  [X] ICU     [X] Patient is at high risk of neurologic deterioration/death due to:  CSF leakage and infection     Time seen: 1400  Time spent: 30 critical care minutes ASSESSMENT/PLAN: S/P meningioma resection 9/19 , CSF leakage     NEURO:  neuro checks q 1hr   LD this afternoon- CSF to be sent for cell count, protein , glucose and culture  CSF q 1 hr to be removed  Resume meds - home   VPS in am   Activity: [] OOB as tolerated [] Bedrest [] PT [] OT [] PMNR [] Bed in Chair Position     PULM:  Incentive gianfranco  Maintain Sats > 92 %    CV:  SBP goal- 100- < 150  NS at 70 cc/hr     RENAL:      GI:   Diet: regular diet   GI prophylaxis [X] not indicated  Bowel regimen [] colace [X] senna [] other:    ENDO:   Goal euglycemia (-180)    HEME/ONC:  VTE prophylaxis: [X] SCDs [] chemoprophylaxis  Doppler LE     ID: Afebrile     SOCIAL/FAMILY:  [X] updated at bedside     CODE STATUS:  [X] Full Code     DISPOSITION:  [X] ICU     [X] Patient is at high risk of neurologic deterioration/death due to:  CSF leakage and infection     Time seen: 1400  Time spent: 30 critical care minutes ASSESSMENT/PLAN: S/P meningioma resection 9/19 , CSF leakage     NEURO:  neuro checks q 1hr   CSF 5 cc q 1 hr to be removed  Resume meds - home   VPS on Friday   Activity: [] OOB as tolerated [] Bedrest [] PT [] OT [] PMNR [] Bed in Chair Position     PULM:  Incentive gianfranco  Maintain Sats > 92 %    CV:  SBP goal- 100- < 150      RENAL: IVL       GI:   Diet: regular diet   GI prophylaxis [X] not indicated  Bowel regimen [] colace [X] senna [] other:    ENDO:   Goal euglycemia (-180)    HEME/ONC:  VTE prophylaxis: [X] SCDs [] chemoprophylaxis start Lovenox   Doppler LE     ID: Afebrile     SOCIAL/FAMILY:  [X] updated at bedside     CODE STATUS:  [X] Full Code     DISPOSITION:  [X] ICU     [X] Patient is at high risk of neurologic deterioration/death due to:  CSF leakage and infection     Time spent: 30 critical care minutes

## 2019-11-26 NOTE — DIETITIAN INITIAL EVALUATION ADULT. - ADD RECOMMEND
1) Continue regular diet with no therapeutic restrictions. Defer consistency to medical team, SLP. 2) Encourage and monitor PO intake. Encourage use of daily menus. Encourage pt to focus on protein foods first and to order nutrient dense foods with meals to save for in-between meals to mimic smaller, meal frequent meals. To note, pt reports family will be bringing in most meals from outside. 3) Monitor wt trends, nutrition related labs, skin integrity, hydration status, bowel regularity.

## 2019-11-26 NOTE — PROGRESS NOTE ADULT - SUBJECTIVE AND OBJECTIVE BOX
GENERAL SURGERY CONSULT PROGRESS NOTE    SUBJECTIVE  Patient seen and examined. No acute events overnight. Reports tolerating diet without nausea, vomiting, passing flatus, having bowel movements, voiding without issues, have been ambulating and out of bed. Denies fever, chills, SOB, chest pain.     says no abdominal surgery, hx of anterior approach spinal fusion    OBJECTIVE    PHYSICAL EXAM  General: Appears well, NAD  CHEST: breathing comfortably  CV: appears well perfused  Abdomen: soft, nontender, nondistended, no rebound or guarding  Extremities: Grossly symmetric                Vital Signs:  Vital Signs Last 24 Hrs  T(C): 37 (26 Nov 2019 03:00), Max: 37.1 (25 Nov 2019 23:00)  T(F): 98.6 (26 Nov 2019 03:00), Max: 98.8 (25 Nov 2019 23:00)  HR: 71 (26 Nov 2019 04:00) (63 - 104)  BP: 108/79 (26 Nov 2019 04:00) (101/74 - 144/70)  BP(mean): 90 (26 Nov 2019 04:00) (80 - 105)  RR: 14 (26 Nov 2019 04:00) (12 - 23)  SpO2: 93% (26 Nov 2019 04:00) (93% - 100%)    CAPILLARY BLOOD GLUCOSE          I&O's Detail    24 Nov 2019 07:01  -  25 Nov 2019 07:00  --------------------------------------------------------  IN:    Oral Fluid: 290 mL    sodium chloride 0.9%: 600 mL    sodium chloride 0.9% with potassium chloride 20 mEq/L: 525 mL  Total IN: 1415 mL    OUT:    Drain: 75 mL    Voided: 801 mL  Total OUT: 876 mL    Total NET: 539 mL      25 Nov 2019 07:01  -  26 Nov 2019 04:19  --------------------------------------------------------  IN:    Oral Fluid: 240 mL    sodium chloride 0.9%: 375 mL  Total IN: 615 mL    OUT:    Drain: 100 mL    Voided: 800 mL  Total OUT: 900 mL    Total NET: -285 mL          MEDICATIONS  (STANDING):  ARIPiprazole 2 milliGRAM(s) Oral daily  atorvastatin 40 milliGRAM(s) Oral at bedtime  buPROPion XL . 300 milliGRAM(s) Oral daily  chlorhexidine 4% Liquid 1 Application(s) Topical <User Schedule>  clonazePAM  Tablet 0.5 milliGRAM(s) Oral every 8 hours  folic acid 1 milliGRAM(s) Oral daily  levothyroxine 88 MICROGram(s) Oral daily  polyethylene glycol 3350 17 Gram(s) Oral every 12 hours    MEDICATIONS  (PRN):  acetaminophen   Tablet .. 325 milliGRAM(s) Oral every 4 hours PRN Temp greater or equal to 38C (100.4F), Mild Pain (1 - 3)  ondansetron Injectable 4 milliGRAM(s) IV Push every 6 hours PRN Nausea and/or Vomiting  senna 2 Tablet(s) Oral at bedtime PRN Constipation          Labs:    11-25    142  |  109<H>  |  18  ----------------------------<  103<H>  4.0   |  23  |  1.02    Ca    9.4      25 Nov 2019 22:47  Phos  3.2     11-25  Mg     2.0     11-25    TPro  6.9  /  Alb  4.2  /  TBili  0.3  /  DBili  x   /  AST  16  /  ALT  18  /  AlkPhos  79  11-24    LIVER FUNCTIONS - ( 24 Nov 2019 08:53 )  Alb: 4.2 g/dL / Pro: 6.9 g/dL / ALK PHOS: 79 U/L / ALT: 18 U/L / AST: 16 U/L / GGT: x                                 13.0   8.11  )-----------( 260      ( 25 Nov 2019 22:47 )             41.1     PT/INR - ( 24 Nov 2019 08:53 )   PT: 11.3 sec;   INR: 0.98 ratio         PTT - ( 24 Nov 2019 08:53 )  PTT:28.2 sec    Imaging: GENERAL SURGERY CONSULT PROGRESS NOTE    SUBJECTIVE  Patient seen and examined. No acute events overnight. Reports tolerating diet without nausea, vomiting, passing flatus, having bowel movement yesterday,, voiding without issues, have been ambulating and out of bed. Denies fever, chills, SOB, chest pain.     says no abdominal surgery, hx of anterior approach spinal fusion    11/25  shunt cancelled per nsgy, nylon sutures placed on scalp bedside    OBJECTIVE    PHYSICAL EXAM  General: Appears well, NAD  CHEST: breathing comfortably  CV: appears well perfused  Abdomen: soft, nontender, nondistended, no rebound or guarding  Extremities: Grossly symmetric                Vital Signs:  Vital Signs Last 24 Hrs  T(C): 37 (26 Nov 2019 03:00), Max: 37.1 (25 Nov 2019 23:00)  T(F): 98.6 (26 Nov 2019 03:00), Max: 98.8 (25 Nov 2019 23:00)  HR: 71 (26 Nov 2019 04:00) (63 - 104)  BP: 108/79 (26 Nov 2019 04:00) (101/74 - 144/70)  BP(mean): 90 (26 Nov 2019 04:00) (80 - 105)  RR: 14 (26 Nov 2019 04:00) (12 - 23)  SpO2: 93% (26 Nov 2019 04:00) (93% - 100%)    CAPILLARY BLOOD GLUCOSE          I&O's Detail    24 Nov 2019 07:01  -  25 Nov 2019 07:00  --------------------------------------------------------  IN:    Oral Fluid: 290 mL    sodium chloride 0.9%: 600 mL    sodium chloride 0.9% with potassium chloride 20 mEq/L: 525 mL  Total IN: 1415 mL    OUT:    Drain: 75 mL    Voided: 801 mL  Total OUT: 876 mL    Total NET: 539 mL      25 Nov 2019 07:01  -  26 Nov 2019 04:19  --------------------------------------------------------  IN:    Oral Fluid: 240 mL    sodium chloride 0.9%: 375 mL  Total IN: 615 mL    OUT:    Drain: 100 mL    Voided: 800 mL  Total OUT: 900 mL    Total NET: -285 mL          MEDICATIONS  (STANDING):  ARIPiprazole 2 milliGRAM(s) Oral daily  atorvastatin 40 milliGRAM(s) Oral at bedtime  buPROPion XL . 300 milliGRAM(s) Oral daily  chlorhexidine 4% Liquid 1 Application(s) Topical <User Schedule>  clonazePAM  Tablet 0.5 milliGRAM(s) Oral every 8 hours  folic acid 1 milliGRAM(s) Oral daily  levothyroxine 88 MICROGram(s) Oral daily  polyethylene glycol 3350 17 Gram(s) Oral every 12 hours    MEDICATIONS  (PRN):  acetaminophen   Tablet .. 325 milliGRAM(s) Oral every 4 hours PRN Temp greater or equal to 38C (100.4F), Mild Pain (1 - 3)  ondansetron Injectable 4 milliGRAM(s) IV Push every 6 hours PRN Nausea and/or Vomiting  senna 2 Tablet(s) Oral at bedtime PRN Constipation          Labs:    11-25    142  |  109<H>  |  18  ----------------------------<  103<H>  4.0   |  23  |  1.02    Ca    9.4      25 Nov 2019 22:47  Phos  3.2     11-25  Mg     2.0     11-25    TPro  6.9  /  Alb  4.2  /  TBili  0.3  /  DBili  x   /  AST  16  /  ALT  18  /  AlkPhos  79  11-24    LIVER FUNCTIONS - ( 24 Nov 2019 08:53 )  Alb: 4.2 g/dL / Pro: 6.9 g/dL / ALK PHOS: 79 U/L / ALT: 18 U/L / AST: 16 U/L / GGT: x                                 13.0   8.11  )-----------( 260      ( 25 Nov 2019 22:47 )             41.1     PT/INR - ( 24 Nov 2019 08:53 )   PT: 11.3 sec;   INR: 0.98 ratio         PTT - ( 24 Nov 2019 08:53 )  PTT:28.2 sec    Imaging:

## 2019-11-26 NOTE — PROGRESS NOTE ADULT - SUBJECTIVE AND OBJECTIVE BOX
NSCU ATTENDING -- ADDITIONAL PROGRESS NOTE    Nighttime rounds were performed -- please refer to earlier Progress Note for HPI details.    T(C): 36.8 (11-26-19 @ 23:00), Max: 37 (11-26-19 @ 03:00)  HR: 85 (11-26-19 @ 23:00) (66 - 99)  BP: 111/76 (11-26-19 @ 23:00) (101/74 - 138/82)  RR: 20 (11-26-19 @ 23:00) (9 - 29)  SpO2: 94% (11-26-19 @ 23:00) (92% - 99%)  Wt(kg): --    Relevant labwork and imaging reviewed.    Intact.  Awaiting shunt planning.  Lab holiday and will downgrade to q2h neuro checks.

## 2019-11-26 NOTE — DIETITIAN INITIAL EVALUATION ADULT. - OTHER INFO
Pt is a 65 yo F with PMH: anxiety, depression, hypothyroidism, previous UTI, hx of meningioma 9/24/19. Admitted on 11/24/19 with reports of sheets damp and evaluation suspicious of CSF leak. Per review of MD note, plan for possible lumbar drain. Pt is a 63 yo F with PMH: anxiety, depression, hypothyroidism, previous UTI, hx of meningioma 9/24/19. Admitted on 11/24/19 with reports of sheets damp and evaluation suspicious of CSF leak. Per review of MD note, plan for possible lumbar drain.    Per discussion with pt, reports appetite has not been the same since her surgery of meningioma in 9/2019. Expressed consuming on average 2 meals daily (lunch and dinner) and states "I eat because I know I have to". Dietary preferences obtained for no pork; otherwise eats most foods. States dislike of institutionalized foods, and reports family will be bringing in most meals. To note, pt reports she consumed 1/2 bagel with turkey [place], egg whites, American cheese and fruits brought in by daughter. Denies intolerance to chewing/swallowing, denies food allergies. Reports taking daily Folic acid PTA. Denies N/V/C/diarrhea.     Pt reports UBW ~178 lbs. Endorsed ~8 lbs wt loss since 9/2019 in context of decreased overall appetite/PO intake r/t surgery. Dosing wt (11/25): 169.6 lbs. Appears consistent with reported wt. Will monitor. Overall, noted with 8.4 lbs/4.7% wt loss x ~2 months.     Pt prescribed regular diet (no therapeutic restrictions). Good intake of breakfast this AM brought in by family. RD encouraged use of daily menus, encouraged protein intake to support maintenance of muscle mass/wound healing s/p pending surgery. Made pt aware of RD available daily if further questions/concerns arise.     Last BM 11/26 x 1. On bowel regimen as ordered.

## 2019-11-27 PROCEDURE — 99233 SBSQ HOSP IP/OBS HIGH 50: CPT

## 2019-11-27 PROCEDURE — 70450 CT HEAD/BRAIN W/O DYE: CPT | Mod: 26

## 2019-11-27 RX ADMIN — BUPROPION HYDROCHLORIDE 300 MILLIGRAM(S): 150 TABLET, EXTENDED RELEASE ORAL at 11:39

## 2019-11-27 RX ADMIN — CHLORHEXIDINE GLUCONATE 1 APPLICATION(S): 213 SOLUTION TOPICAL at 21:49

## 2019-11-27 RX ADMIN — ENOXAPARIN SODIUM 40 MILLIGRAM(S): 100 INJECTION SUBCUTANEOUS at 17:32

## 2019-11-27 RX ADMIN — Medication 88 MICROGRAM(S): at 05:06

## 2019-11-27 RX ADMIN — ATORVASTATIN CALCIUM 40 MILLIGRAM(S): 80 TABLET, FILM COATED ORAL at 21:48

## 2019-11-27 RX ADMIN — Medication 0.5 MILLIGRAM(S): at 21:49

## 2019-11-27 RX ADMIN — ARIPIPRAZOLE 2 MILLIGRAM(S): 15 TABLET ORAL at 11:39

## 2019-11-27 RX ADMIN — Medication 0.5 MILLIGRAM(S): at 13:17

## 2019-11-27 RX ADMIN — Medication 1 MILLIGRAM(S): at 11:39

## 2019-11-27 RX ADMIN — Medication 0.5 MILLIGRAM(S): at 05:06

## 2019-11-27 NOTE — CONSULT NOTE ADULT - ASSESSMENT
64F s/p recent resection of recurrent meningioma c/b CSF leak now presents for  shunt placement.     -  shunt placement planned for Friday, 11/29 with NSG  - ACS team to be available for abdominal portion  - care per primary team    Patient discussed with Dr. Puja IRWIN  [id number]

## 2019-11-27 NOTE — PROGRESS NOTE ADULT - SUBJECTIVE AND OBJECTIVE BOX
LD at 5cc/hr    vitals/labs/meds/imaging reviewed  flat affect, fully oriented, neurologically intact exam    OR plan per neurosurgery  refer to earlier note for detail

## 2019-11-27 NOTE — PHYSICAL THERAPY INITIAL EVALUATION ADULT - PRECAUTIONS/LIMITATIONS, REHAB EVAL
CT Head 11/24: Right frontal craniectomy and wire mesh cranioplasty is redemonstrated. Subjacent extra-axial air is slightly increased. No acute intracranial hemorrhage, mass effect, or evidence for acute territorial infarct. CT L-spine 11/24: Status post L4 and L5 total laminectomies. Status post posterior fusion of L4-S1. Hardware well-placed. No evidence for hardware fracture or loosening. Evaluation of spinal canal contents limited, markedly so at the levels of surgery. MR Head 11/25: resolving dural enhancement the frontal region and anterior interhemispheric fissure related to resection of a high midline frontal of surgery. MR Head 11/25: resolving dural enhancement the frontal region and anterior interhemispheric fissure related to resection of a high midline frontal of surgery. MR Head 11/25: resolving dural enhancement the frontal region and anterior interhemispheric fissure related to resection of a high midline frontal  of surgery. MR Head 11/25: resolving dural enhancement the frontal region and anterior interhemispheric fissure related to resection of a high midline frontal of surgery. MR Head 11/25: resolving dural enhancement the frontal region and anterior interhemispheric fissure related to resection of a high midline frontal of surgery. MR Head 11/25: resolving dural enhancement the frontal region and anterior interhemispheric fissure related to resection of a high midline frontal of surgery. MR Head 11/25: resolving dural enhancement the frontal region and anterior interhemispheric fissure related to resection of a high midline frontal of surgery. MR Head 11/25: resolving dural enhancement the frontal region and anterior interhemispheric fissure related to resection of a high midline frontal of surgery. MR Head 11/25: resolving dural enhancement the frontal region and anterior interhemispheric fissure related to resection of a high midline frontal CT Head 11/24: Right frontal craniectomy and wire mesh cranioplasty is redemonstrated. Subjacent extra-axial air is slightly increased. No acute intracranial hemorrhage, mass effect, or evidence for acute territorial infarct. CT L-spine 11/24: Status post L4 and L5 total laminectomies. Status post posterior fusion of L4-S1. Hardware well-placed. No evidence for hardware fracture or loosening. Evaluation of spinal canal contents limited, markedly so at the levels of surgery. MR Head 11/25: resolving dural enhancement the frontal region and anterior interhemispheric fissure related to resection of a high midline frontal of surgery. Resolving postoperative gliosis. Resolving extra-axial air beneath the craniotomy site compared with 9/27/2019. No restricted diffusion to suggest intracranial extra-axial infection. Subarachnoid air may be related to continued CSF leak or lumbar drain.

## 2019-11-27 NOTE — PROGRESS NOTE ADULT - ASSESSMENT
ASSESSMENT/PLAN: S/P meningioma resection 9/19 , CSF leakage     NEURO:  neuro checks q 1hr   CSF 5 cc q 1 hr to be removed  Resume meds - home   VPS on Friday   Activity: [] OOB as tolerated [] Bedrest [] PT [] OT [] PMNR [] Bed in Chair Position     PULM:  Incentive gianfranco  Maintain Sats > 92 %    CV:  SBP goal- 100- < 150      RENAL: IVL       GI:   Diet: regular diet   GI prophylaxis [X] not indicated  Bowel regimen [] colace [X] senna [] other:    ENDO:   Goal euglycemia (-180)    HEME/ONC:  VTE prophylaxis: [X] SCDs [] chemoprophylaxis start Lovenox   Doppler LE     ID: Afebrile     SOCIAL/FAMILY:  [X] updated at bedside     CODE STATUS:  [X] Full Code     DISPOSITION:  [X] ICU     [X] Patient is at high risk of neurologic deterioration/death due to:  CSF leakage and infection     Time spent: 30 critical care minutes ASSESSMENT/PLAN: S/P meningioma resection 9/19 , CSF leakage     NEURO:  neuro checks q 1hr   CSF 5 cc q 1 hr to be removed  Resume meds - home   VPS on Friday   Activity: [] OOB as tolerated [] Bedrest [] PT [] OT [] PMNR [] Bed in Chair Position     PULM:  Incentive gianfranco  Maintain Sats > 92 %    CV:  SBP goal- 100- < 150      RENAL: IVL       GI:   Diet: regular diet   GI prophylaxis [X] not indicated  Bowel regimen [] colace [X] senna [] other:    ENDO:   Goal euglycemia (-180)    HEME/ONC:  VTE prophylaxis: [X] SCDs [] chemoprophylaxis start Lovenox   Doppler LE     ID: Afebrile     SOCIAL/FAMILY:  [X] updated at bedside     CODE STATUS:  [X] Full Code     DISPOSITION:  [X] ICU     [X] Patient is at high risk of neurologic deterioration/death due to:  CSF leakage and infection

## 2019-11-27 NOTE — OCCUPATIONAL THERAPY INITIAL EVALUATION ADULT - ADDITIONAL COMMENTS
VA duplex BLE 11/24: Negative. CT L-spine 11/24: Status post L4 and L5 total laminectomies. Status post posterior fusion of L4-S1. Hardware well-placed. CT head 11/24: Right frontal craniectomy and wire mesh cranioplasty is redemonstrated. Subjacent extra-axial air is slightly increased. No acute intracranial hemorrhage, mass effect, or evidence for acute territorial infarct. MR head 11/25: Resolving dural enhancement the frontal region and anterior interhemispheric fissure related to resection of a high midline frontal meningioma. Resolving postoperative gliosis. Resolving extra-axial air beneath the craniotomy site compared with 9/27/2019. .CT head 11/25: No acute intracranial hemorrhage, mass effect, vasogenic edema, or evidence of acute territorial infarct. Increased pneumocephalus subjacent to high midline frontoparietal craniectomy, craniotomy, and wire mesh cranioplasty.

## 2019-11-27 NOTE — PHYSICAL THERAPY INITIAL EVALUATION ADULT - SPECIFY REASON(S)
As per pt, pt ambulating unit independently x3 laps today. Spoke with ELIJAH Braxton, pt may be discharged from PT program

## 2019-11-27 NOTE — PROVIDER CONTACT NOTE (OTHER) - ASSESSMENT
Pt remains neurologically unchanged. Pt denies HA or neck pain. Pt A&Ox4 with no drifts on any extremity.

## 2019-11-27 NOTE — PROVIDER CONTACT NOTE (OTHER) - SITUATION
Upon entering room at change of shift to assess pt and drain 19:00 output, lumbar drain was found unclamped with an output of 25cc.

## 2019-11-27 NOTE — OCCUPATIONAL THERAPY INITIAL EVALUATION ADULT - PERTINENT HX OF CURRENT PROBLEM, REHAB EVAL
64F s/p recent resection of meningioma 9/24/19, c/b leaking from incision site s/p wound revision p/w possible leak from incision site, pillow and hair wet on R side this AM, denies possibility of other source of moisture. Otherwise asymptomatic. Pt s/p lumbar drain 11/24, awaiting plan for  shunt placement.

## 2019-11-27 NOTE — PROGRESS NOTE ADULT - SUBJECTIVE AND OBJECTIVE BOX
Pt is a 63 yo female hx of anxiety, depression , hypothyroidism , previous UTI, hx of meningioma 9/24/19. Admitted 11/24/19 with report of sheets damp and evaluation suspicious of CSF leak.  Allergy - PCN- hives      No overnight events       ICU Vital Signs Last 24 Hrs  T(C): 37 (26 Nov 2019 03:00), Max: 37.1 (25 Nov 2019 23:00)  T(F): 98.6 (26 Nov 2019 03:00), Max: 98.8 (25 Nov 2019 23:00)  HR: 78 (26 Nov 2019 07:00) (66 - 104)  BP: 121/75 (26 Nov 2019 07:00) (101/74 - 144/70)  BP(mean): 90 (26 Nov 2019 07:00) (80 - 105)  RR: 16 (26 Nov 2019 07:00) (12 - 23)  SpO2: 98% (26 Nov 2019 07:00) (92% - 100%)      11-25-19 @ 07:01  -  11-26-19 @ 07:00  --------------------------------------------------------  IN: 615 mL / OUT: 1115 mL / NET: -500 mL          PHYSICAL EXAM:    Constitutional: No Acute Distress     Neurological: Awake, alert oriented to person, place and time, Following Commands, PERRL, EOMI, No Gaze Preference, Face Symmetrical, Speech Fluent, No dysmetria, , No nystagmus     Motor: full strenght  Sensation: [ X] intact to light touch       Pulmonary: Clear to Auscultation, No rales, No rhonchi, No wheezes     Cardiovascular: S1, S2, Regular rate and rhythm     Gastrointestinal: Soft, Non-tender, Non-distended     Extremities: No calf tenderness     Incision: slight eryhthema at surg site

## 2019-11-27 NOTE — CONSULT NOTE ADULT - SUBJECTIVE AND OBJECTIVE BOX
GENERAL SURGERY CONSULT NOTE  Attending: Dr. Luo  Service: Acute Care Surgery   Contact: p [phone]    HPI  64F s/p recent resection of recurrent meningioma c/b leaking from incision site s/p wound revision p/w possible leak from incision site, pillow and hair wet on R side upon waking this morning. Otherwise asymptomatic. No headache, f/c, n/v. Fluid was clear.  Patient denies any fevers or chills.  No neurological symptoms.  Patient was admitted to Neurosurgery service. Underwent placement of lumbar drain with IR today, fluid sent for culture.  Plan to undergo placement of ventriculoperitoneal shunt tomorrow, general surgery consulted to assist with peritoneal placement via laparoscopy.  Patient has a left paramedian incision from a lumbar fusion (likely retroperitoneal approach), history of tubal ligation (transvaginally). On exam no other abdominal scars appreciated.     PMH/PSH  HLD (hyperlipidemia)  Thyroid nodule  OA (osteoarthritis)  Depression with anxiety, s/p ECT     S/P resection of meningioma  S/P thyroidectomy  S/P lumbar spinal fusion      MEDICATIONS  acetaminophen   Tablet .. 325 milliGRAM(s) Oral every 4 hours PRN  ARIPiprazole 2 milliGRAM(s) Oral daily  atorvastatin 40 milliGRAM(s) Oral at bedtime  buPROPion XL . 300 milliGRAM(s) Oral daily  chlorhexidine 4% Liquid 1 Application(s) Topical <User Schedule>  clonazePAM  Tablet 0.5 milliGRAM(s) Oral every 8 hours  folic acid 1 milliGRAM(s) Oral daily  levothyroxine 88 MICROGram(s) Oral daily  ondansetron Injectable 4 milliGRAM(s) IV Push every 6 hours PRN  polyethylene glycol 3350 17 Gram(s) Oral every 12 hours  senna 2 Tablet(s) Oral at bedtime PRN  sodium chloride 0.9% with potassium chloride 20 mEq/L 1000 milliLiter(s) IV Continuous <Continuous>      Allergies    penicillin (Hives)    Intolerances        Social: lives with , no ETOH, nonsmoker     Physical Exam  T(C): 36.6 (11-24-19 @ 19:00), Max: 36.7 (11-24-19 @ 11:00)  HR: 77 (11-24-19 @ 19:00) (70 - 89)  BP: 146/92 (11-24-19 @ 19:00) (117/84 - 146/92)  RR: 22 (11-24-19 @ 19:00) (11 - 22)  SpO2: 95% (11-24-19 @ 19:00) (94% - 100%)  Wt(kg): --  Tmax: T(C): , Max: 36.7 (11-24-19 @ 11:00)  Wt(kg): --    11-24-19 @ 07:01  -  11-24-19 @ 20:01  --------------------------------------------------------  IN: 225 mL / OUT: 15 mL / NET: 210 mL        Gen: NAD  Neuro: AAOx3  HEENT: normocephalic, atraumatic, no scleral icterus  CV: S1, S2, RRR  Pulm: CTA B/L  Abd: Soft, ND, NT, no rebound, no guarding, no palpable organomegaly/masses, Left paramedian scar   Ext: warm, no edema    LABS                        13.3   5.21  )-----------( 253      ( 24 Nov 2019 08:53 )             41.1     11-24    144  |  104  |  22  ----------------------------<  102<H>  4.3   |  27  |  1.16    Ca    9.6      24 Nov 2019 08:53    TPro  6.9  /  Alb  4.2  /  TBili  0.3  /  DBili  x   /  AST  16  /  ALT  18  /  AlkPhos  79  11-24    PT/INR - ( 24 Nov 2019 08:53 )   PT: 11.3 sec;   INR: 0.98 ratio         PTT - ( 24 Nov 2019 08:53 )  PTT:28.2 sec

## 2019-11-27 NOTE — PROVIDER CONTACT NOTE (OTHER) - BACKGROUND
Pt dx CSF leak secondary to meningioma ressection. Lumbar drain output of 5cc/hr as per provider order.

## 2019-11-28 LAB
ANION GAP SERPL CALC-SCNC: 13 MMOL/L — SIGNIFICANT CHANGE UP (ref 5–17)
APTT BLD: 31.5 SEC — SIGNIFICANT CHANGE UP (ref 27.5–36.3)
BLD GP AB SCN SERPL QL: NEGATIVE — SIGNIFICANT CHANGE UP
BUN SERPL-MCNC: 18 MG/DL — SIGNIFICANT CHANGE UP (ref 7–23)
CALCIUM SERPL-MCNC: 9.8 MG/DL — SIGNIFICANT CHANGE UP (ref 8.4–10.5)
CHLORIDE SERPL-SCNC: 105 MMOL/L — SIGNIFICANT CHANGE UP (ref 96–108)
CO2 SERPL-SCNC: 25 MMOL/L — SIGNIFICANT CHANGE UP (ref 22–31)
CREAT SERPL-MCNC: 1.16 MG/DL — SIGNIFICANT CHANGE UP (ref 0.5–1.3)
CULTURE RESULTS: NO GROWTH — SIGNIFICANT CHANGE UP
GLUCOSE SERPL-MCNC: 96 MG/DL — SIGNIFICANT CHANGE UP (ref 70–99)
HCT VFR BLD CALC: 43 % — SIGNIFICANT CHANGE UP (ref 34.5–45)
HGB BLD-MCNC: 13.8 G/DL — SIGNIFICANT CHANGE UP (ref 11.5–15.5)
INR BLD: 1.01 RATIO — SIGNIFICANT CHANGE UP (ref 0.88–1.16)
MAGNESIUM SERPL-MCNC: 2.1 MG/DL — SIGNIFICANT CHANGE UP (ref 1.6–2.6)
MCHC RBC-ENTMCNC: 27.8 PG — SIGNIFICANT CHANGE UP (ref 27–34)
MCHC RBC-ENTMCNC: 32.1 GM/DL — SIGNIFICANT CHANGE UP (ref 32–36)
MCV RBC AUTO: 86.5 FL — SIGNIFICANT CHANGE UP (ref 80–100)
NRBC # BLD: 0 /100 WBCS — SIGNIFICANT CHANGE UP (ref 0–0)
PHOSPHATE SERPL-MCNC: 3.1 MG/DL — SIGNIFICANT CHANGE UP (ref 2.5–4.5)
PLATELET # BLD AUTO: 273 K/UL — SIGNIFICANT CHANGE UP (ref 150–400)
POTASSIUM SERPL-MCNC: 3.8 MMOL/L — SIGNIFICANT CHANGE UP (ref 3.5–5.3)
POTASSIUM SERPL-SCNC: 3.8 MMOL/L — SIGNIFICANT CHANGE UP (ref 3.5–5.3)
PROTHROM AB SERPL-ACNC: 11.6 SEC — SIGNIFICANT CHANGE UP (ref 10–12.9)
RBC # BLD: 4.97 M/UL — SIGNIFICANT CHANGE UP (ref 3.8–5.2)
RBC # FLD: 12.6 % — SIGNIFICANT CHANGE UP (ref 10.3–14.5)
RH IG SCN BLD-IMP: POSITIVE — SIGNIFICANT CHANGE UP
SODIUM SERPL-SCNC: 143 MMOL/L — SIGNIFICANT CHANGE UP (ref 135–145)
SPECIMEN SOURCE: SIGNIFICANT CHANGE UP
WBC # BLD: 8.15 K/UL — SIGNIFICANT CHANGE UP (ref 3.8–10.5)
WBC # FLD AUTO: 8.15 K/UL — SIGNIFICANT CHANGE UP (ref 3.8–10.5)

## 2019-11-28 PROCEDURE — 99233 SBSQ HOSP IP/OBS HIGH 50: CPT

## 2019-11-28 RX ADMIN — Medication 0.5 MILLIGRAM(S): at 05:43

## 2019-11-28 RX ADMIN — ATORVASTATIN CALCIUM 40 MILLIGRAM(S): 80 TABLET, FILM COATED ORAL at 22:24

## 2019-11-28 RX ADMIN — BUPROPION HYDROCHLORIDE 300 MILLIGRAM(S): 150 TABLET, EXTENDED RELEASE ORAL at 11:11

## 2019-11-28 RX ADMIN — Medication 0.5 MILLIGRAM(S): at 22:23

## 2019-11-28 RX ADMIN — Medication 1 MILLIGRAM(S): at 11:10

## 2019-11-28 RX ADMIN — Medication 88 MICROGRAM(S): at 05:43

## 2019-11-28 RX ADMIN — CHLORHEXIDINE GLUCONATE 1 APPLICATION(S): 213 SOLUTION TOPICAL at 22:25

## 2019-11-28 RX ADMIN — ARIPIPRAZOLE 2 MILLIGRAM(S): 15 TABLET ORAL at 11:11

## 2019-11-28 RX ADMIN — Medication 0.5 MILLIGRAM(S): at 13:34

## 2019-11-28 NOTE — CHART NOTE - NSCHARTNOTEFT_GEN_A_CORE
PRE OPERATIVE NOTE    Procedure:  shunt insertion  Surgeon: [facility]/Trauma team and neurosurgery          CAPILLARY BLOOD GLUCOSE          Type & Screen: Type + Screen (11.24.19 @ 21:39)    ABO Interpretation: O    Rh Interpretation: Positive    Antibody Screen: Negative    CXR: Needed  EKG:   Echocardiogram:     A/P: 64y Female planned for above procedure  patient NEEDS: CXR, EKG, new type and screen, coags, UA  NPO past midnight, except medications  IVF  Pain/nausea control  AM labs  Needs consent

## 2019-11-29 ENCOUNTER — APPOINTMENT (OUTPATIENT)
Dept: NEUROSURGERY | Facility: HOSPITAL | Age: 64
End: 2019-11-29

## 2019-11-29 LAB
ANION GAP SERPL CALC-SCNC: 12 MMOL/L — SIGNIFICANT CHANGE UP (ref 5–17)
BUN SERPL-MCNC: 13 MG/DL — SIGNIFICANT CHANGE UP (ref 7–23)
CALCIUM SERPL-MCNC: 8.8 MG/DL — SIGNIFICANT CHANGE UP (ref 8.4–10.5)
CHLORIDE SERPL-SCNC: 104 MMOL/L — SIGNIFICANT CHANGE UP (ref 96–108)
CO2 SERPL-SCNC: 22 MMOL/L — SIGNIFICANT CHANGE UP (ref 22–31)
CREAT SERPL-MCNC: 0.97 MG/DL — SIGNIFICANT CHANGE UP (ref 0.5–1.3)
GLUCOSE SERPL-MCNC: 217 MG/DL — HIGH (ref 70–99)
HCT VFR BLD CALC: 37.1 % — SIGNIFICANT CHANGE UP (ref 34.5–45)
HGB BLD-MCNC: 12.1 G/DL — SIGNIFICANT CHANGE UP (ref 11.5–15.5)
MAGNESIUM SERPL-MCNC: 2 MG/DL — SIGNIFICANT CHANGE UP (ref 1.6–2.6)
MCHC RBC-ENTMCNC: 28.2 PG — SIGNIFICANT CHANGE UP (ref 27–34)
MCHC RBC-ENTMCNC: 32.6 GM/DL — SIGNIFICANT CHANGE UP (ref 32–36)
MCV RBC AUTO: 86.5 FL — SIGNIFICANT CHANGE UP (ref 80–100)
NRBC # BLD: 0 /100 WBCS — SIGNIFICANT CHANGE UP (ref 0–0)
PHOSPHATE SERPL-MCNC: 2.5 MG/DL — SIGNIFICANT CHANGE UP (ref 2.5–4.5)
PLATELET # BLD AUTO: 237 K/UL — SIGNIFICANT CHANGE UP (ref 150–400)
POTASSIUM SERPL-MCNC: 4.1 MMOL/L — SIGNIFICANT CHANGE UP (ref 3.5–5.3)
POTASSIUM SERPL-SCNC: 4.1 MMOL/L — SIGNIFICANT CHANGE UP (ref 3.5–5.3)
RBC # BLD: 4.29 M/UL — SIGNIFICANT CHANGE UP (ref 3.8–5.2)
RBC # FLD: 12.7 % — SIGNIFICANT CHANGE UP (ref 10.3–14.5)
SODIUM SERPL-SCNC: 138 MMOL/L — SIGNIFICANT CHANGE UP (ref 135–145)
WBC # BLD: 11.25 K/UL — HIGH (ref 3.8–10.5)
WBC # FLD AUTO: 11.25 K/UL — HIGH (ref 3.8–10.5)

## 2019-11-29 PROCEDURE — 99233 SBSQ HOSP IP/OBS HIGH 50: CPT

## 2019-11-29 PROCEDURE — 62223 ESTABLISH BRAIN CAVITY SHUNT: CPT | Mod: 62,58

## 2019-11-29 PROCEDURE — 62223 ESTABLISH BRAIN CAVITY SHUNT: CPT | Mod: 62

## 2019-11-29 PROCEDURE — 70450 CT HEAD/BRAIN W/O DYE: CPT | Mod: 26

## 2019-11-29 PROCEDURE — 61781 SCAN PROC CRANIAL INTRA: CPT | Mod: 58

## 2019-11-29 RX ORDER — FOLIC ACID 0.8 MG
1 TABLET ORAL DAILY
Refills: 0 | Status: DISCONTINUED | OUTPATIENT
Start: 2019-11-29 | End: 2019-12-02

## 2019-11-29 RX ORDER — LEVOTHYROXINE SODIUM 125 MCG
88 TABLET ORAL DAILY
Refills: 0 | Status: DISCONTINUED | OUTPATIENT
Start: 2019-11-29 | End: 2019-12-02

## 2019-11-29 RX ORDER — OXYCODONE HYDROCHLORIDE 5 MG/1
5 TABLET ORAL EVERY 4 HOURS
Refills: 0 | Status: DISCONTINUED | OUTPATIENT
Start: 2019-11-29 | End: 2019-12-02

## 2019-11-29 RX ORDER — SENNA PLUS 8.6 MG/1
2 TABLET ORAL AT BEDTIME
Refills: 0 | Status: DISCONTINUED | OUTPATIENT
Start: 2019-11-29 | End: 2019-12-02

## 2019-11-29 RX ORDER — VANCOMYCIN HCL 1 G
1500 VIAL (EA) INTRAVENOUS ONCE
Refills: 0 | Status: COMPLETED | OUTPATIENT
Start: 2019-11-29 | End: 2019-11-29

## 2019-11-29 RX ORDER — CLONAZEPAM 1 MG
0.5 TABLET ORAL EVERY 8 HOURS
Refills: 0 | Status: DISCONTINUED | OUTPATIENT
Start: 2019-11-29 | End: 2019-12-02

## 2019-11-29 RX ORDER — POLYETHYLENE GLYCOL 3350 17 G/17G
17 POWDER, FOR SOLUTION ORAL EVERY 12 HOURS
Refills: 0 | Status: DISCONTINUED | OUTPATIENT
Start: 2019-11-29 | End: 2019-12-02

## 2019-11-29 RX ORDER — DEXTROSE MONOHYDRATE, SODIUM CHLORIDE, AND POTASSIUM CHLORIDE 50; .745; 4.5 G/1000ML; G/1000ML; G/1000ML
1000 INJECTION, SOLUTION INTRAVENOUS
Refills: 0 | Status: DISCONTINUED | OUTPATIENT
Start: 2019-11-29 | End: 2019-11-30

## 2019-11-29 RX ORDER — ONDANSETRON 8 MG/1
4 TABLET, FILM COATED ORAL EVERY 6 HOURS
Refills: 0 | Status: DISCONTINUED | OUTPATIENT
Start: 2019-11-29 | End: 2019-12-02

## 2019-11-29 RX ORDER — ATORVASTATIN CALCIUM 80 MG/1
40 TABLET, FILM COATED ORAL AT BEDTIME
Refills: 0 | Status: DISCONTINUED | OUTPATIENT
Start: 2019-11-29 | End: 2019-12-02

## 2019-11-29 RX ORDER — ACETAMINOPHEN 500 MG
650 TABLET ORAL EVERY 6 HOURS
Refills: 0 | Status: DISCONTINUED | OUTPATIENT
Start: 2019-11-29 | End: 2019-12-02

## 2019-11-29 RX ORDER — FAMOTIDINE 10 MG/ML
20 INJECTION INTRAVENOUS EVERY 12 HOURS
Refills: 0 | Status: DISCONTINUED | OUTPATIENT
Start: 2019-11-29 | End: 2019-12-02

## 2019-11-29 RX ORDER — ARIPIPRAZOLE 15 MG/1
2 TABLET ORAL DAILY
Refills: 0 | Status: DISCONTINUED | OUTPATIENT
Start: 2019-11-29 | End: 2019-12-02

## 2019-11-29 RX ORDER — BUPROPION HYDROCHLORIDE 150 MG/1
300 TABLET, EXTENDED RELEASE ORAL DAILY
Refills: 0 | Status: DISCONTINUED | OUTPATIENT
Start: 2019-11-29 | End: 2019-12-02

## 2019-11-29 RX ADMIN — ATORVASTATIN CALCIUM 40 MILLIGRAM(S): 80 TABLET, FILM COATED ORAL at 21:17

## 2019-11-29 RX ADMIN — SENNA PLUS 2 TABLET(S): 8.6 TABLET ORAL at 21:17

## 2019-11-29 RX ADMIN — POLYETHYLENE GLYCOL 3350 17 GRAM(S): 17 POWDER, FOR SOLUTION ORAL at 17:39

## 2019-11-29 RX ADMIN — ARIPIPRAZOLE 2 MILLIGRAM(S): 15 TABLET ORAL at 13:18

## 2019-11-29 RX ADMIN — OXYCODONE HYDROCHLORIDE 5 MILLIGRAM(S): 5 TABLET ORAL at 13:16

## 2019-11-29 RX ADMIN — OXYCODONE HYDROCHLORIDE 5 MILLIGRAM(S): 5 TABLET ORAL at 13:45

## 2019-11-29 RX ADMIN — Medication 1 APPLICATION(S): at 17:40

## 2019-11-29 RX ADMIN — Medication 0.5 MILLIGRAM(S): at 06:07

## 2019-11-29 RX ADMIN — Medication 88 MICROGRAM(S): at 06:07

## 2019-11-29 RX ADMIN — FAMOTIDINE 20 MILLIGRAM(S): 10 INJECTION INTRAVENOUS at 17:38

## 2019-11-29 RX ADMIN — BUPROPION HYDROCHLORIDE 300 MILLIGRAM(S): 150 TABLET, EXTENDED RELEASE ORAL at 13:16

## 2019-11-29 RX ADMIN — Medication 0.5 MILLIGRAM(S): at 16:03

## 2019-11-29 RX ADMIN — OXYCODONE HYDROCHLORIDE 5 MILLIGRAM(S): 5 TABLET ORAL at 21:17

## 2019-11-29 RX ADMIN — DEXTROSE MONOHYDRATE, SODIUM CHLORIDE, AND POTASSIUM CHLORIDE 75 MILLILITER(S): 50; .745; 4.5 INJECTION, SOLUTION INTRAVENOUS at 16:03

## 2019-11-29 RX ADMIN — Medication 300 MILLIGRAM(S): at 21:17

## 2019-11-29 RX ADMIN — Medication 1 MILLIGRAM(S): at 13:16

## 2019-11-29 RX ADMIN — OXYCODONE HYDROCHLORIDE 5 MILLIGRAM(S): 5 TABLET ORAL at 22:00

## 2019-11-29 RX ADMIN — Medication 0.5 MILLIGRAM(S): at 21:17

## 2019-11-29 NOTE — PROGRESS NOTE ADULT - ASSESSMENT
ASSESSMENT/PLAN: S/P meningioma resection 9/19 , CSF leakage, now with LD     NEURO:  neuro checks q 1hr   LD clamped at 5:30 am   CTH  stereo at 7 am prior to OR   VPS today  Activity: [] OOB as tolerated [] Bedrest [] PT [] OT [] PMNR [] Bed in Chair Position     PULM:  Incentive gianfranco  Maintain Sats > 92 %    CV:  SBP goal- 100- < 150      RENAL: IVL       GI:   Diet: regular diet   GI prophylaxis [X] not indicated  Bowel regimen [] colace [X] senna [] other:    ENDO:   Goal euglycemia (-180)    HEME/ONC:  VTE prophylaxis: [X] SCDs [] chemoprophylaxis start Lovenox   Doppler LE     ID: Afebrile     SOCIAL/FAMILY:  [X] updated at bedside     CODE STATUS:  [X] Full Code     DISPOSITION:  [X] ICU     [X] Patient is at high risk of neurologic deterioration/death due to:  CSF leakage and infection     Time spent: 30 critical care minutes ASSESSMENT/PLAN: S/P meningioma resection 9/19 , CSF leakage, now with LD     NEURO:  neuro checks q 1hr   LD clamped at 5:30 am   CTH  stereo at 7 am prior to OR   VPS today  CTH tomorrow   Watch for csf leak   Activity: [] OOB as tolerated [x] Bedrest [] PT [] OT [] PMNR [] Bed in Chair Position     PULM:  Incentive gianfranco  Maintain Sats > 92 %    CV:  SBP goal- 100- < 150      RENAL: IVF      GI:   Diet: advance as tolerated   GI prophylaxis [X] not indicated  Bowel regimen [] colace [X] senna [] other:    ENDO:   Goal euglycemia (-180)    HEME/ONC:  VTE prophylaxis: [X] SCDs [] chemoprophylaxis hold lovenox   Doppler LE     ID: Afebrile     SOCIAL/FAMILY:  [X] updated at bedside     CODE STATUS:  [X] Full Code     DISPOSITION:  [X] ICU     [X] Patient is at high risk of neurologic deterioration/death due to:  CSF leakage and infection     Time spent: 30 critical care minutes ASSESSMENT/PLAN: S/P meningioma resection 9/19 , CSF leakage, now with LD     NEURO:  neuro checks q 1hr   LD clamped at 5:30 am   CTH  stereo at 7 am prior to OR   VPS today  CTH tomorrow   Watch for csf leak   Activity: [] OOB as tolerated [x] Bedrest [] PT [] OT [] PMNR [] Bed in Chair Position     PULM:  Incentive gianfranco  Maintain Sats > 92 %    CV:  SBP goal- 100- < 150      RENAL: IVF      GI:   Diet: advance as tolerated   GI prophylaxis [X] not indicated  Bowel regimen [] colace [X] senna [] other:    ENDO:   Goal euglycemia (-180)    HEME/ONC:  VTE prophylaxis: [X] SCDs [] chemoprophylaxis hold lovenox   Doppler LE     ID: Afebrile     SOCIAL/FAMILY:  [X] updated at bedside     CODE STATUS:  [X] Full Code     DISPOSITION:  [X] ICU

## 2019-11-29 NOTE — CHART NOTE - NSCHARTNOTEFT_GEN_A_CORE
SUBJECTIVE:    Patient seen and examined s/p  shunt this morning.   Patient is doing well, c/o abdominal pain earlier but improved following pain medication.   Also reports R eye pain and "gritty sensation"; denies changes in vision.         OBJECTIVE:     ** VITAL SIGNS / I&O's **    T(C): 36.3 (11-29-19 @ 11:05), Max: 36.7 (11-28-19 @ 23:00)  T(F): 97.3 (11-29-19 @ 11:05), Max: 98 (11-28-19 @ 23:00)  HR: 67 (11-29-19 @ 13:00) (53 - 100)  BP: 111/68 (11-29-19 @ 13:00) (105/65 - 128/67)  RR: 15 (11-29-19 @ 13:00) (9 - 21)  SpO2: 100% (11-29-19 @ 13:00) (94% - 100%)      28 Nov 2019 07:01  -  29 Nov 2019 07:00  --------------------------------------------------------  IN:    Oral Fluid: 1140 mL  Total IN: 1140 mL    OUT:    Drain: 105 mL    Voided: 1820 mL  Total OUT: 1925 mL    Total NET: -785 mL      29 Nov 2019 07:01  -  29 Nov 2019 15:49  --------------------------------------------------------  IN:    Oral Fluid: 1000 mL    sodium chloride 0.9% with potassium chloride 20 mEq/L: 300 mL  Total IN: 1300 mL    OUT:  Total OUT: 0 mL    Total NET: 1300 mL          ** PHYSICAL EXAM **    >> General: Well-developed. Well-nourished. No acute distress.  >> Neck: Supple.  >> HEENT: Head dressing CDI, no strikethrough. EOMI, PERRL bilaterally.   >> Cardiovascular: RRR. S1, S2.  >> Lungs: Bilateral breath sounds.   >> Abdomen: Dressings X 2 CDI, abdomen soft and appropriately tender around incision sites. Soft, non distended.   >> Extremities: Unremarkable.     ** LABS **                 13.8   8.15   )----------(  273       ( 28 Nov 2019 21:14 )               43.0      143    |  105    |  18     ----------------------------<  96         ( 28 Nov 2019 21:14 )  3.8     |  25     |  1.16     Ca    9.8        ( 28 Nov 2019 21:14 )  Phos  3.1       ( 28 Nov 2019 21:14 )  Mg     2.1       ( 28 Nov 2019 21:14 )      PT/INR -  11.6 sec / 1.01 ratio   ( 28 Nov 2019 21:14 )       PTT -  31.5 sec   ( 28 Nov 2019 21:14 )  CAPILLARY BLOOD GLUCOSE        A/P:    63 y/o F s/p  shunt with NSx this morning:     - Continue management per Nsx, appreciate care   - Pain control  - Continue dressings to abdomen   - Given patients R eye symptoms, would contact opthalmology to rule out corneal abrasion s/p surgery. Primary team made aware       WILNER Duval PGY2  [facility] Surgery   [phone]

## 2019-11-29 NOTE — PROGRESS NOTE ADULT - SUBJECTIVE AND OBJECTIVE BOX
· Subjective and Objective:   Pt is a 63 yo female hx of anxiety, depression , hypothyroidism , previous UTI, hx of meningioma 9/24/19. Admitted 11/24/19 with report of sheets damp and evaluation suspicious of CSF leak.  Allergy - PCN- hives          REVIEW OF SYSTEMS: [ ] Unable to Assess due to neurologic exam   [x] All ROS addressed below are non-contributory, except:  Neuro: [ ] Headache [ ] Back pain [ ] Numbness [ ] Weakness [ ] Ataxia [ ] Dizziness [ ] Aphasia [ ] Dysarthria [ ] Visual disturbance  Resp: [ ] Shortness of breath/dyspnea, [ ] Orthopnea [ ] Cough  CV: [ ] Chest pain [ ] Palpitation [ ] Lightheadedness [ ] Syncope  Renal: [ ] Thirst [ ] Edema  GI: [ ] Nausea [ ] Emesis [ ] Abdominal pain [ ] Constipation [ ] Diarrhea  Hem: [ ] Hematemesis [ ] bright red blood per rectum  ID: [ ] Fever [ ] Chills [ ] Dysuria  ENT: [ ] Rhinorrhea        ICU Vital Signs Last 24 Hrs  T(C): 36.6 (29 Nov 2019 03:00), Max: 36.8 (28 Nov 2019 11:00)  T(F): 97.8 (29 Nov 2019 03:00), Max: 98.2 (28 Nov 2019 11:00)  HR: 73 (29 Nov 2019 07:22) (60 - 101)  BP: 116/76 (29 Nov 2019 07:22) (105/65 - 132/79)  BP(mean): 87 (29 Nov 2019 07:22) (78 - 97)  RR: 12 (29 Nov 2019 07:22) (12 - 23)  SpO2: 97% (29 Nov 2019 07:22) (94% - 99%)      11-28-19 @ 07:01  -  11-29-19 @ 07:00  --------------------------------------------------------  IN: 1140 mL / OUT: 1925 mL / NET: -785 mL        acetaminophen   Tablet .. 650 milliGRAM(s) Oral every 6 hours PRN  ARIPiprazole 2 milliGRAM(s) Oral daily  atorvastatin 40 milliGRAM(s) Oral at bedtime  buPROPion XL . 300 milliGRAM(s) Oral daily  chlorhexidine 4% Liquid 1 Application(s) Topical <User Schedule>  clonazePAM  Tablet 0.5 milliGRAM(s) Oral every 8 hours  folic acid 1 milliGRAM(s) Oral daily  levothyroxine 88 MICROGram(s) Oral daily  ondansetron Injectable 4 milliGRAM(s) IV Push every 6 hours PRN  polyethylene glycol 3350 17 Gram(s) Oral every 12 hours  senna 2 Tablet(s) Oral at bedtime PRN                          13.8   8.15  )-----------( 273      ( 28 Nov 2019 21:14 )             43.0     11-28    143  |  105  |  18  ----------------------------<  96  3.8   |  25  |  1.16    Ca    9.8      28 Nov 2019 21:14  Phos  3.1     11-28  Mg     2.1     11-28    General: No acute distress  HEENT: Anicteric sclerae  Cardiac: [id number]  Lungs: Clear  Abdomen: Soft, non-tender, +BS  Extremities: No c/c/e  Skin/Incision Site: Clean, dry and intact  Neurologic: Awake, alert, fully oriented, follows commands, PERRL, VFFtc, EOMI, face symmetric, tongue midline, no drift, full strength Subjective and Objective:   Pt is a 63 yo female hx of anxiety, depression , hypothyroidism , previous UTI, hx of meningioma 9/24/19. Admitted 11/24/19 with report of sheets damp and evaluation suspicious of CSF leak.  Allergy - PCN- hives          REVIEW OF SYSTEMS: [ ] Unable to Assess due to neurologic exam   [x] All ROS addressed below are non-contributory, except:  Neuro: [ ] Headache [ ] Back pain [ ] Numbness [ ] Weakness [ ] Ataxia [ ] Dizziness [ ] Aphasia [ ] Dysarthria [ ] Visual disturbance  Resp: [ ] Shortness of breath/dyspnea, [ ] Orthopnea [ ] Cough  CV: [ ] Chest pain [ ] Palpitation [ ] Lightheadedness [ ] Syncope  Renal: [ ] Thirst [ ] Edema  GI: [ ] Nausea [ ] Emesis [ ] Abdominal pain [ ] Constipation [ ] Diarrhea  Hem: [ ] Hematemesis [ ] bright red blood per rectum  ID: [ ] Fever [ ] Chills [ ] Dysuria  ENT: [ ] Rhinorrhea        ICU Vital Signs Last 24 Hrs  T(C): 36.6 (29 Nov 2019 03:00), Max: 36.8 (28 Nov 2019 11:00)  T(F): 97.8 (29 Nov 2019 03:00), Max: 98.2 (28 Nov 2019 11:00)  HR: 73 (29 Nov 2019 07:22) (60 - 101)  BP: 116/76 (29 Nov 2019 07:22) (105/65 - 132/79)  BP(mean): 87 (29 Nov 2019 07:22) (78 - 97)  RR: 12 (29 Nov 2019 07:22) (12 - 23)  SpO2: 97% (29 Nov 2019 07:22) (94% - 99%)      11-28-19 @ 07:01  -  11-29-19 @ 07:00  --------------------------------------------------------  IN: 1140 mL / OUT: 1925 mL / NET: -785 mL        acetaminophen   Tablet .. 650 milliGRAM(s) Oral every 6 hours PRN  ARIPiprazole 2 milliGRAM(s) Oral daily  atorvastatin 40 milliGRAM(s) Oral at bedtime  buPROPion XL . 300 milliGRAM(s) Oral daily  chlorhexidine 4% Liquid 1 Application(s) Topical <User Schedule>  clonazePAM  Tablet 0.5 milliGRAM(s) Oral every 8 hours  folic acid 1 milliGRAM(s) Oral daily  levothyroxine 88 MICROGram(s) Oral daily  ondansetron Injectable 4 milliGRAM(s) IV Push every 6 hours PRN  polyethylene glycol 3350 17 Gram(s) Oral every 12 hours  senna 2 Tablet(s) Oral at bedtime PRN                          13.8   8.15  )-----------( 273      ( 28 Nov 2019 21:14 )             43.0     11-28    143  |  105  |  18  ----------------------------<  96  3.8   |  25  |  1.16    Ca    9.8      28 Nov 2019 21:14  Phos  3.1     11-28  Mg     2.1     11-28    General: No acute distress  HEENT: Anicteric sclerae  Cardiac: [id number]  Lungs: Clear  Abdomen: Soft, non-tender, +BS  Extremities: No c/c/e  Skin/Incision Site: Clean, dry and intact  Neurologic: Awake, alert, fully oriented, follows commands, PERRL, VFFtc, EOMI, face symmetric, tongue midline, no drift, full strength

## 2019-11-29 NOTE — REASON FOR VISIT
[Spouse] : spouse [de-identified] : Surgical wound revision s/p infection [de-identified] : 10/25/19

## 2019-11-29 NOTE — BRIEF OPERATIVE NOTE - NSICDXBRIEFPROCEDURE_GEN_ALL_CORE_FT
PROCEDURES:   shunt 29-Nov-2019 10:22:32  Sudarshan Palencia
PROCEDURES:   shunt 29-Nov-2019 10:22:32  Sudarshan Palencia

## 2019-11-29 NOTE — BRIEF OPERATIVE NOTE - OPERATION/FINDINGS
Abdominal portion of  shunt placement procedure. The catheter had been tunneled by the Neurosurgical team to the Patient's RUQ. Umbilical Harry port placed for direct visualization of catheter placement. Peel-away trochar used to catheter placement. Free flowing CSF directly visualized. Umbilical port site fascia closed with 0 vicryl figure of eight.
-

## 2019-11-29 NOTE — PROGRESS NOTE ADULT - ASSESSMENT
POD#0 VPS Certas @ 2.0    monitor for further CSF leak--post op incision leak  CTH in am  cont psych meds  cont synthroid

## 2019-11-29 NOTE — PRE-ANESTHESIA EVALUATION ADULT - NSANTHOSAYNRD_GEN_A_CORE
No. STEFANY screening performed.  STOP BANG Legend: 0-2 = LOW Risk; 3-4 = INTERMEDIATE Risk; 5-8 = HIGH Risk
No. STEFANY screening performed.  STOP BANG Legend: 0-2 = LOW Risk; 3-4 = INTERMEDIATE Risk; 5-8 = HIGH Risk

## 2019-11-29 NOTE — ASSESSMENT
[FreeTextEntry1] : Discussion:\par - Patient completed two week course of cipro po.\par - Patient to continue to use ointment to the scalp incision until her next visit with Dr. Banks.\par - Patient without any new symptoms. No leakage from the surgical site. Site is healing well. No s/s ofinfection. Patient has been afebrile.\par - Final pathology from pending grading.\par

## 2019-11-29 NOTE — PROGRESS NOTE ADULT - SUBJECTIVE AND OBJECTIVE BOX
s/p VPS, LD removed    vitals/labs/meds/imaging reviewed  flat affect, fully oriented, neurologically intact exam

## 2019-11-29 NOTE — PRE-ANESTHESIA EVALUATION ADULT - NSANTHPMHFT_GEN_ALL_CORE
64F, hx of anxiety, depression , hypothyroidism , previous UTI, hx of meningioma 9/24/19. s/p recent resection of recurrent meningioma c/b CSF leak. s/p wound revision p/w possible leak from incision site; h/o tubal ligation
Hx of meningioma and craniotomy, now persistent CSF leak.

## 2019-11-29 NOTE — BRIEF OPERATIVE NOTE - NSICDXBRIEFPREOP_GEN_ALL_CORE_FT
PRE-OP DIAGNOSIS:  Hydrocephalus 29-Nov-2019 10:23:25  Sudarshan Palencia
PRE-OP DIAGNOSIS:  Hydrocephalus 29-Nov-2019 10:23:25  Sudarshan Palencia

## 2019-11-29 NOTE — PROGRESS NOTE ADULT - SUBJECTIVE AND OBJECTIVE BOX
Patient seen and examined at bedside.    --Anticoagulation--    T(C): 36.5 (11-29-19 @ 07:00), Max: 36.8 (11-28-19 @ 11:00)  HR: 73 (11-29-19 @ 07:22) (68 - 101)  BP: 116/76 (11-29-19 @ 07:22) (105/65 - 128/67)  RR: 12 (11-29-19 @ 07:22) (12 - 23)  SpO2: 97% (11-29-19 @ 07:22) (94% - 100%)  Wt(kg): --    Exam:  AOx3, FC, PERRL, EOMI, no facial   5/5 throughout, no drift  SILT  no clonus

## 2019-11-30 PROCEDURE — 70450 CT HEAD/BRAIN W/O DYE: CPT | Mod: 26

## 2019-11-30 PROCEDURE — 99233 SBSQ HOSP IP/OBS HIGH 50: CPT

## 2019-11-30 RX ORDER — ENOXAPARIN SODIUM 100 MG/ML
40 INJECTION SUBCUTANEOUS
Refills: 0 | Status: DISCONTINUED | OUTPATIENT
Start: 2019-11-30 | End: 2019-12-02

## 2019-11-30 RX ORDER — CHLORHEXIDINE GLUCONATE 213 G/1000ML
1 SOLUTION TOPICAL
Refills: 0 | Status: DISCONTINUED | OUTPATIENT
Start: 2019-11-30 | End: 2019-12-01

## 2019-11-30 RX ADMIN — Medication 1 MILLIGRAM(S): at 12:36

## 2019-11-30 RX ADMIN — FAMOTIDINE 20 MILLIGRAM(S): 10 INJECTION INTRAVENOUS at 17:58

## 2019-11-30 RX ADMIN — Medication 650 MILLIGRAM(S): at 06:12

## 2019-11-30 RX ADMIN — ENOXAPARIN SODIUM 40 MILLIGRAM(S): 100 INJECTION SUBCUTANEOUS at 17:58

## 2019-11-30 RX ADMIN — Medication 1 APPLICATION(S): at 01:19

## 2019-11-30 RX ADMIN — ATORVASTATIN CALCIUM 40 MILLIGRAM(S): 80 TABLET, FILM COATED ORAL at 21:08

## 2019-11-30 RX ADMIN — Medication 0.5 MILLIGRAM(S): at 13:16

## 2019-11-30 RX ADMIN — DEXTROSE MONOHYDRATE, SODIUM CHLORIDE, AND POTASSIUM CHLORIDE 75 MILLILITER(S): 50; .745; 4.5 INJECTION, SOLUTION INTRAVENOUS at 19:00

## 2019-11-30 RX ADMIN — Medication 650 MILLIGRAM(S): at 13:51

## 2019-11-30 RX ADMIN — ONDANSETRON 4 MILLIGRAM(S): 8 TABLET, FILM COATED ORAL at 11:58

## 2019-11-30 RX ADMIN — Medication 0.5 MILLIGRAM(S): at 21:08

## 2019-11-30 RX ADMIN — Medication 1 APPLICATION(S): at 17:58

## 2019-11-30 RX ADMIN — Medication 0.5 MILLIGRAM(S): at 06:12

## 2019-11-30 RX ADMIN — OXYCODONE HYDROCHLORIDE 5 MILLIGRAM(S): 5 TABLET ORAL at 07:10

## 2019-11-30 RX ADMIN — OXYCODONE HYDROCHLORIDE 5 MILLIGRAM(S): 5 TABLET ORAL at 02:49

## 2019-11-30 RX ADMIN — Medication 650 MILLIGRAM(S): at 06:42

## 2019-11-30 RX ADMIN — BUPROPION HYDROCHLORIDE 300 MILLIGRAM(S): 150 TABLET, EXTENDED RELEASE ORAL at 13:16

## 2019-11-30 RX ADMIN — Medication 1 APPLICATION(S): at 12:35

## 2019-11-30 RX ADMIN — OXYCODONE HYDROCHLORIDE 5 MILLIGRAM(S): 5 TABLET ORAL at 03:19

## 2019-11-30 RX ADMIN — Medication 650 MILLIGRAM(S): at 13:21

## 2019-11-30 RX ADMIN — OXYCODONE HYDROCHLORIDE 5 MILLIGRAM(S): 5 TABLET ORAL at 08:00

## 2019-11-30 RX ADMIN — POLYETHYLENE GLYCOL 3350 17 GRAM(S): 17 POWDER, FOR SOLUTION ORAL at 06:12

## 2019-11-30 RX ADMIN — Medication 88 MICROGRAM(S): at 06:12

## 2019-11-30 RX ADMIN — FAMOTIDINE 20 MILLIGRAM(S): 10 INJECTION INTRAVENOUS at 06:12

## 2019-11-30 RX ADMIN — ARIPIPRAZOLE 2 MILLIGRAM(S): 15 TABLET ORAL at 13:16

## 2019-11-30 RX ADMIN — POLYETHYLENE GLYCOL 3350 17 GRAM(S): 17 POWDER, FOR SOLUTION ORAL at 17:58

## 2019-11-30 RX ADMIN — CHLORHEXIDINE GLUCONATE 1 APPLICATION(S): 213 SOLUTION TOPICAL at 21:08

## 2019-11-30 NOTE — PROGRESS NOTE ADULT - ASSESSMENT
A/P:    65 y/o F s/p  shunt with NSx yesterday:    - Continue management per Nsx, appreciate care   - Pain control  - Continue dressings to abdomen   - Patient doing well, no acute concerns from general surgery standpoint. Please call [facility] is any further questions/concerns [phone]   - Patient may follow up with Dr. Luo for wound check once discharged home.       WILNER Duval PGY2  [facility]   [phone].

## 2019-11-30 NOTE — PROGRESS NOTE ADULT - ASSESSMENT
ASSESSMENT/PLAN: S/P meningioma resection 9/19 , CSF leakage, now with LD     NEURO:  neuro checks q 1hr   LD clamped at 5:30 am   CTH  stereo at 7 am prior to OR   VPS today  CTH tomorrow   Watch for csf leak   Activity: [] OOB as tolerated [x] Bedrest [] PT [] OT [] PMNR [] Bed in Chair Position     PULM:  Incentive gianfranco  Maintain Sats > 92 %    CV:  SBP goal- 100- < 150      RENAL: IVF      GI:   Diet: advance as tolerated   GI prophylaxis [X] not indicated  Bowel regimen [] colace [X] senna [] other:    ENDO:   Goal euglycemia (-180)    HEME/ONC:  VTE prophylaxis: [X] SCDs [] chemoprophylaxis hold lovenox   Doppler LE     ID: Afebrile     SOCIAL/FAMILY:  [X] updated at bedside     CODE STATUS:  [X] Full Code     DISPOSITION:  [X] ICU ASSESSMENT/PLAN: S/P meningioma resection 9/19 , CSF leakage, now with LD   s/p VPS 11/29     NEURO:  neuro checks q 1hr   VPS 11/29   CTH today   Watch for csf leak   Depression/Anxiety : Bupropion, Clonazepam, Aripiprazole   Activity: [] OOB as tolerated [] Bedrest [x] PT [x] OT [] PMNR [] Bed in Chair Position     PULM:  Incentive gianfranco  Maintain Sats > 92 %    CV:  SBP goal- 100- < 150      RENAL: IVF      GI:   Diet: advance as tolerated   GI prophylaxis [] not indicated, Famotidine   Bowel regimen [] colace [X] senna [] other:    ENDO:   Goal euglycemia (-180)    HEME/ONC:  VTE prophylaxis: [X] SCDs [] chemoprophylaxis hold lovenox   Doppler LE     ID: Afebrile     SOCIAL/FAMILY:  [X] updated at bedside     CODE STATUS:  [X] Full Code     DISPOSITION:  [X] ICU ASSESSMENT/PLAN: S/P meningioma resection 9/19 , CSF leakage, now with LD   s/p VPS 11/29     NEURO:  neuro checks q 1hr   VPS 11/29   CTH today stable   Watch for csf leak   Depression/Anxiety : Bupropion, Clonazepam, Aripiprazole   Activity: [] OOB as tolerated [] Bedrest [x] PT [x] OT [] PMNR [] Bed in Chair Position     PULM:  Incentive gianfranco  Maintain Sats > 92 %    CV:  SBP goal- 100- < 150      RENAL: IVL      GI:   Diet: Regular diet   GI prophylaxis [] not indicated, Famotidine   Bowel regimen [] colace [X] senna [] other:    ENDO:   Goal euglycemia (-180)    HEME/ONC:  VTE prophylaxis: [X] SCDs [] chemoprophylaxis: start Lovenox   Doppler LE     ID: Afebrile     SOCIAL/FAMILY:  [X] updated at bedside     CODE STATUS:  [X] Full Code     DISPOSITION: Floor

## 2019-11-30 NOTE — PROVIDER CONTACT NOTE (CHANGE IN STATUS NOTIFICATION) - SITUATION
During change of shift report, to assess pt drain 1900 output, lumbar drain was found unclamped and output of 25cc from 1830 to 1900.

## 2019-11-30 NOTE — PROVIDER CONTACT NOTE (CHANGE IN STATUS NOTIFICATION) - BACKGROUND
Pt dx CSF leak secondary to meningioma resection. Lumbar drain output of 5cc/hr as per provider order.

## 2019-11-30 NOTE — PROGRESS NOTE ADULT - ASSESSMENT
POD#1 VPS Certas @ 2.0    cont psych meds  cont synthroid  awaiting floor transfer pending bed availability

## 2019-11-30 NOTE — PROGRESS NOTE ADULT - SUBJECTIVE AND OBJECTIVE BOX
Subjective and Objective:   Pt is a 65 yo female hx of anxiety, depression , hypothyroidism , previous UTI, hx of meningioma 9/24/19. Admitted 11/24/19 with report of sheets damp and evaluation suspicious of CSF leak.  Allergy - PCN- hives          REVIEW OF SYSTEMS: [ ] Unable to Assess due to neurologic exam   [x] All ROS addressed below are non-contributory, except:  Neuro: [ ] Headache [ ] Back pain [ ] Numbness [ ] Weakness [ ] Ataxia [ ] Dizziness [ ] Aphasia [ ] Dysarthria [ ] Visual disturbance  Resp: [ ] Shortness of breath/dyspnea, [ ] Orthopnea [ ] Cough  CV: [ ] Chest pain [ ] Palpitation [ ] Lightheadedness [ ] Syncope  Renal: [ ] Thirst [ ] Edema  GI: [ ] Nausea [ ] Emesis [ ] Abdominal pain [ ] Constipation [ ] Diarrhea  Hem: [ ] Hematemesis [ ] bright red blood per rectum  ID: [ ] Fever [ ] Chills [ ] Dysuria  ENT: [ ] Rhinorrhea            ICU Vital Signs Last 24 Hrs  T(C): 36.6 (30 Nov 2019 04:00), Max: 36.6 (30 Nov 2019 04:00)  T(F): 97.8 (30 Nov 2019 04:00), Max: 97.8 (30 Nov 2019 04:00)  HR: 68 (30 Nov 2019 06:00) (53 - 75)  BP: 114/67 (30 Nov 2019 06:00) (92/57 - 124/67)  BP(mean): 81 (30 Nov 2019 06:00) (65 - 86)  ABP: --  ABP(mean): --  RR: 17 (30 Nov 2019 06:00) (9 - 22)  SpO2: 96% (30 Nov 2019 06:00) (83% - 100%)      11-29-19 @ 07:01  -  11-30-19 @ 07:00  --------------------------------------------------------  IN: 3725 mL / OUT: 1280 mL / NET: 2445 mL        acetaminophen   Tablet .. 650 milliGRAM(s) Oral every 6 hours PRN  ARIPiprazole 2 milliGRAM(s) Oral daily  atorvastatin 40 milliGRAM(s) Oral at bedtime  buPROPion XL . 300 milliGRAM(s) Oral daily  chlorhexidine 4% Liquid 1 Application(s) Topical <User Schedule>  clonazePAM  Tablet 0.5 milliGRAM(s) Oral every 8 hours  famotidine    Tablet 20 milliGRAM(s) Oral every 12 hours  folic acid 1 milliGRAM(s) Oral daily  levothyroxine 88 MICROGram(s) Oral daily  ondansetron Injectable 4 milliGRAM(s) IV Push every 6 hours PRN  ondansetron Injectable 4 milliGRAM(s) IV Push every 6 hours PRN  oxyCODONE    IR 5 milliGRAM(s) Oral every 4 hours PRN  petrolatum Ophthalmic Ointment 1 Application(s) Right EYE every 8 hours  polyethylene glycol 3350 17 Gram(s) Oral every 12 hours  senna 2 Tablet(s) Oral at bedtime PRN  sodium chloride 0.9% with potassium chloride 20 mEq/L 1000 milliLiter(s) (75 mL/Hr) IV Continuous <Continuous>                          12.1   11.25 )-----------( 237      ( 29 Nov 2019 23:16 )             37.1     11-29    138  |  104  |  13  ----------------------------<  217<H>  4.1   |  22  |  0.97    Ca    8.8      29 Nov 2019 23:16  Phos  2.5     11-29  Mg     2.0     11-29              General: No acute distress  HEENT: Anicteric sclerae  Cardiac: [id number]  Lungs: Clear  Abdomen: Soft, non-tender, +BS  Extremities: No c/c/e  Skin/Incision Site: Clean, dry and intact  Neurologic: Awake, alert, fully oriented, follows commands, PERRL, VFFtc, EOMI, face symmetric, tongue midline, no drift, full strength Subjective and Objective:   Pt is a 65 yo female hx of anxiety, depression , hypothyroidism , previous UTI, hx of meningioma 9/24/19. Admitted 11/24/19 with report of sheets damp and evaluation suspicious of CSF leak.  Allergy - PCN- hives      Surgery: 11/29: VPS Certas at 2     Overnight : no events       REVIEW OF SYSTEMS: [ ] Unable to Assess due to neurologic exam   [x] All ROS addressed below are non-contributory, except:  Neuro: [ ] Headache [ ] Back pain [ ] Numbness [ ] Weakness [ ] Ataxia [ ] Dizziness [ ] Aphasia [ ] Dysarthria [ ] Visual disturbance  Resp: [ ] Shortness of breath/dyspnea, [ ] Orthopnea [ ] Cough  CV: [ ] Chest pain [ ] Palpitation [ ] Lightheadedness [ ] Syncope  Renal: [ ] Thirst [ ] Edema  GI: [ ] Nausea [ ] Emesis [ ] Abdominal pain [ ] Constipation [ ] Diarrhea  Hem: [ ] Hematemesis [ ] bright red blood per rectum  ID: [ ] Fever [ ] Chills [ ] Dysuria  ENT: [ ] Rhinorrhea          ICU Vital Signs Last 24 Hrs  T(C): 36.6 (30 Nov 2019 04:00), Max: 36.6 (30 Nov 2019 04:00)  T(F): 97.8 (30 Nov 2019 04:00), Max: 97.8 (30 Nov 2019 04:00)  HR: 68 (30 Nov 2019 06:00) (53 - 75)  BP: 114/67 (30 Nov 2019 06:00) (92/57 - 124/67)  BP(mean): 81 (30 Nov 2019 06:00) (65 - 86)  RR: 17 (30 Nov 2019 06:00) (9 - 22)  SpO2: 96% (30 Nov 2019 06:00) (83% - 100%)      11-29-19 @ 07:01  -  11-30-19 @ 07:00  --------------------------------------------------------  IN: 3725 mL / OUT: 1280 mL / NET: 2445 mL        acetaminophen   Tablet .. 650 milliGRAM(s) Oral every 6 hours PRN  ARIPiprazole 2 milliGRAM(s) Oral daily  atorvastatin 40 milliGRAM(s) Oral at bedtime  buPROPion XL . 300 milliGRAM(s) Oral daily  chlorhexidine 4% Liquid 1 Application(s) Topical <User Schedule>  clonazePAM  Tablet 0.5 milliGRAM(s) Oral every 8 hours  famotidine    Tablet 20 milliGRAM(s) Oral every 12 hours  folic acid 1 milliGRAM(s) Oral daily  levothyroxine 88 MICROGram(s) Oral daily  ondansetron Injectable 4 milliGRAM(s) IV Push every 6 hours PRN  ondansetron Injectable 4 milliGRAM(s) IV Push every 6 hours PRN  oxyCODONE    IR 5 milliGRAM(s) Oral every 4 hours PRN  petrolatum Ophthalmic Ointment 1 Application(s) Right EYE every 8 hours  polyethylene glycol 3350 17 Gram(s) Oral every 12 hours  senna 2 Tablet(s) Oral at bedtime PRN  sodium chloride 0.9% with potassium chloride 20 mEq/L 1000 milliLiter(s) (75 mL/Hr) IV Continuous <Continuous>                          12.1   11.25 )-----------( 237      ( 29 Nov 2019 23:16 )             37.1     11-29    138  |  104  |  13  ----------------------------<  217<H>  4.1   |  22  |  0.97    Ca    8.8      29 Nov 2019 23:16  Phos  2.5     11-29  Mg     2.0     11-29          General: No acute distress  HEENT: Anicteric sclerae  Cardiac: [id number]  Lungs: Clear  Abdomen: Soft, non-tender, +BS  Extremities: No c/c/e  Skin/Incision Site: Clean, dry and intact  Neurologic: Awake, alert, fully oriented, follows commands, PERRL, VFFtc, EOMI, face symmetric, tongue midline, no drift, full strength

## 2019-11-30 NOTE — PROVIDER CONTACT NOTE (CHANGE IN STATUS NOTIFICATION) - ASSESSMENT
Pts vital signs remain within define limits, pt remains neurologically unchanged. Pt denies HA or neck pain. Pt A&Ox4 with no drifts on any extremity.

## 2019-11-30 NOTE — PROGRESS NOTE ADULT - SUBJECTIVE AND OBJECTIVE BOX
SUBJECTIVE:    Patient seen and examined, no complaints.   Reports eye feeling better following ointment.     OBJECTIVE:     ** VITAL SIGNS / I&O's **    T(C): 36.6 (11-30-19 @ 04:00), Max: 36.6 (11-30-19 @ 04:00)  T(F): 97.8 (11-30-19 @ 04:00), Max: 97.8 (11-30-19 @ 04:00)  HR: 68 (11-30-19 @ 06:00) (53 - 75)  BP: 114/67 (11-30-19 @ 06:00) (92/57 - 124/67)  RR: 17 (11-30-19 @ 06:00) (9 - 22)  SpO2: 96% (11-30-19 @ 06:00) (83% - 100%)      29 Nov 2019 07:01  -  30 Nov 2019 07:00  --------------------------------------------------------  IN:    IV PiggyBack: 500 mL    Oral Fluid: 1650 mL    sodium chloride 0.9% with potassium chloride 20 mEq/L: 1575 mL  Total IN: 3725 mL    OUT:    Voided: 1280 mL  Total OUT: 1280 mL    Total NET: 2445 mL          ** PHYSICAL EXAM **    >> General: Well-developed. Well-nourished. No acute distress.  >> Neck: Supple.  >> HEENT: Head dressing CDI, no strikethrough. EOMI, PERRL bilaterally.   >> Cardiovascular: RRR. S1, S2.  >> Lungs: Bilateral breath sounds.   >> Abdomen: Dressings X 2 CDI, abdomen soft and appropriately tender around incision sites. Soft, non distended.   >> Extremities: Unremarkable.     ** LABS **                 12.1   11.25  )----------(  237       ( 29 Nov 2019 23:16 )               37.1      138    |  104    |  13     ----------------------------<  217        ( 29 Nov 2019 23:16 )  4.1     |  22     |  0.97     Ca    8.8        ( 29 Nov 2019 23:16 )  Phos  2.5       ( 29 Nov 2019 23:16 )  Mg     2.0       ( 29 Nov 2019 23:16 )          CAPILLARY BLOOD GLUCOSE

## 2019-11-30 NOTE — PROGRESS NOTE ADULT - SUBJECTIVE AND OBJECTIVE BOX
Dressing and pillow dry and clean. Patient denies wetness on scalp, or sensation of fullness in her head. Has pain in head and abdomen. Has had flatus. Urinated in commode 4 times overnight.    Abdomen soft, slight diffuse tenderness, not distended, no rebound. Dressings dry and clean. Calves NT.    P) CT head. If looks ok may transfer out of [facility]. Will encourage her to be OOB and advance diet.

## 2019-12-01 ENCOUNTER — TRANSCRIPTION ENCOUNTER (OUTPATIENT)
Age: 64
End: 2019-12-01

## 2019-12-01 RX ORDER — BACITRACIN ZINC 500 UNIT/G
1 OINTMENT IN PACKET (EA) TOPICAL
Refills: 0 | Status: DISCONTINUED | OUTPATIENT
Start: 2019-12-01 | End: 2019-12-02

## 2019-12-01 RX ADMIN — Medication 650 MILLIGRAM(S): at 08:30

## 2019-12-01 RX ADMIN — Medication 88 MICROGRAM(S): at 06:44

## 2019-12-01 RX ADMIN — ARIPIPRAZOLE 2 MILLIGRAM(S): 15 TABLET ORAL at 12:06

## 2019-12-01 RX ADMIN — Medication 650 MILLIGRAM(S): at 14:06

## 2019-12-01 RX ADMIN — Medication 1 MILLIGRAM(S): at 12:06

## 2019-12-01 RX ADMIN — BUPROPION HYDROCHLORIDE 300 MILLIGRAM(S): 150 TABLET, EXTENDED RELEASE ORAL at 12:05

## 2019-12-01 RX ADMIN — POLYETHYLENE GLYCOL 3350 17 GRAM(S): 17 POWDER, FOR SOLUTION ORAL at 06:44

## 2019-12-01 RX ADMIN — Medication 1 APPLICATION(S): at 18:00

## 2019-12-01 RX ADMIN — Medication 650 MILLIGRAM(S): at 14:40

## 2019-12-01 RX ADMIN — FAMOTIDINE 20 MILLIGRAM(S): 10 INJECTION INTRAVENOUS at 17:58

## 2019-12-01 RX ADMIN — Medication 650 MILLIGRAM(S): at 08:03

## 2019-12-01 RX ADMIN — FAMOTIDINE 20 MILLIGRAM(S): 10 INJECTION INTRAVENOUS at 06:44

## 2019-12-01 RX ADMIN — Medication 0.5 MILLIGRAM(S): at 21:13

## 2019-12-01 RX ADMIN — Medication 650 MILLIGRAM(S): at 20:38

## 2019-12-01 RX ADMIN — Medication 650 MILLIGRAM(S): at 20:08

## 2019-12-01 RX ADMIN — ENOXAPARIN SODIUM 40 MILLIGRAM(S): 100 INJECTION SUBCUTANEOUS at 17:58

## 2019-12-01 RX ADMIN — Medication 0.5 MILLIGRAM(S): at 14:06

## 2019-12-01 RX ADMIN — Medication 0.5 MILLIGRAM(S): at 06:44

## 2019-12-01 RX ADMIN — ATORVASTATIN CALCIUM 40 MILLIGRAM(S): 80 TABLET, FILM COATED ORAL at 21:13

## 2019-12-01 NOTE — DISCHARGE NOTE NURSING/CASE MANAGEMENT/SOCIAL WORK - NSDCPEPTSTRK_GEN_ALL_CORE
Stroke support groups for patients, families, and friends/Signs and symptoms of stroke/Risk factors for stroke/Stroke education booklet/Stroke warning signs and symptoms/Call 911 for stroke/Need for follow up after discharge/Prescribed medications

## 2019-12-01 NOTE — PHYSICAL THERAPY INITIAL EVALUATION ADULT - PERTINENT HX OF CURRENT PROBLEM, REHAB EVAL
65 yo F s/p recent resection of meningioma 9/24/19, c/b leaking from incision site s/p wound revision p/w possible leak from incision site, pillow and hair wet on R side this AM, denies possibility of other source of moisture. Otherwise asymptomatic. Pt s/p lumbar drain 11/24, now awaiting plan for  shunt placement.
65 yo F s/p recent resection of meningioma 9/24/19, c/b leaking from incision site s/p wound revision p/w possible leak from incision site, pillow and hair wet on R side this AM, denies possibility of other source of moisture. Otherwise asymptomatic. Pt s/p lumbar drain 11/24, now awaiting plan for  shunt placement.

## 2019-12-01 NOTE — PROGRESS NOTE ADULT - SUBJECTIVE AND OBJECTIVE BOX
SUBJECTIVE:   Irritable from no sleep overnight . No other complaints   OVERNIGHT EVENTS: none    Vital Signs Last 24 Hrs  T(C): 36.7 (01 Dec 2019 11:28), Max: 37.1 (30 Nov 2019 23:00)  T(F): 98 (01 Dec 2019 11:28), Max: 98.8 (30 Nov 2019 23:00)  HR: 81 (01 Dec 2019 11:28) (67 - 86)  BP: 122/79 (01 Dec 2019 11:28) (112/72 - 148/89)  BP(mean): 89 (01 Dec 2019 00:00) (82 - 90)  RR: 18 (01 Dec 2019 11:28) (15 - 22)  SpO2: 96% (01 Dec 2019 11:28) (96% - 98%)    PHYSICAL EXAM:    Constitutional: No Acute Distress     Neurological: Awake alert Ox3, Speech clear Following Commands, Moving all Extremities 5/5.   Inferior frontal incision staples in place No drainage . Suboptimal healing.  R cranial shunt site staples C/D/I . Abdominal steristrips c/D/I     Pulmonary: CTA     Cardiovascular: S1, S2,+     Gastrointestinal: Soft, Non-tender, Non-distended     Extremities: No calf tenderness     Incision:   DRAINS:     LABS:                        12.1   11.25 )-----------( 237      ( 29 Nov 2019 23:16 )             37.1    11-29    138  |  104  |  13  ----------------------------<  217<H>  4.1   |  22  |  0.97        IMAGING:         MEDICATIONS:    acetaminophen   Tablet .. 650 milliGRAM(s) Oral every 6 hours PRN Temp greater or equal to 38C (100.4F), Mild Pain (1 - 3)  ARIPiprazole 2 milliGRAM(s) Oral daily  buPROPion XL . 300 milliGRAM(s) Oral daily  clonazePAM  Tablet 0.5 milliGRAM(s) Oral every 8 hours  ondansetron Injectable 4 milliGRAM(s) IV Push every 6 hours PRN Nausea and/or Vomiting  ondansetron Injectable 4 milliGRAM(s) IV Push every 6 hours PRN Nausea and/or Vomiting  oxyCODONE    IR 5 milliGRAM(s) Oral every 4 hours PRN Moderate Pain (4 - 6)  famotidine    Tablet 20 milliGRAM(s) Oral every 12 hours  polyethylene glycol 3350 17 Gram(s) Oral every 12 hours  senna 2 Tablet(s) Oral at bedtime PRN Constipation  atorvastatin 40 milliGRAM(s) Oral at bedtime  BACItracin   Ointment 1 Application(s) Topical two times a day  enoxaparin Injectable 40 milliGRAM(s) SubCutaneous <User Schedule>  folic acid 1 milliGRAM(s) Oral daily  levothyroxine 88 MICROGram(s) Oral daily      DIET:     Assessment:  Please Check When Present   []  GCS  E   V  M     Heart Failure: []Acute, [] acute on chronic , []chronic  Heart Failure:  [] Diastolic (HFpEF), [] Systolic (HFrEF), []Combined (HFpEF and HFrEF), [] RHF, [] Pulm HTN, [] Other    [] DOMINIC, [] ATN, [] AIN, [] other  [] CKD1, [] CKD2, [] CKD 3, [] CKD 4, [] CKD 5, []ESRD    Encephalopathy: [] Metabolic, [] Hepatic, [] toxic, [] Neurological, [] Other    Abnormal Nurtitional Status: [] malnurtition (see nutrition note), [ ]underweight: BMI < 19, [] morbid obesity: BMI >40, [] Cachexia    [] Sepsis  [] hypovolemic shock,[] cardiogenic shock, [] hemorrhagic shock, [] neuogenic shock  [] Acute Respiratory Failure  []Cerebral edema, [] Brain compression/ herniation,   [] Functional quadriplegia  [] Acute blood loss anemia

## 2019-12-01 NOTE — PHYSICAL THERAPY INITIAL EVALUATION ADULT - PRECAUTIONS/LIMITATIONS, REHAB EVAL
CT Head 11/24: Right frontal craniectomy and wire mesh cranioplasty is redemonstrated. Subjacent extra-axial air is slightly increased. No acute intracranial hemorrhage, mass effect, or evidence for acute territorial infarct. CT L-spine 11/24: Status post L4 and L5 total laminectomies. Status post posterior fusion of L4-S1. Hardware well-placed. No evidence for hardware fracture or loosening. Evaluation of spinal canal contents limited, markedly so at the levels of surgery. MR Head 11/25: resolving dural enhancement the frontal region and anterior interhemispheric fissure related to resection of a high midline frontal of surgery. Resolving postoperative gliosis. Resolving extra-axial air beneath the craniotomy site compared with 9/27/2019. No restricted diffusion to suggest intracranial extra-axial infection. Subarachnoid air may be related to continued CSF leak or lumbar drain.; s/p VPS insertion;

## 2019-12-01 NOTE — PROGRESS NOTE ADULT - ASSESSMENT
64-year-old right-handed woman h/o depression , hypothyroidism who underwent removal of a falx and parasagittal meningioma  s/p midline craniotomy for tumor resection the following day on 9/24/19. Course c/b   CSF leak through the "T" part of her incision with wound revision by plastics 10/25/19  now readmitted with CSF leaking from incision site. s/p Lumbar drain placement in IR 11/24/19  and s/p  shunt 11/29/19 Certas @2.0 . lumbar drain discontinued intraop    Plan    Neuro stable  Vitals stable  Incision evaluation and staple removal on f/u visit at Dr Camilo office . bacitracin to incison site  am CBC BMP  PT eval- needs stair clearance  hypothyroidism- Continue synthroid   Possible d/c home in am

## 2019-12-01 NOTE — DISCHARGE NOTE NURSING/CASE MANAGEMENT/SOCIAL WORK - PATIENT PORTAL LINK FT
You can access the FollowMyHealth Patient Portal offered by [facility] by registering at the following website: [email or web address]. By joining [facility]’s FollowMyHealth portal, you will also be able to view your health information using other applications (apps) compatible with our system.

## 2019-12-02 ENCOUNTER — TRANSCRIPTION ENCOUNTER (OUTPATIENT)
Age: 64
End: 2019-12-02

## 2019-12-02 VITALS
DIASTOLIC BLOOD PRESSURE: 84 MMHG | HEART RATE: 67 BPM | RESPIRATION RATE: 18 BRPM | SYSTOLIC BLOOD PRESSURE: 133 MMHG | TEMPERATURE: 98 F | OXYGEN SATURATION: 96 %

## 2019-12-02 DIAGNOSIS — Z82.49 FAMILY HISTORY OF ISCHEMIC HEART DISEASE AND OTHER DISEASES OF THE CIRCULATORY SYSTEM: ICD-10-CM

## 2019-12-02 LAB
ANION GAP SERPL CALC-SCNC: 13 MMOL/L — SIGNIFICANT CHANGE UP (ref 5–17)
BASOPHILS # BLD AUTO: 0.05 K/UL — SIGNIFICANT CHANGE UP (ref 0–0.2)
BASOPHILS NFR BLD AUTO: 0.7 % — SIGNIFICANT CHANGE UP (ref 0–2)
BUN SERPL-MCNC: 15 MG/DL — SIGNIFICANT CHANGE UP (ref 7–23)
CALCIUM SERPL-MCNC: 9.1 MG/DL — SIGNIFICANT CHANGE UP (ref 8.4–10.5)
CHLORIDE SERPL-SCNC: 106 MMOL/L — SIGNIFICANT CHANGE UP (ref 96–108)
CO2 SERPL-SCNC: 24 MMOL/L — SIGNIFICANT CHANGE UP (ref 22–31)
CREAT SERPL-MCNC: 1.15 MG/DL — SIGNIFICANT CHANGE UP (ref 0.5–1.3)
EOSINOPHIL # BLD AUTO: 0.4 K/UL — SIGNIFICANT CHANGE UP (ref 0–0.5)
EOSINOPHIL NFR BLD AUTO: 5.5 % — SIGNIFICANT CHANGE UP (ref 0–6)
GLUCOSE SERPL-MCNC: 88 MG/DL — SIGNIFICANT CHANGE UP (ref 70–99)
HCT VFR BLD CALC: 34 % — LOW (ref 34.5–45)
HGB BLD-MCNC: 10.9 G/DL — LOW (ref 11.5–15.5)
IMM GRANULOCYTES NFR BLD AUTO: 0.4 % — SIGNIFICANT CHANGE UP (ref 0–1.5)
LYMPHOCYTES # BLD AUTO: 1.58 K/UL — SIGNIFICANT CHANGE UP (ref 1–3.3)
LYMPHOCYTES # BLD AUTO: 21.9 % — SIGNIFICANT CHANGE UP (ref 13–44)
MCHC RBC-ENTMCNC: 28 PG — SIGNIFICANT CHANGE UP (ref 27–34)
MCHC RBC-ENTMCNC: 32.1 GM/DL — SIGNIFICANT CHANGE UP (ref 32–36)
MCV RBC AUTO: 87.4 FL — SIGNIFICANT CHANGE UP (ref 80–100)
MONOCYTES # BLD AUTO: 0.65 K/UL — SIGNIFICANT CHANGE UP (ref 0–0.9)
MONOCYTES NFR BLD AUTO: 9 % — SIGNIFICANT CHANGE UP (ref 2–14)
NEUTROPHILS # BLD AUTO: 4.51 K/UL — SIGNIFICANT CHANGE UP (ref 1.8–7.4)
NEUTROPHILS NFR BLD AUTO: 62.5 % — SIGNIFICANT CHANGE UP (ref 43–77)
NRBC # BLD: 0 /100 WBCS — SIGNIFICANT CHANGE UP (ref 0–0)
PLATELET # BLD AUTO: 227 K/UL — SIGNIFICANT CHANGE UP (ref 150–400)
POTASSIUM SERPL-MCNC: 3.9 MMOL/L — SIGNIFICANT CHANGE UP (ref 3.5–5.3)
POTASSIUM SERPL-SCNC: 3.9 MMOL/L — SIGNIFICANT CHANGE UP (ref 3.5–5.3)
RBC # BLD: 3.89 M/UL — SIGNIFICANT CHANGE UP (ref 3.8–5.2)
RBC # FLD: 12.9 % — SIGNIFICANT CHANGE UP (ref 10.3–14.5)
SODIUM SERPL-SCNC: 143 MMOL/L — SIGNIFICANT CHANGE UP (ref 135–145)
WBC # BLD: 7.22 K/UL — SIGNIFICANT CHANGE UP (ref 3.8–10.5)
WBC # FLD AUTO: 7.22 K/UL — SIGNIFICANT CHANGE UP (ref 3.8–10.5)

## 2019-12-02 PROCEDURE — 85027 COMPLETE CBC AUTOMATED: CPT

## 2019-12-02 PROCEDURE — 86901 BLOOD TYPING SEROLOGIC RH(D): CPT

## 2019-12-02 PROCEDURE — 80053 COMPREHEN METABOLIC PANEL: CPT

## 2019-12-02 PROCEDURE — 70553 MRI BRAIN STEM W/O & W/DYE: CPT

## 2019-12-02 PROCEDURE — 84157 ASSAY OF PROTEIN OTHER: CPT

## 2019-12-02 PROCEDURE — 77003 FLUOROGUIDE FOR SPINE INJECT: CPT

## 2019-12-02 PROCEDURE — 86900 BLOOD TYPING SEROLOGIC ABO: CPT

## 2019-12-02 PROCEDURE — C1889: CPT

## 2019-12-02 PROCEDURE — 82945 GLUCOSE OTHER FLUID: CPT

## 2019-12-02 PROCEDURE — 85730 THROMBOPLASTIN TIME PARTIAL: CPT

## 2019-12-02 PROCEDURE — 83735 ASSAY OF MAGNESIUM: CPT

## 2019-12-02 PROCEDURE — 70450 CT HEAD/BRAIN W/O DYE: CPT

## 2019-12-02 PROCEDURE — 86850 RBC ANTIBODY SCREEN: CPT

## 2019-12-02 PROCEDURE — 80048 BASIC METABOLIC PNL TOTAL CA: CPT

## 2019-12-02 PROCEDURE — 89051 BODY FLUID CELL COUNT: CPT

## 2019-12-02 PROCEDURE — 97161 PT EVAL LOW COMPLEX 20 MIN: CPT

## 2019-12-02 PROCEDURE — 83605 ASSAY OF LACTIC ACID: CPT

## 2019-12-02 PROCEDURE — 93970 EXTREMITY STUDY: CPT

## 2019-12-02 PROCEDURE — 72131 CT LUMBAR SPINE W/O DYE: CPT

## 2019-12-02 PROCEDURE — 85610 PROTHROMBIN TIME: CPT

## 2019-12-02 PROCEDURE — 84100 ASSAY OF PHOSPHORUS: CPT

## 2019-12-02 PROCEDURE — 87070 CULTURE OTHR SPECIMN AEROBIC: CPT

## 2019-12-02 PROCEDURE — 87205 SMEAR GRAM STAIN: CPT

## 2019-12-02 PROCEDURE — A9585: CPT

## 2019-12-02 PROCEDURE — 99285 EMERGENCY DEPT VISIT HI MDM: CPT

## 2019-12-02 RX ORDER — DOXEPIN HCL 100 MG
1 CAPSULE ORAL
Qty: 0 | Refills: 0 | DISCHARGE

## 2019-12-02 RX ORDER — BACITRACIN ZINC 500 UNIT/G
1 OINTMENT IN PACKET (EA) TOPICAL
Qty: 0 | Refills: 0 | DISCHARGE
Start: 2019-12-02

## 2019-12-02 RX ORDER — ACETAMINOPHEN 500 MG
2 TABLET ORAL
Qty: 0 | Refills: 0 | DISCHARGE
Start: 2019-12-02

## 2019-12-02 RX ADMIN — BUPROPION HYDROCHLORIDE 300 MILLIGRAM(S): 150 TABLET, EXTENDED RELEASE ORAL at 11:10

## 2019-12-02 RX ADMIN — Medication 650 MILLIGRAM(S): at 05:06

## 2019-12-02 RX ADMIN — ARIPIPRAZOLE 2 MILLIGRAM(S): 15 TABLET ORAL at 11:11

## 2019-12-02 RX ADMIN — Medication 650 MILLIGRAM(S): at 05:36

## 2019-12-02 RX ADMIN — FAMOTIDINE 20 MILLIGRAM(S): 10 INJECTION INTRAVENOUS at 05:06

## 2019-12-02 RX ADMIN — Medication 88 MICROGRAM(S): at 05:06

## 2019-12-02 RX ADMIN — Medication 0.5 MILLIGRAM(S): at 05:06

## 2019-12-02 RX ADMIN — Medication 1 MILLIGRAM(S): at 11:11

## 2019-12-02 RX ADMIN — Medication 1 APPLICATION(S): at 05:06

## 2019-12-02 NOTE — CONSULT NOTE ADULT - SUBJECTIVE AND OBJECTIVE BOX
Plastic SURGERY CONSULT NOTE    Patient is a 64y old  Female who presents with a chief complaint of CSF leak (2019 08:24)      HPI:  64F s/p recent resection of meningioma c/b leaking from incision site s/p wound revision p/w possible leak from incision site, pillow and hair wet on R side this AM, denies possibility of other source of moisture. Otherwise asymptomatic. No headache, f/c, n/v. Fluid was clear (2019 14:25)    PAST MEDICAL & SURGICAL HISTORY:  HLD (hyperlipidemia)  Thyroid nodule:   OA (osteoarthritis)  Depression with anxiety  S/P resection of meningioma:   S/P thyroidectomy:  for nodule  S/P lumbar spinal fusion:     FAMILY HISTORY:  FH: lung cancer: Mother  in her 50s  FH: premature coronary heart disease: Father  at 55 from MI    SOCIAL HISTORY:    MEDICATIONS  (STANDING):  ARIPiprazole 2 milliGRAM(s) Oral daily  atorvastatin 40 milliGRAM(s) Oral at bedtime  BACItracin   Ointment 1 Application(s) Topical two times a day  buPROPion XL . 300 milliGRAM(s) Oral daily  clonazePAM  Tablet 0.5 milliGRAM(s) Oral every 8 hours  enoxaparin Injectable 40 milliGRAM(s) SubCutaneous <User Schedule>  famotidine    Tablet 20 milliGRAM(s) Oral every 12 hours  folic acid 1 milliGRAM(s) Oral daily  levothyroxine 88 MICROGram(s) Oral daily  polyethylene glycol 3350 17 Gram(s) Oral every 12 hours    MEDICATIONS  (PRN):  acetaminophen   Tablet .. 650 milliGRAM(s) Oral every 6 hours PRN Temp greater or equal to 38C (100.4F), Mild Pain (1 - 3)  ondansetron Injectable 4 milliGRAM(s) IV Push every 6 hours PRN Nausea and/or Vomiting  ondansetron Injectable 4 milliGRAM(s) IV Push every 6 hours PRN Nausea and/or Vomiting  oxyCODONE    IR 5 milliGRAM(s) Oral every 4 hours PRN Moderate Pain (4 - 6)  senna 2 Tablet(s) Oral at bedtime PRN Constipation    Allergies    penicillin (Hives)    Intolerances    Vital Signs Last 24 Hrs  T(C): 36.6 (02 Dec 2019 07:07), Max: 37.1 (01 Dec 2019 20:35)  T(F): 97.9 (02 Dec 2019 07:07), Max: 98.7 (01 Dec 2019 20:35)  HR: 67 (02 Dec 2019 07:07) (67 - 88)  BP: 133/84 (02 Dec 2019 07:07) (105/70 - 133/84)  BP(mean): --  RR: 18 (02 Dec 2019 07:07) (18 - 18)  SpO2: 96% (02 Dec 2019 07:07) (94% - 98%)  Daily     Daily       Exam:  Gen: resting in bed comfortably, NAD  Scalp: Inferior frontal incision staples in place, covered with bacitracin; no fluctuance, erythema, or drainage                          10.9   7.22  )-----------( 227      ( 02 Dec 2019 06:00 )             34.0     12-02    143  |  106  |  15  ----------------------------<  88  3.9   |  24  |  1.15    Ca    9.1      02 Dec 2019 06:00            IMAGING STUDIES:

## 2019-12-02 NOTE — DISCHARGE NOTE PROVIDER - HOSPITAL COURSE
64-year-old right-handed woman h/o depression , hypothyroidism who underwent removal of a falx and parasagittal meningioma  s/p midline craniotomy for tumor resection the following day on 9/24/19. Course c/b   CSF leak through the "T" part of her incision with wound revision by plastics 10/25/19  now readmitted with CSF leaking from incision site. s/p Lumbar drain placement in IR 11/24/19  and s/p  shunt 11/29/19 Certas @2.0 . lumbar drain discontinued intraop.. Post op head CT stable. Evaluated by PT and cleared for discharge home      Cranial Incision from meningioma resection evaluated by plastics 12/2/19. Discharged home in stable condition 64-year-old right-handed woman h/o depression , hypothyroidism who underwent removal of a falx and parasagittal meningioma  s/p midline craniotomy for tumor resection the following day on 9/24/19. Course c/b   CSF leak through the "T" part of her incision with wound revision by plastics 10/25/19  now readmitted with CSF leaking from incision site. s/p Lumbar drain placement in IR 11/24/19 . Monitored in Neuro ICU for lumbar drainage needs .  s/p  shunt 11/29/19 Certas @2.0 . lumbar drain discontinued intraop.. Post op head CT stable. Evaluated by PT and cleared for discharge home      Cranial Incision from meningioma resection evaluated by plastics 12/2/19. Discharged home in stable condition

## 2019-12-02 NOTE — PROGRESS NOTE ADULT - SUBJECTIVE AND OBJECTIVE BOX
SUBJECTIVE:   No complaints   OVERNIGHT EVENTS: none    Vital Signs Last 24 Hrs  T(C): 36.6 (02 Dec 2019 07:07), Max: 37.1 (01 Dec 2019 20:35)  T(F): 97.9 (02 Dec 2019 07:07), Max: 98.7 (01 Dec 2019 20:35)  HR: 67 (02 Dec 2019 07:07) (67 - 88)  BP: 133/84 (02 Dec 2019 07:07) (105/70 - 133/84)  BP(mean): --  RR: 18 (02 Dec 2019 07:07) (18 - 18)  SpO2: 96% (02 Dec 2019 07:07) (94% - 98%)    PHYSICAL EXAM:    Constitutional: No Acute Distress     Neurological: Awake alert Ox3, Speech clear Following Commands, Moving all Extremities 5/5.   Inferior frontal incision staples in place No drainage . Suboptimal healing.  R cranial shunt site staples C/D/I . Abdominal steristrips c/D/I     Pulmonary: CTA     Cardiovascular: S1, S2,+     Gastrointestinal: Soft, Non-tender, Non-distended     Extremities: No calf tenderness       LABS:                        10.9   7.22  )-----------( 227      ( 02 Dec 2019 06:00 )             34.0    12-02    143  |  106  |  15  ----------------------------<  88  3.9   |  24  |  1.15    Ca    9.1      02 Dec 2019 06:00    IMAGING:         MEDICATIONS:  acetaminophen   Tablet .. 650 milliGRAM(s) Oral every 6 hours PRN Temp greater or equal to 38C (100.4F), Mild Pain (1 - 3)  ARIPiprazole 2 milliGRAM(s) Oral daily  buPROPion XL . 300 milliGRAM(s) Oral daily  clonazePAM  Tablet 0.5 milliGRAM(s) Oral every 8 hours  ondansetron Injectable 4 milliGRAM(s) IV Push every 6 hours PRN Nausea and/or Vomiting  ondansetron Injectable 4 milliGRAM(s) IV Push every 6 hours PRN Nausea and/or Vomiting  oxyCODONE    IR 5 milliGRAM(s) Oral every 4 hours PRN Moderate Pain (4 - 6)  famotidine    Tablet 20 milliGRAM(s) Oral every 12 hours  polyethylene glycol 3350 17 Gram(s) Oral every 12 hours  senna 2 Tablet(s) Oral at bedtime PRN Constipation  atorvastatin 40 milliGRAM(s) Oral at bedtime  BACItracin   Ointment 1 Application(s) Topical two times a day  enoxaparin Injectable 40 milliGRAM(s) SubCutaneous <User Schedule>  folic acid 1 milliGRAM(s) Oral daily  levothyroxine 88 MICROGram(s) Oral daily      DIET:     Assessment:  Please Check When Present   []  GCS  E   V  M     Heart Failure: []Acute, [] acute on chronic , []chronic  Heart Failure:  [] Diastolic (HFpEF), [] Systolic (HFrEF), []Combined (HFpEF and HFrEF), [] RHF, [] Pulm HTN, [] Other    [] DOMINIC, [] ATN, [] AIN, [] other  [] CKD1, [] CKD2, [] CKD 3, [] CKD 4, [] CKD 5, []ESRD    Encephalopathy: [] Metabolic, [] Hepatic, [] toxic, [] Neurological, [] Other    Abnormal Nurtitional Status: [] malnurtition (see nutrition note), [ ]underweight: BMI < 19, [] morbid obesity: BMI >40, [] Cachexia    [] Sepsis  [] hypovolemic shock,[] cardiogenic shock, [] hemorrhagic shock, [] neuogenic shock  [] Acute Respiratory Failure  []Cerebral edema, [] Brain compression/ herniation,   [] Functional quadriplegia  [] Acute blood loss anemia

## 2019-12-02 NOTE — DISCHARGE NOTE PROVIDER - CARE PROVIDERS DIRECT ADDRESSES
[email or web address]

## 2019-12-02 NOTE — DISCHARGE NOTE PROVIDER - CARE PROVIDER_API CALL
Delta Reyes)  Neurological Surgery  [address] [place] [address]  Phone: [phone]  Fax: [phone]  Follow Up Time:     Jose Banks)  Plastic Surgery  [address] [place] [address], [address] [place] [address]  Phone: [phone]  Fax: [phone]  Follow Up Time:

## 2019-12-02 NOTE — CONSULT NOTE ADULT - ASSESSMENT
Assessment: 65 y/o F w/ PMH of depression, hypothyroidism, meningioma, s/p midline craniotomy for tumor resection c/b CSF leak, s/p wound revision by plastic surgery on 10/25/19, now readmitted for CSF leak from incision site, s/p suture repair by NSx, s/p  shunt 11/29/19    Plan:  - no surgical intervention at this time  - follow up with Dr. Banks outpatient upon discharge

## 2019-12-02 NOTE — DISCHARGE NOTE PROVIDER - NSDCCPCAREPLAN_GEN_ALL_CORE_FT
PRINCIPAL DISCHARGE DIAGNOSIS  Diagnosis: CSF leak  Assessment and Plan of Treatment: s/p  shunt Certas @2.0 11/29/19   Keep Incision Clean and Dry. May shower and get incision wet 12/5/19 pat dry after. no creams or lotions on incision. No immersion baths..  Incision evaluation and staple removal at Dr Camilo office in 1 week   Abdominal steristrips to fall off by itself   No strenous activity. No heavy lifting. Do not return to work until cleared by physician. No driving until cleared by physician.  VALVE NEEDS ADJUSTMENT AFTER MRI .NO MRI UNLESS VALVE CAN BE ADJUSTED SAME DAY..        SECONDARY DISCHARGE DIAGNOSES  Diagnosis: Depression  Assessment and Plan of Treatment: Continue antidepressants as prescribed    Diagnosis: Meningioma  Assessment and Plan of Treatment: Follow up with Dr Reyes in 1-2 weeks

## 2019-12-02 NOTE — DISCHARGE NOTE PROVIDER - NSDCACTIVITY_GEN_ALL_CORE
Do not drive or operate machinery/Stairs allowed/Walking - Outdoors allowed/Walking - Indoors allowed/No heavy lifting/straining/Showering allowed

## 2019-12-02 NOTE — DISCHARGE NOTE PROVIDER - NSDCMRMEDTOKEN_GEN_ALL_CORE_FT
Abilify 2 mg oral tablet: 1 tab(s) orally once a day  acetaminophen 500 mg oral tablet: 2 tab(s) orally every 6 hours, As Needed - for moderate pain  bacitracin 500 units/g topical ointment: 1 application topically 2 times a day  clonazePAM 0.5 mg oral tablet: 1 tab(s) orally every 8 hours  folic acid 1 mg oral tablet: 1 tab(s) orally once a day  levothyroxine 88 mcg (0.088 mg) oral tablet: 1 tab(s) orally once a day  Lipitor 40 mg oral tablet: 1 tab(s) orally once a day  senna oral tablet: 2 tab(s) orally once a day (at bedtime)  Wellbutrin  mg/24 hours oral tablet, extended release: 1 tab(s) orally every 24 hours

## 2019-12-02 NOTE — PROGRESS NOTE ADULT - ASSESSMENT
64-year-old right-handed woman h/o depression , hypothyroidism who underwent removal of a falx and parasagittal meningioma  s/p midline craniotomy for tumor resection the following day on 9/24/19. Course c/b   CSF leak through the "T" part of her incision with wound revision by plastics 10/25/19  now readmitted with CSF leaking from incision site. s/p Lumbar drain placement in IR 11/24/19  and s/p  shunt 11/29/19 Certas @2.0 . lumbar drain discontinued intraop    Plan    Neuro stable  Vitals stable  Incision evaluation and staple removal on f/u visit at Dr Camilo office . bacitracin to incison site  PT - No needs   hypothyroidism- Continue synthroid   DVT ppx  D/c home today

## 2019-12-02 NOTE — DISCHARGE NOTE PROVIDER - NSDCFUADDINST_GEN_ALL_CORE_FT
Return to ER if develops drainage from incision, intractable headaches fevers, bleeding , wound drainage, uncontrolled pain, weakness of extremities, lethargy or sluggishness..  VALVE NEEDS ADJUSTMENT AFTER MRI .NO MRI UNLESS VALVE CAN BE ADJUSTED SAME DAY..

## 2019-12-03 PROBLEM — Z82.49 FAMILY HISTORY OF MYOCARDIAL INFARCTION: Status: ACTIVE | Noted: 2019-12-03

## 2019-12-03 LAB
CYTOLOGY CVX/VAG DOC THIN PREP: NORMAL
CYTOLOGY CVX/VAG DOC THIN PREP: NORMAL

## 2019-12-13 ENCOUNTER — APPOINTMENT (OUTPATIENT)
Dept: PLASTIC SURGERY | Facility: CLINIC | Age: 64
End: 2019-12-13
Payer: MEDICARE

## 2019-12-13 PROCEDURE — 99024 POSTOP FOLLOW-UP VISIT: CPT

## 2019-12-15 ENCOUNTER — FORM ENCOUNTER (OUTPATIENT)
Age: 64
End: 2019-12-15

## 2019-12-15 NOTE — PHYSICAL EXAM
[NI] : Normal [de-identified] : incision c/d/i healing well no erythema no sign of infection stitches removed

## 2019-12-15 NOTE — HISTORY OF PRESENT ILLNESS
[FreeTextEntry1] : 64-year-old female presents for followup visit status post debridement of scalp wound and scalp flap closure. Patient is doing well no fevers no drainage. Patient recently underwent shunt placement.

## 2019-12-15 NOTE — REASON FOR VISIT
[Post Op: _________] : a [unfilled] post op visit [Spouse] : spouse [FreeTextEntry1] : DOS: 10/25/2019 s/p incision of scalp.

## 2019-12-15 NOTE — PHYSICAL EXAM
[NI] : Normal [de-identified] : incision c/d/i healing well no erythema no sign of infection staples inplace

## 2019-12-15 NOTE — REASON FOR VISIT
[Spouse] : spouse [Post Op: _________] : a [unfilled] post op visit [FreeTextEntry1] : DOS: 10/25/2019 s/p incision of scalp.

## 2019-12-16 ENCOUNTER — APPOINTMENT (OUTPATIENT)
Dept: NEUROSURGERY | Facility: CLINIC | Age: 64
End: 2019-12-16
Payer: MEDICARE

## 2019-12-16 ENCOUNTER — APPOINTMENT (OUTPATIENT)
Dept: CT IMAGING | Facility: IMAGING CENTER | Age: 64
End: 2019-12-16
Payer: MEDICARE

## 2019-12-16 ENCOUNTER — OUTPATIENT (OUTPATIENT)
Dept: OUTPATIENT SERVICES | Facility: HOSPITAL | Age: 64
LOS: 1 days | End: 2019-12-16
Payer: MEDICARE

## 2019-12-16 DIAGNOSIS — Z98.890 OTHER SPECIFIED POSTPROCEDURAL STATES: Chronic | ICD-10-CM

## 2019-12-16 DIAGNOSIS — Z98.1 ARTHRODESIS STATUS: Chronic | ICD-10-CM

## 2019-12-16 DIAGNOSIS — Z86.018 PERSONAL HISTORY OF OTHER BENIGN NEOPLASM: ICD-10-CM

## 2019-12-16 PROCEDURE — 70450 CT HEAD/BRAIN W/O DYE: CPT

## 2019-12-16 PROCEDURE — 99024 POSTOP FOLLOW-UP VISIT: CPT

## 2019-12-16 PROCEDURE — 70450 CT HEAD/BRAIN W/O DYE: CPT | Mod: 26

## 2019-12-26 ENCOUNTER — INBOUND DOCUMENT (OUTPATIENT)
Age: 64
End: 2019-12-26

## 2020-01-14 ENCOUNTER — APPOINTMENT (OUTPATIENT)
Dept: PLASTIC SURGERY | Facility: CLINIC | Age: 65
End: 2020-01-14
Payer: MEDICARE

## 2020-01-14 PROCEDURE — 99024 POSTOP FOLLOW-UP VISIT: CPT

## 2020-01-14 NOTE — PHYSICAL EXAM
[NI] : Normal [de-identified] : incision c/d/i healing well no erythema no sign of infection staples removed

## 2020-01-28 ENCOUNTER — APPOINTMENT (OUTPATIENT)
Dept: PLASTIC SURGERY | Facility: CLINIC | Age: 65
End: 2020-01-28
Payer: MEDICARE

## 2020-01-28 PROCEDURE — 99024 POSTOP FOLLOW-UP VISIT: CPT

## 2020-01-28 NOTE — PHYSICAL EXAM
[NI] : Normal [de-identified] : incision c/d healing well no erythema no sign of infection 2mm opening over the hardware. cleaned with betadine 2-0 nylon applied.

## 2020-01-29 ENCOUNTER — APPOINTMENT (OUTPATIENT)
Dept: NEUROSURGERY | Facility: CLINIC | Age: 65
End: 2020-01-29
Payer: MEDICARE

## 2020-01-29 PROCEDURE — 99024 POSTOP FOLLOW-UP VISIT: CPT

## 2020-02-03 ENCOUNTER — APPOINTMENT (OUTPATIENT)
Dept: PLASTIC SURGERY | Facility: CLINIC | Age: 65
End: 2020-02-03
Payer: MEDICARE

## 2020-02-03 PROCEDURE — 99024 POSTOP FOLLOW-UP VISIT: CPT

## 2020-02-06 ENCOUNTER — APPOINTMENT (OUTPATIENT)
Dept: PLASTIC SURGERY | Facility: CLINIC | Age: 65
End: 2020-02-06

## 2020-02-11 ENCOUNTER — APPOINTMENT (OUTPATIENT)
Dept: PLASTIC SURGERY | Facility: CLINIC | Age: 65
End: 2020-02-11
Payer: MEDICARE

## 2020-02-11 PROCEDURE — 99024 POSTOP FOLLOW-UP VISIT: CPT

## 2020-02-11 NOTE — PHYSICAL EXAM
[NI] : Normal [de-identified] : incision c/d healing well no erythema no sign of infection incision closed

## 2020-02-19 ENCOUNTER — APPOINTMENT (OUTPATIENT)
Dept: PLASTIC SURGERY | Facility: CLINIC | Age: 65
End: 2020-02-19
Payer: MEDICARE

## 2020-02-19 PROCEDURE — 99213 OFFICE O/P EST LOW 20 MIN: CPT

## 2020-02-19 NOTE — HISTORY OF PRESENT ILLNESS
[FreeTextEntry1] : Macey is a 64 year old female patient who presents for a postop evaluation. She is s/p closure of a scalp wound with exposed hardware on 1/28/20 by Dr. Banks.  Patient has been doing well and denies any specific complaints or concerns.

## 2020-02-19 NOTE — PHYSICAL EXAM
[de-identified] : alert, calm, cooperative\par  [de-identified] : respirations are even and unlabored\par  [de-identified] : scalp incision is well-approximated with nylon sutures in place.  No signs of wound dehiscence, fluctuance, or exposed hardware

## 2020-02-19 NOTE — REASON FOR VISIT
[Post Op: _________] : a [unfilled] post op visit [FreeTextEntry1] : DOS: 10/25/2019 s/p debridement of scalp wound and scalp flap closure. Pt states she is doing well, denies any complaints.

## 2020-02-25 ENCOUNTER — TRANSCRIPTION ENCOUNTER (OUTPATIENT)
Age: 65
End: 2020-02-25

## 2020-02-25 ENCOUNTER — OUTPATIENT (OUTPATIENT)
Dept: OUTPATIENT SERVICES | Facility: HOSPITAL | Age: 65
LOS: 1 days | End: 2020-02-25

## 2020-02-25 ENCOUNTER — APPOINTMENT (OUTPATIENT)
Dept: PLASTIC SURGERY | Facility: CLINIC | Age: 65
End: 2020-02-25
Payer: MEDICARE

## 2020-02-25 VITALS
SYSTOLIC BLOOD PRESSURE: 140 MMHG | HEIGHT: 64 IN | RESPIRATION RATE: 15 BRPM | DIASTOLIC BLOOD PRESSURE: 90 MMHG | TEMPERATURE: 99 F | OXYGEN SATURATION: 96 % | WEIGHT: 182.1 LBS | HEART RATE: 99 BPM

## 2020-02-25 VITALS
DIASTOLIC BLOOD PRESSURE: 87 MMHG | WEIGHT: 170 LBS | HEIGHT: 64 IN | HEART RATE: 96 BPM | BODY MASS INDEX: 29.02 KG/M2 | SYSTOLIC BLOOD PRESSURE: 123 MMHG

## 2020-02-25 VITALS
RESPIRATION RATE: 15 BRPM | DIASTOLIC BLOOD PRESSURE: 90 MMHG | WEIGHT: 182.1 LBS | OXYGEN SATURATION: 96 % | HEART RATE: 99 BPM | SYSTOLIC BLOOD PRESSURE: 140 MMHG | TEMPERATURE: 99 F | HEIGHT: 64 IN

## 2020-02-25 DIAGNOSIS — Z98.890 OTHER SPECIFIED POSTPROCEDURAL STATES: ICD-10-CM

## 2020-02-25 DIAGNOSIS — Z98.890 OTHER SPECIFIED POSTPROCEDURAL STATES: Chronic | ICD-10-CM

## 2020-02-25 DIAGNOSIS — Z29.9 ENCOUNTER FOR PROPHYLACTIC MEASURES, UNSPECIFIED: ICD-10-CM

## 2020-02-25 DIAGNOSIS — E04.1 NONTOXIC SINGLE THYROID NODULE: ICD-10-CM

## 2020-02-25 DIAGNOSIS — Z98.1 ARTHRODESIS STATUS: Chronic | ICD-10-CM

## 2020-02-25 DIAGNOSIS — Z98.2 PRESENCE OF CEREBROSPINAL FLUID DRAINAGE DEVICE: Chronic | ICD-10-CM

## 2020-02-25 DIAGNOSIS — S01.00XD UNSPECIFIED OPEN WOUND OF SCALP, SUBSEQUENT ENCOUNTER: ICD-10-CM

## 2020-02-25 DIAGNOSIS — F41.8 OTHER SPECIFIED ANXIETY DISORDERS: ICD-10-CM

## 2020-02-25 LAB
ANION GAP SERPL CALC-SCNC: 14 MMOL/L — SIGNIFICANT CHANGE UP (ref 5–17)
BLD GP AB SCN SERPL QL: NEGATIVE — SIGNIFICANT CHANGE UP
BUN SERPL-MCNC: 20 MG/DL — SIGNIFICANT CHANGE UP (ref 7–23)
CALCIUM SERPL-MCNC: 10 MG/DL — SIGNIFICANT CHANGE UP (ref 8.4–10.5)
CHLORIDE SERPL-SCNC: 106 MMOL/L — SIGNIFICANT CHANGE UP (ref 96–108)
CO2 SERPL-SCNC: 25 MMOL/L — SIGNIFICANT CHANGE UP (ref 22–31)
CREAT SERPL-MCNC: 1.14 MG/DL — SIGNIFICANT CHANGE UP (ref 0.5–1.3)
GLUCOSE SERPL-MCNC: 86 MG/DL — SIGNIFICANT CHANGE UP (ref 70–99)
HCT VFR BLD CALC: 46.5 % — HIGH (ref 34.5–45)
HGB BLD-MCNC: 14.6 G/DL — SIGNIFICANT CHANGE UP (ref 11.5–15.5)
MCHC RBC-ENTMCNC: 26.9 PG — LOW (ref 27–34)
MCHC RBC-ENTMCNC: 31.4 GM/DL — LOW (ref 32–36)
MCV RBC AUTO: 85.6 FL — SIGNIFICANT CHANGE UP (ref 80–100)
NRBC # BLD: 0 /100 WBCS — SIGNIFICANT CHANGE UP (ref 0–0)
PLATELET # BLD AUTO: 355 K/UL — SIGNIFICANT CHANGE UP (ref 150–400)
POTASSIUM SERPL-MCNC: 4.7 MMOL/L — SIGNIFICANT CHANGE UP (ref 3.5–5.3)
POTASSIUM SERPL-SCNC: 4.7 MMOL/L — SIGNIFICANT CHANGE UP (ref 3.5–5.3)
RBC # BLD: 5.43 M/UL — HIGH (ref 3.8–5.2)
RBC # FLD: 13.1 % — SIGNIFICANT CHANGE UP (ref 10.3–14.5)
RH IG SCN BLD-IMP: POSITIVE — SIGNIFICANT CHANGE UP
SODIUM SERPL-SCNC: 145 MMOL/L — SIGNIFICANT CHANGE UP (ref 135–145)
WBC # BLD: 7.85 K/UL — SIGNIFICANT CHANGE UP (ref 3.8–10.5)
WBC # FLD AUTO: 7.85 K/UL — SIGNIFICANT CHANGE UP (ref 3.8–10.5)

## 2020-02-25 PROCEDURE — 99024 POSTOP FOLLOW-UP VISIT: CPT

## 2020-02-25 RX ORDER — VANCOMYCIN HCL 1 G
1250 VIAL (EA) INTRAVENOUS ONCE
Refills: 0 | Status: DISCONTINUED | OUTPATIENT
Start: 2020-02-26 | End: 2020-02-28

## 2020-02-25 NOTE — H&P PST ADULT - HISTORY OF PRESENT ILLNESS
Mrs. Fajardo is a 64 year old woman with PMH HLD, thyroid nodule s/p thyroidectomy 1999, osteoarthritis s/p spinal fusion in 2003 and gel injections to both knees and brain hemangioma s/p resection in 2003 who noted swelling on top of her head and went to PCP then neurosurgeon now scheduled for cerebral angiography with pipeline stent on 9/23/19 followed by midline craniotomy for tumor resection the following day on 9/24/19.  She denies headaches, dizziness or pain upon palpation.  She is very anxious about the procedure and felt suicidal last week but has been seen by her psychiatrist twice since and will continue regularly scheduled appointments with her. 64 year old woman with PMH HLD, thyroid nodule s/p thyroidectomy 1999, osteoarthritis s/p spinal fusion in 2003, brain hemangioma s/p resection in 2003 and 2019 for recurrence c/b CSF leak s/p VPS in 64 year old woman with PMH of anxiety and depression, brain hemangioma s/p resection in 2003 and 9/2019 for recurrence c/b CSF leak s/p VPS placement in 11/2019. Now pt states she has a non-healing wound to her previous crani incision. She denies experiencing drainage since she had the VPS placed. She has been afebrile. Presents now for removal of hardware, scalp flap with skin graft.

## 2020-02-25 NOTE — H&P PST ADULT - NSICDXPROBLEM_GEN_ALL_CORE_FT
PROBLEM DIAGNOSES  Problem: S/P resection of meningioma  Assessment and Plan: Scheduled removal of hardware and scalp flap  nasal swab collected      Problem: Thyroid nodule  Assessment and Plan: will take Synthroid morning of surgery    Problem: Depression with anxiety  Assessment and Plan: instructed to take Klonopin, wellbutrin and abilify in the AM   denies any recent episodes of suicidal ideation     Problem: Need for prophylactic measure  Assessment and Plan: The Caprini score indicates this patient is at risk for a VTE event (score 3-5).  Most surgical patients in this group would benefit from pharmacologic prophylaxis.  The surgical team will determine the balance between VTE risk and bleeding risk

## 2020-02-25 NOTE — H&P PST ADULT - NEGATIVE NEUROLOGICAL SYMPTOMS
no weakness/no generalized seizures/no difficulty walking/no confusion/no headache/no facial palsy/no focal seizures/no hemiparesis

## 2020-02-25 NOTE — H&P PST ADULT - NSICDXPASTSURGICALHX_GEN_ALL_CORE_FT
PAST SURGICAL HISTORY:  S/P lumbar spinal fusion 2003    S/P resection of meningioma 2003, 2019    S/P thyroidectomy 1999 for nodule PAST SURGICAL HISTORY:  S/P lumbar spinal fusion 2003    S/P resection of meningioma 2003, 2019    S/P thyroidectomy 1999 for nodule    S/P  shunt 2020

## 2020-02-25 NOTE — H&P PST ADULT - PRO INTERPRETER NEED 2
Subjective:       Patient ID: Regina Goodwin is a 41 y.o. female.    Chief Complaint: Follow-up    HPI   Patient is a 42 year old female presenting to the clinic for 3 month f/u refill on Adderall 20mg BID for her ADD. Patient reports that her dose has stabilized and she is doing well at this time. SHe denies any concerns with weight loss or weight gain. She denies any palpitations, chest pains, difficulty concentrating.   Review of Systems   Constitutional: Negative for activity change, appetite change, chills, diaphoresis, fatigue and fever.   HENT: Negative for congestion, postnasal drip and rhinorrhea.    Respiratory: Negative.  Negative for cough, shortness of breath and wheezing.    Cardiovascular: Negative.  Negative for chest pain.   Gastrointestinal: Negative for abdominal pain, blood in stool, constipation, diarrhea, nausea and vomiting.   Genitourinary: Negative for dysuria, frequency, hematuria and urgency.   Musculoskeletal: Negative.    Skin: Negative.  Negative for color change and rash.   Neurological: Negative for dizziness and syncope.   Psychiatric/Behavioral: Negative for agitation, behavioral problems and confusion.       Objective:      Physical Exam   Constitutional: Vital signs are normal. She appears well-developed and well-nourished. No distress.   Cardiovascular: Normal rate, regular rhythm, S1 normal, S2 normal and normal heart sounds. Exam reveals no gallop.   No murmur heard.  Pulses:       Radial pulses are 2+ on the right side, and 2+ on the left side.   <2sec cap refill fingers bilat     Pulmonary/Chest: Effort normal and breath sounds normal. No respiratory distress. She has no wheezes. She has no rhonchi.   Skin: Skin is warm and dry. She is not diaphoretic.   Appropriate skin turgor   Psychiatric: She has a normal mood and affect. Her speech is normal and behavior is normal. Judgment and thought content normal. Cognition and memory are normal.       Assessment:       1.  Attention deficit disorder, unspecified hyperactivity presence    2. Screening for breast cancer        Plan:       Regina was seen today for follow-up.    Diagnoses and all orders for this visit:    Attention deficit disorder, unspecified hyperactivity presence   dextroamphetamine-amphetamine (ADDERALL) 20 mg tablet; Take 1 tablet by mouth 2 (two) times daily.  -     dextroamphetamine-amphetamine (ADDERALL) 20 mg tablet; Take 1 tablet by mouth 2 (two) times daily.  -     dextroamphetamine-amphetamine (ADDERALL) 20 mg tablet; Take 1 tablet by mouth 2 (two) times daily.  3 month f/u scheduled with Dr. Palm    Screening for breast cancer  Mammogram orders in- to schedule today           English

## 2020-02-25 NOTE — H&P PST ADULT - ASSESSMENT
CAPRINI SCORE [CLOT updated 18]    AGE RELATED RISK FACTORS                                                       MOBILITY RELATED FACTORS  [ ] Age 41-60 years                                            (1 Point)                    [ ] Bed rest                                                        (1 Point)  [ ] Age: 61-74 years                                           (2 Points)                  [ ] Plaster cast                                                   (2 Points)  [ ] Age= 75 years                                              (3 Points)                    [ ] Bed bound for more than 72 hours                 (2 Points)    DISEASE RELATED RISK FACTORS                                               GENDER SPECIFIC FACTORS  [ ] Edema in the lower extremities                       (1 Point)              [ ] Pregnancy                                                     (1 Point)  [ ] Varicose veins                                               (1 Point)                     [ ] Post-partum < 6 weeks                                   (1 Point)             [ ] BMI > 25 Kg/m2                                            (1 Point)                     [ ] Hormonal therapy  or oral contraception          (1 Point)                 [ ] Sepsis (in the previous month)                        (1 Point)               [ ] History of pregnancy complications                 (1 point)  [ ] Pneumonia or serious lung disease                                               [ ] Unexplained or recurrent                     (1 Point)           (in the previous month)                               (1 Point)  [ ] Abnormal pulmonary function test                     (1 Point)                 SURGERY RELATED RISK FACTORS  [ ] Acute myocardial infarction                              (1 Point)               [ ]  Section                                             (1 Point)  [ ] Congestive heart failure (in the previous month)  (1 Point)      [ ] Minor surgery                                                  (1 Point)   [ ] Inflammatory bowel disease                             (1 Point)               [ ] Arthroscopic surgery                                        (2 Points)  [ ] Central venous access                                      (2 Points)                [ ] General surgery lasting more than 45 minutes (2 points)  [ ] Present or previous malignancy                     (2 Points)                [ ] Elective arthroplasty                                         (5 points)    [ ] Stroke (in the previous month)                          (5 Points)                                                                                                                                                           HEMATOLOGY RELATED FACTORS                                                 TRAUMA RELATED RISK FACTORS  [ ] Prior episodes of VTE                                     (3 Points)                [ ] Fracture of the hip, pelvis, or leg                       (5 Points)  [ ] Positive family history for VTE                         (3 Points)             [ ] Acute spinal cord injury (in the previous month)  (5 Points)  [ ] Prothrombin 16867 A                                     (3 Points)               [ ] Paralysis  (less than 1 month)                             (5 Points)  [ ] Factor V Leiden                                             (3 Points)                  [ ] Multiple Trauma within 1 month                        (5 Points)  [ ] Lupus anticoagulants                                     (3 Points)                                                           [ ] Anticardiolipin antibodies                               (3 Points)                                                       [ ] High homocysteine in the blood                      (3 Points)                                             [ ] Other congenital or acquired thrombophilia      (3 Points)                                                [ ] Heparin induced thrombocytopenia                  (3 Points)                                     Total Score [   4       ]

## 2020-02-25 NOTE — H&P PST ADULT - NSICDXPASTMEDICALHX_GEN_ALL_CORE_FT
PAST MEDICAL HISTORY:  Depression with anxiety     HLD (hyperlipidemia)     OA (osteoarthritis)     S/P resection of meningioma 2003, 2019    Thyroid nodule 1999

## 2020-02-26 ENCOUNTER — RESULT REVIEW (OUTPATIENT)
Age: 65
End: 2020-02-26

## 2020-02-26 ENCOUNTER — APPOINTMENT (OUTPATIENT)
Dept: NEUROSURGERY | Facility: HOSPITAL | Age: 65
End: 2020-02-26

## 2020-02-26 ENCOUNTER — INPATIENT (INPATIENT)
Facility: HOSPITAL | Age: 65
LOS: 1 days | Discharge: HOME | DRG: 905 | End: 2020-02-28
Attending: PLASTIC SURGERY | Admitting: PLASTIC SURGERY
Payer: MEDICARE

## 2020-02-26 DIAGNOSIS — Z98.890 OTHER SPECIFIED POSTPROCEDURAL STATES: Chronic | ICD-10-CM

## 2020-02-26 DIAGNOSIS — Z98.2 PRESENCE OF CEREBROSPINAL FLUID DRAINAGE DEVICE: Chronic | ICD-10-CM

## 2020-02-26 DIAGNOSIS — S01.00XD UNSPECIFIED OPEN WOUND OF SCALP, SUBSEQUENT ENCOUNTER: ICD-10-CM

## 2020-02-26 DIAGNOSIS — T84.498A OTHER MECHANICAL COMPLICATION OF OTHER INTERNAL ORTHOPEDIC DEVICES, IMPLANTS AND GRAFTS, INITIAL ENCOUNTER: ICD-10-CM

## 2020-02-26 DIAGNOSIS — Z98.1 ARTHRODESIS STATUS: Chronic | ICD-10-CM

## 2020-02-26 LAB
GLUCOSE BLDC GLUCOMTR-MCNC: 111 MG/DL — HIGH (ref 70–99)
GRAM STN FLD: SIGNIFICANT CHANGE UP
MRSA PCR RESULT.: SIGNIFICANT CHANGE UP
S AUREUS DNA NOSE QL NAA+PROBE: SIGNIFICANT CHANGE UP
SPECIMEN SOURCE: SIGNIFICANT CHANGE UP

## 2020-02-26 PROCEDURE — 70450 CT HEAD/BRAIN W/O DYE: CPT | Mod: 26

## 2020-02-26 PROCEDURE — 62143 RPL B1 FLP/PROSTC PLATE SKL: CPT | Mod: 79

## 2020-02-26 PROCEDURE — 99222 1ST HOSP IP/OBS MODERATE 55: CPT | Mod: GC

## 2020-02-26 PROCEDURE — 14021 TIS TRNFR S/A/L 10.1-30 SQCM: CPT

## 2020-02-26 PROCEDURE — 88304 TISSUE EXAM BY PATHOLOGIST: CPT | Mod: 26

## 2020-02-26 PROCEDURE — 62141 CRNOP SKULL DEFECT>5 CM DIAM: CPT

## 2020-02-26 PROCEDURE — 20525 RMVL FB MUSC/TDN DEEP/COMP: CPT

## 2020-02-26 PROCEDURE — 15220 FTH/GFT FR S/A/L 20 SQ CM/<: CPT

## 2020-02-26 RX ORDER — OXYCODONE HYDROCHLORIDE 5 MG/1
10 TABLET ORAL EVERY 6 HOURS
Refills: 0 | Status: DISCONTINUED | OUTPATIENT
Start: 2020-02-26 | End: 2020-02-28

## 2020-02-26 RX ORDER — CLONAZEPAM 1 MG
1 TABLET ORAL
Refills: 0 | Status: DISCONTINUED | OUTPATIENT
Start: 2020-02-26 | End: 2020-02-28

## 2020-02-26 RX ORDER — OXYCODONE HYDROCHLORIDE 5 MG/1
5 TABLET ORAL EVERY 4 HOURS
Refills: 0 | Status: DISCONTINUED | OUTPATIENT
Start: 2020-02-26 | End: 2020-02-28

## 2020-02-26 RX ORDER — LIDOCAINE HCL 20 MG/ML
0.2 VIAL (ML) INJECTION ONCE
Refills: 0 | Status: DISCONTINUED | OUTPATIENT
Start: 2020-02-26 | End: 2020-02-26

## 2020-02-26 RX ORDER — ARIPIPRAZOLE 15 MG/1
2 TABLET ORAL DAILY
Refills: 0 | Status: DISCONTINUED | OUTPATIENT
Start: 2020-02-26 | End: 2020-02-28

## 2020-02-26 RX ORDER — ACETAMINOPHEN 500 MG
650 TABLET ORAL EVERY 6 HOURS
Refills: 0 | Status: DISCONTINUED | OUTPATIENT
Start: 2020-02-26 | End: 2020-02-28

## 2020-02-26 RX ORDER — CHLORHEXIDINE GLUCONATE 213 G/1000ML
1 SOLUTION TOPICAL ONCE
Refills: 0 | Status: DISCONTINUED | OUTPATIENT
Start: 2020-02-26 | End: 2020-02-26

## 2020-02-26 RX ORDER — CALCIUM CARBONATE 500(1250)
1 TABLET ORAL EVERY 4 HOURS
Refills: 0 | Status: DISCONTINUED | OUTPATIENT
Start: 2020-02-26 | End: 2020-02-28

## 2020-02-26 RX ORDER — ENOXAPARIN SODIUM 100 MG/ML
40 INJECTION SUBCUTANEOUS DAILY
Refills: 0 | Status: DISCONTINUED | OUTPATIENT
Start: 2020-02-26 | End: 2020-02-28

## 2020-02-26 RX ORDER — BENZOCAINE AND MENTHOL 5; 1 G/100ML; G/100ML
1 LIQUID ORAL
Refills: 0 | Status: DISCONTINUED | OUTPATIENT
Start: 2020-02-26 | End: 2020-02-28

## 2020-02-26 RX ORDER — BUPROPION HYDROCHLORIDE 150 MG/1
300 TABLET, EXTENDED RELEASE ORAL DAILY
Refills: 0 | Status: DISCONTINUED | OUTPATIENT
Start: 2020-02-26 | End: 2020-02-28

## 2020-02-26 RX ORDER — ONDANSETRON 8 MG/1
4 TABLET, FILM COATED ORAL EVERY 4 HOURS
Refills: 0 | Status: DISCONTINUED | OUTPATIENT
Start: 2020-02-26 | End: 2020-02-28

## 2020-02-26 RX ORDER — TOBRAMYCIN 0.3 %
1 DROPS OPHTHALMIC (EYE) EVERY 4 HOURS
Refills: 0 | Status: COMPLETED | OUTPATIENT
Start: 2020-02-26 | End: 2020-02-27

## 2020-02-26 RX ORDER — SODIUM CHLORIDE 9 MG/ML
3 INJECTION INTRAMUSCULAR; INTRAVENOUS; SUBCUTANEOUS EVERY 8 HOURS
Refills: 0 | Status: DISCONTINUED | OUTPATIENT
Start: 2020-02-26 | End: 2020-02-26

## 2020-02-26 RX ORDER — ONDANSETRON 8 MG/1
4 TABLET, FILM COATED ORAL ONCE
Refills: 0 | Status: DISCONTINUED | OUTPATIENT
Start: 2020-02-26 | End: 2020-02-26

## 2020-02-26 RX ORDER — LEVOTHYROXINE SODIUM 125 MCG
88 TABLET ORAL DAILY
Refills: 0 | Status: DISCONTINUED | OUTPATIENT
Start: 2020-02-26 | End: 2020-02-28

## 2020-02-26 RX ORDER — VANCOMYCIN HCL 1 G
1000 VIAL (EA) INTRAVENOUS EVERY 12 HOURS
Refills: 0 | Status: DISCONTINUED | OUTPATIENT
Start: 2020-02-26 | End: 2020-02-28

## 2020-02-26 RX ORDER — SODIUM CHLORIDE 9 MG/ML
1000 INJECTION, SOLUTION INTRAVENOUS
Refills: 0 | Status: DISCONTINUED | OUTPATIENT
Start: 2020-02-26 | End: 2020-02-27

## 2020-02-26 RX ORDER — HYDROMORPHONE HYDROCHLORIDE 2 MG/ML
0.5 INJECTION INTRAMUSCULAR; INTRAVENOUS; SUBCUTANEOUS
Refills: 0 | Status: DISCONTINUED | OUTPATIENT
Start: 2020-02-26 | End: 2020-02-26

## 2020-02-26 RX ADMIN — Medication 650 MILLIGRAM(S): at 23:41

## 2020-02-26 RX ADMIN — Medication 1 DROP(S): at 22:27

## 2020-02-26 RX ADMIN — Medication 650 MILLIGRAM(S): at 23:11

## 2020-02-26 RX ADMIN — Medication 650 MILLIGRAM(S): at 17:59

## 2020-02-26 RX ADMIN — Medication 250 MILLIGRAM(S): at 17:51

## 2020-02-26 RX ADMIN — Medication 650 MILLIGRAM(S): at 17:50

## 2020-02-26 RX ADMIN — Medication 1 MILLIGRAM(S): at 22:34

## 2020-02-26 RX ADMIN — ENOXAPARIN SODIUM 40 MILLIGRAM(S): 100 INJECTION SUBCUTANEOUS at 22:27

## 2020-02-26 NOTE — CONSULT NOTE ADULT - ASSESSMENT
Mrs Fajardo is a 64 year old woman with history of meningioma resected in 2003 then recurred and resected in 9/2019, then developed a CSF leak requiring placement of a  shunt, and most recently had wound healing problems leading to exposed hardware. She is now POD #0 for removal of exposed hardware and skin graft to her skull. Given her past cultures, will broadly cover her until cultures sent from the OR are complete. 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 She is now POD #0 for removal of exposed hardware and skin graft to her skull. Given her past cultures, will broadly cover her until cultures sent from the OR are complete. 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

## 2020-02-26 NOTE — BRIEF OPERATIVE NOTE - NSICDXBRIEFPROCEDURE_GEN_ALL_CORE_FT
PROCEDURES:  Adjacent tissue transfer involving large defect; (30 sq cm) 26-Feb-2020 11:57:52  Augustus Manrique

## 2020-02-26 NOTE — CONSULT NOTE ADULT - SUBJECTIVE AND OBJECTIVE BOX
INFECTIOUS DISEASE SERVICE INITIAL CONSULTATION NOTE    HPI:  Mrs Fajardo is a 64 year old woman with history of meningioma resected in  then recurred and resected in 2019. She then developed a CSF leak that self resolved. She went for wound debridement in 10/2019(culture grew p. aeruginosa and p. flourescens), discharged on Bactrim, but changed via telephone to Cipro for 14 days. Her CSF leak recurred in 2019, so a  shunt was placed. Since then, she has had a non-healing scalp wound. On , she was noted to have exposed scalp hardware. Today, she presented for removal of hardware and skin graft.   ID consulted for antibiotic management.   Currently, the patient is feeling well. She is still a little numb on her scalp. She denies recent fevers, chills, chest pain, nausea, vomiting, abdominal pain, diarrhea, dysuria, or rash.       PAST MEDICAL & SURGICAL HISTORY:  S/P resection of meningioma: ,   HLD (hyperlipidemia)  Thyroid nodule:   OA (osteoarthritis)  Depression with anxiety  S/P  shunt:   S/P resection of meningioma: ,   S/P thyroidectomy:  for nodule  S/P lumbar spinal fusion:       REVIEW OF SYSTEMS:  Constitutional: no weakness, no fevers, no chills  Dermatologic: no rash  Respiratory: no SOB, no cough  Cardiovascular: no chest pain, no palpitations  Gastrointestinal: no nausea, no vomiting, no diarrhea  Genitourinary: no dysuria, no urinary frequency, no hematuria, no urinary retention  Musculoskeletal:	no weakness, no joint swelling/pain  Neurological: no focal weakness or numbness  Endocrine: no polyuria, no polydipsia    ACTIVE ANTIMICROBIAL/ANTIBIOTIC MEDICATIONS:  vancomycin  IVPB 1000 milliGRAM(s) IV Intermittent every 12 hours  vancomycin  IVPB 1250 milliGRAM(s) IV Intermittent once      OTHER MEDICATIONS:  acetaminophen   Tablet .. 650 milliGRAM(s) Oral every 6 hours  ARIPiprazole 2 milliGRAM(s) Oral daily  benzocaine 15 mG/menthol 3.6 mG (Sugar-Free) Lozenge 1 Lozenge Oral every 3 hours PRN  buPROPion XL . 300 milliGRAM(s) Oral daily  calcium carbonate    500 mG (Tums) Chewable 1 Tablet(s) Chew every 4 hours PRN  clonazePAM  Tablet 1 milliGRAM(s) Oral two times a day PRN  enoxaparin Injectable 40 milliGRAM(s) SubCutaneous daily  HYDROmorphone  Injectable 0.5 milliGRAM(s) IV Push every 10 minutes PRN  lactated ringers. 1000 milliLiter(s) IV Continuous <Continuous>  levothyroxine 88 MICROGram(s) Oral daily  ondansetron Injectable 4 milliGRAM(s) IV Push once PRN  ondansetron Injectable 4 milliGRAM(s) IV Push every 4 hours PRN  oxyCODONE    IR 5 milliGRAM(s) Oral every 4 hours PRN  oxyCODONE    IR 10 milliGRAM(s) Oral every 6 hours PRN      ALLERGIES:  Allergies    penicillin (Hives) when patient was a child, tolerated Keflex in the past    Intolerances        SOCIAL HISTORY: Nonsmoker, some wine, no drugs, retired     FAMILY HISTORY:  FH: lung cancer: Mother  at 55  FH: premature coronary heart disease: Father  at 55 from MI      VITAL SIGNS:  ICU Vital Signs Last 24 Hrs  T(C): 36.4 (2020 16:00), Max: 37.1 (2020 16:57)  T(F): 97.5 (2020 16:00), Max: 98.7 (2020 16:57)  HR: 81 (2020 16:00) (72 - 99)  BP: 105/74 (2020 16:00) (99/67 - 140/90)  BP(mean): 85 (2020 16:00) (79 - 106)  ABP: --  ABP(mean): --  RR: 16 (2020 16:00) (15 - 18)  SpO2: 98% (2020 16:00) (94% - 98%)      PHYSICAL EXAM:  Constitutional: WDWN  Head: in post-surgical dressing hat in place  Eyes: anicteric sclera  ENMT: no rhinorrhea; no oropharyngeal lesions, erythema, or exudates	  Neck: supple; no JVD or LAD  Respiratory: CTA B/L  Cardiovascular: +S1/S2, RRR; no appreciable murmurs  Gastrointestinal: soft, NT/ND; +BSx4, no HSM  Extremities: WWP; no clubbing, cyanosis, or edema  Dermatologic: skin warm and dry; no visible rashes or lesions  Neurologic: AAOx3; no focal deficits    LABS:                        14.6   7.85  )-----------( 355      ( 2020 19:25 )             46.5         145  |  106  |  20  ----------------------------<  86  4.7   |  25  |  1.14    Ca    10.0      2020 19:25            MICROBIOLOGY:   Tissue sent for culture to OR    Organism Identification: Pseudomonas aeruginosa  Pseudomonas fluorescens (Carbapenem Resistant) (10.26.19 @ 01:36)    Organism: Pseudomonas aeruginosa (10.26.19 @ 01:36)        RADIOLOGY & ADDITIONAL STUDIES:    < from: CT Head No Cont (20 @ 15:33) >  EXAM:  CT BRAIN                            PROCEDURE DATE:  2020            INTERPRETATION:  CLINICAL INDICATION: Meningioma, status post removal replacement of shunt hardware in OR on 2020    Technique: Noncontrast CT of the head was performed.    Multiple contiguous axial images were acquired from the skull base to the vertex without the administration of intravenous contrast. Coronal and sagittal reformations were made.    COMPARISON: Head CT, 2019 and prior    FINDINGS:    Unchanged bifrontal craniectomy and mesh cranioplasty with left-sided extension and right parietal raulito hole, redemonstration of a right ventriculoperitoneal shunt catheter, with tip extending into the left lateral ventricle frontal horn. Unchanged slight asymmetric enlargement of the left lateral ventricle frontal horn, and minimal 3 mm rightward shift of septum pellucidum, the right lateral ventricle remains slitlike and unchanged, third ventricle and fourth ventricle remain unremarkable and midline. There is redemonstration of unchanged bifrontal encephalomalacia, remaining ventricles and sulci are unchanged and unremarkable in size from prior, there is minimal microvascular disease, there is no new large extra-axial hemorrhagic collection or midline shift. Basal cisterns remain patent.    Unchanged embolic material with metallic density status post right middle meningeal artery embolization again causing streak artifact.    Interval placement of a scalp drainage catheter with scalp soft tissue swelling at vertex, and suboccipital subcutaneous emphysema. Intraorbital compartments, paranasal sinuses and tympanomastoid cavities appear free of acute disease.    IMPRESSION:    Status post, new right ventriculoperitoneal shunt catheter, ventricles size is unchanged, interval scalp soft tissue swelling and scalp drainage catheter. Unchanged bifrontal craniectomy and mesh cranioplasty with bifrontal encephalomalacia, no new large hemorrhage or shift. If symptoms persist consider follow-up head CT or MR if no contraindications                    MEGHA DOW M.D., ATTENDING RADIOLOGIST  This document has been electronically signed. 2020  3:46PM        < end of copied text > INFECTIOUS DISEASE SERVICE INITIAL CONSULTATION NOTE    HPI:  Mrs Fajardo is a 64 year old woman with history of meningioma resected in  then recurred and resected in 2019. She then developed a CSF leak that self resolved. She went for wound debridement in 10/2019(culture grew p. aeruginosa and p. flourescens), discharged on Bactrim, but changed via telephone to Cipro for 14 days. Her CSF leak recurred in 2019, so a  shunt was placed. Since then, she has had a non-healing scalp wound. On , she was noted to have exposed scalp hardware. Today, she presented for removal of hardware and skin graft.     ID consulted for antibiotic management.     Currently, the patient is feeling well. She is still a little numb on her scalp. She denies recent fevers, chills, chest pain, nausea, vomiting, abdominal pain, diarrhea, dysuria, or rash.     PAST MEDICAL & SURGICAL HISTORY:  S/P resection of meningioma: ,   HLD (hyperlipidemia)  Thyroid nodule:   OA (osteoarthritis)  Depression with anxiety  S/P  shunt:   S/P resection of meningioma: ,   S/P thyroidectomy:  for nodule  S/P lumbar spinal fusion:     REVIEW OF SYSTEMS:  Constitutional: no weakness, no fevers, no chills  Dermatologic: no rash  Respiratory: no SOB, no cough  Cardiovascular: no chest pain, no palpitations  Gastrointestinal: no nausea, no vomiting, no diarrhea  Genitourinary: no dysuria, no urinary frequency, no hematuria, no urinary retention  Musculoskeletal:	no weakness, no joint swelling/pain  Neurological: no focal weakness or numbness  Endocrine: no polyuria, no polydipsia    ACTIVE ANTIMICROBIAL:  vancomycin  IVPB 1000 milliGRAM(s) IV Intermittent every 12 hours    OTHER MEDICATIONS:  acetaminophen   Tablet .. 650 milliGRAM(s) Oral every 6 hours  ARIPiprazole 2 milliGRAM(s) Oral daily  benzocaine 15 mG/menthol 3.6 mG (Sugar-Free) Lozenge 1 Lozenge Oral every 3 hours PRN  buPROPion XL . 300 milliGRAM(s) Oral daily  calcium carbonate    500 mG (Tums) Chewable 1 Tablet(s) Chew every 4 hours PRN  clonazePAM  Tablet 1 milliGRAM(s) Oral two times a day PRN  enoxaparin Injectable 40 milliGRAM(s) SubCutaneous daily  HYDROmorphone  Injectable 0.5 milliGRAM(s) IV Push every 10 minutes PRN  lactated ringers. 1000 milliLiter(s) IV Continuous <Continuous>  levothyroxine 88 MICROGram(s) Oral daily  ondansetron Injectable 4 milliGRAM(s) IV Push once PRN  ondansetron Injectable 4 milliGRAM(s) IV Push every 4 hours PRN  oxyCODONE    IR 5 milliGRAM(s) Oral every 4 hours PRN  oxyCODONE    IR 10 milliGRAM(s) Oral every 6 hours PRN    Allergies:  penicillin (Hives) when patient was a child, tolerated Keflex in the past    SOCIAL HISTORY: Nonsmoker, some wine, no drugs, retired     FAMILY HISTORY:  FH: lung cancer: Mother  at 55  FH: premature coronary heart disease: Father  at 55 from MI    VITAL SIGNS:  ICU Vital Signs Last 24 Hrs  T(C): 36.4 (2020 16:00), Max: 37.1 (2020 16:57)  T(F): 97.5 (2020 16:00), Max: 98.7 (2020 16:57)  HR: 81 (2020 16:00) (72 - 99)  BP: 105/74 (2020 16:00) (99/67 - 140/90)  BP(mean): 85 (2020 16:00) (79 - 106)  RR: 16 (2020 16:00) (15 - 18)  SpO2: 98% (2020 16:00) (94% - 98%)    PHYSICAL EXAM:  Constitutional: WDWN  Head: in post-surgical dressing hat in place  Eyes: anicteric sclera  ENMT: no rhinorrhea; no oropharyngeal lesions, erythema, or exudates	  Neck: supple; no JVD or LAD  Respiratory: CTA B/L  Cardiovascular: +S1/S2, RRR; no appreciable murmurs  Gastrointestinal: soft, NT/ND; +BSx4, no HSM  Extremities: WWP; no clubbing, cyanosis, or edema  Dermatologic: skin warm and dry; no visible rashes or lesions  Neurologic: AAOx3; no focal deficits                      14.6   7.85  )-----------( 355      ( 2020 19:25 )             46.5     145  |  106  |  20  ----------------------------<  86  4.7   |  25  |  1.14    Ca    10.0      2020 19:25    MICROBIOLOGY:   Tissue sent for culture to OR  Organism Identification: Pseudomonas aeruginosa  Pseudomonas fluorescens (Carbapenem Resistant) (10.26.19 @ 01:36)  Organism: Pseudomonas aeruginosa (10.26.19 @ 01:36)    RADIOLOGY & ADDITIONAL STUDIES:  CT Head No Cont (20 @ 15:33) >  Unchanged bifrontal craniectomy and mesh cranioplasty with left-sided extension and right parietal raulito hole, redemonstration of a right ventriculoperitoneal shunt catheter, with tip extending into the left lateral ventricle frontal horn. Unchanged slight asymmetric enlargement of the left lateral ventricle frontal horn, and minimal 3 mm rightward shift of septum pellucidum, the right lateral ventricle remains slitlike and unchanged, third ventricle and fourth ventricle remain unremarkable and midline. There is redemonstration of unchanged bifrontal encephalomalacia, remaining ventricles and sulci are unchanged and unremarkable in size from prior, there is minimal microvascular disease, there is no new large extra-axial hemorrhagic collection or midline shift. Basal cisterns remain patent.    Unchanged embolic material with metallic density status post right middle meningeal artery embolization again causing streak artifact.    Interval placement of a scalp drainage catheter with scalp soft tissue swelling at vertex, and suboccipital subcutaneous emphysema. Intraorbital compartments, paranasal sinuses and tympanomastoid cavities appear free of acute disease.    IMPRESSION:  Status post, new right ventriculoperitoneal shunt catheter, ventricles size is unchanged, interval scalp soft tissue swelling and scalp drainage catheter. Unchanged bifrontal craniectomy and mesh cranioplasty with bifrontal encephalomalacia, no new large hemorrhage or shift. If symptoms persist consider follow-up head CT or MR if no contraindications

## 2020-02-26 NOTE — BRIEF OPERATIVE NOTE - OPERATION/FINDINGS
Removal of exposed hardware from skull.  Elevated of scalp flaps.  [place] of FTSG from R groin and back grafting to skull.  Placement of flat ENMA drain below scalp flaps

## 2020-02-26 NOTE — PATIENT PROFILE ADULT - TRANSPORTATION
.dictate
Date of Service: 02/04/2019    SUBJECTIVE:  The patient, at her 's visit, HAD asked me to look at her rectum since she said there was something coming out of it, which was disturbing her. PHYSICAL EXAMINATION:    On examination her rectal exam itself was normal.  Her anoscopic examination, however, showed a prolapsing internal hemorrhoid. I recommended seeing Dr. Jamal Vergara for care of it. Dictated By: Katelyn Robert MD  Signing Provider: Katelyn Flores.  MD LISE Wells/jag ([id number])  DD: 02/21/2019 13:08:08 TD: 02/22/2019 09:46:35    Copy Sent To:
Health Maintenance Due   Topic Date Due   â¢ Depression Screening  02/23/1953   â¢ DTaP/Tdap/Td Vaccine (1 - Tdap) 02/23/1958   â¢ Shingles Vaccine (2 of 3) 10/01/2008   â¢ Medicare Wellness 65+  05/19/2016       Patient declines the above topics at this time, thank you. Unaddressed Risk Adjusted [address] Categories and Diagnoses  HCC 12 - Breast, Prostate, Other Cancers & Tumors   Unaddressed Dx: Malignant Neoplasm Of Female Breast (Cms/Hcc)
no

## 2020-02-27 PROCEDURE — 99232 SBSQ HOSP IP/OBS MODERATE 35: CPT

## 2020-02-27 RX ORDER — CEFEPIME 1 G/1
2000 INJECTION, POWDER, FOR SOLUTION INTRAMUSCULAR; INTRAVENOUS EVERY 8 HOURS
Refills: 0 | Status: DISCONTINUED | OUTPATIENT
Start: 2020-02-27 | End: 2020-02-28

## 2020-02-27 RX ORDER — CEFEPIME 1 G/1
2000 INJECTION, POWDER, FOR SOLUTION INTRAMUSCULAR; INTRAVENOUS ONCE
Refills: 0 | Status: COMPLETED | OUTPATIENT
Start: 2020-02-27 | End: 2020-02-27

## 2020-02-27 RX ORDER — CEFEPIME 1 G/1
INJECTION, POWDER, FOR SOLUTION INTRAMUSCULAR; INTRAVENOUS
Refills: 0 | Status: DISCONTINUED | OUTPATIENT
Start: 2020-02-27 | End: 2020-02-28

## 2020-02-27 RX ORDER — LANOLIN ALCOHOL/MO/W.PET/CERES
3 CREAM (GRAM) TOPICAL AT BEDTIME
Refills: 0 | Status: DISCONTINUED | OUTPATIENT
Start: 2020-02-27 | End: 2020-02-28

## 2020-02-27 RX ORDER — ATORVASTATIN CALCIUM 80 MG/1
40 TABLET, FILM COATED ORAL AT BEDTIME
Refills: 0 | Status: DISCONTINUED | OUTPATIENT
Start: 2020-02-27 | End: 2020-02-28

## 2020-02-27 RX ADMIN — ENOXAPARIN SODIUM 40 MILLIGRAM(S): 100 INJECTION SUBCUTANEOUS at 13:29

## 2020-02-27 RX ADMIN — Medication 1 DROP(S): at 18:31

## 2020-02-27 RX ADMIN — Medication 88 MICROGRAM(S): at 05:29

## 2020-02-27 RX ADMIN — Medication 1 DROP(S): at 05:29

## 2020-02-27 RX ADMIN — BUPROPION HYDROCHLORIDE 300 MILLIGRAM(S): 150 TABLET, EXTENDED RELEASE ORAL at 13:29

## 2020-02-27 RX ADMIN — Medication 1 MILLIGRAM(S): at 09:35

## 2020-02-27 RX ADMIN — Medication 1 DROP(S): at 01:24

## 2020-02-27 RX ADMIN — Medication 250 MILLIGRAM(S): at 18:30

## 2020-02-27 RX ADMIN — Medication 250 MILLIGRAM(S): at 05:28

## 2020-02-27 RX ADMIN — Medication 1 DROP(S): at 13:32

## 2020-02-27 RX ADMIN — Medication 1 TABLET(S): at 18:30

## 2020-02-27 RX ADMIN — ARIPIPRAZOLE 2 MILLIGRAM(S): 15 TABLET ORAL at 13:29

## 2020-02-27 RX ADMIN — Medication 3 MILLIGRAM(S): at 22:38

## 2020-02-27 RX ADMIN — Medication 1 MILLIGRAM(S): at 20:24

## 2020-02-27 RX ADMIN — CEFEPIME 100 MILLIGRAM(S): 1 INJECTION, POWDER, FOR SOLUTION INTRAMUSCULAR; INTRAVENOUS at 09:35

## 2020-02-27 RX ADMIN — CEFEPIME 100 MILLIGRAM(S): 1 INJECTION, POWDER, FOR SOLUTION INTRAMUSCULAR; INTRAVENOUS at 16:51

## 2020-02-27 RX ADMIN — Medication 650 MILLIGRAM(S): at 13:28

## 2020-02-27 RX ADMIN — ATORVASTATIN CALCIUM 40 MILLIGRAM(S): 80 TABLET, FILM COATED ORAL at 22:38

## 2020-02-27 RX ADMIN — Medication 650 MILLIGRAM(S): at 06:05

## 2020-02-27 RX ADMIN — Medication 650 MILLIGRAM(S): at 18:30

## 2020-02-27 RX ADMIN — Medication 650 MILLIGRAM(S): at 05:28

## 2020-02-27 NOTE — PROGRESS NOTE ADULT - SUBJECTIVE AND OBJECTIVE BOX
f/u possible cranioplasty infection    Interval History/ROS:  no fever.  OR cultures to date negative.  patient seen twice today.  initially, really no complains.  eye bothers her a little but better c/w yesterday.  no n/v/d.  no abdominal pain.  no dysuria.  no real headache.  2nd time, she was upset stating that the neighbor kept her up all night.  talking on the phone, just being loud.  Remainder of ROS otherwise negative.    PAST MEDICAL & SURGICAL HISTORY:  S/P resection of meningioma: 2003, 2019  HLD (hyperlipidemia)  Thyroid nodule: 1999  OA (osteoarthritis)  Depression with anxiety  S/P  shunt: 2020  S/P resection of meningioma: 2003, 2019  S/P thyroidectomy: 1999 for nodule  S/P lumbar spinal fusion: 2003    Allergies:  penicillin (Hives) when patient was a child, tolerated Keflex in the past    ANTIMICROBIALS:  vancomycin  IVPB 1000 milliGRAM(s) IV Intermittent every 12 hours (1/26-)  cefepime   IVPB 2000 every 8 hours  trimethoprim  160 mG/sulfamethoxazole 800 mG 1 every 12 hours    MEDICATIONS  (STANDING):  acetaminophen   Tablet .. 650 every 6 hours  ARIPiprazole 2 daily  atorvastatin 40 at bedtime  buPROPion XL . 300 daily  enoxaparin Injectable 40 daily  levothyroxine 88 daily    Vital Signs Last 24 Hrs  T(F): 97.8 (02-27-20 @ 15:43), Max: 98 (02-26-20 @ 20:56)  HR: 85 (02-27-20 @ 15:43)  BP: 117/78 (02-27-20 @ 15:43)  RR: 18 (02-27-20 @ 15:43)  SpO2: 94% (02-27-20 @ 15:43) (93% - 100%)    PHYSICAL EXAM:  Constitutional:  non-toxic, no distress  Head: in post-surgical dressing in place; posteriorly old blood stained  Eyes: some conjunctival injection OD  ENT: no rhinorrhea; no oropharyngeal lesions, erythema, or exudates	  Neck: supple  Respiratory: CTA B/L  Cardiovascular: +S1/S2, RRR; no appreciable murmurs  Gastrointestinal: soft, NT/ND; +BS  Musculoskeletal:   no synovitis  Dermatologic: skin warm and dry; no visible rashes or lesions  Neurologic: AAOx3; no focal deficits                                 14.6   7.85  )-----------( 355      ( 25 Feb 2020 19:25 )             46.5 02-25    145  |  106  |  20  ----------------------------<  86  4.7   |  25  |  1.14  Ca    10.0      25 Feb 2020 19:25    MICROBIOLOGY:  Culture - Tissue with Gram Stain (02.26.20 @ 17:22)    Gram Stain:   No polymorphonuclear cells seen per low power field  No organisms seen per oil power field    Specimen Source: .Tissue Other    Culture - CSF with Gram Stain . (11.25.19 @ 17:56)    Gram Stain:   polymorphonuclear leukocytes seen  No organisms seen  by cytocentrifuge    Specimen Source: .CSF CSF    Culture Results:   No growth    Culture - Other (10.26.19 @ 01:36)    Specimen Source: Skin    Culture Results:   **Please Note**: This is a Corrected Report**  Rare Pseudomonas aeruginosa - pan-S  Rare Pseudomonas fluorescens (Carbapenem Resistant) S-only to bactrime    RADIOLOGY & ADDITIONAL STUDIES:  CT Head No Cont (02.26.20 @ 15:33) >  IMPRESSION:  Status post, new right ventriculoperitoneal shunt catheter, ventricles size is unchanged, interval scalp soft tissue swelling and scalp drainage catheter. Unchanged bifrontal craniectomy and mesh cranioplasty with bifrontal encephalomalacia, no new large hemorrhage or shift. If symptoms persist consider follow-up head CT or MR if no contraindications

## 2020-02-27 NOTE — PROGRESS NOTE ADULT - ASSESSMENT
s/p removal of exposed hardware plastics closure with elevated scalp flaps and FTSG   - ENMA (60) per plastics   - ID following on Vancomycin, cefepime and Bactrim DS until OR cultures back. Gram Stain. Pending cultures. Suspect Osteo. WBC count WNL

## 2020-02-27 NOTE — PROGRESS NOTE ADULT - ASSESSMENT
Mrs Fajardo is a 64F history of meningioma resected in 2003 then recurred and resected in 9/24/19.  Course complicated CSF leak 10/25/19 treated with revision (OR cx at that time with PA and pseudomonas fluorescens) s/p bactrim -> cipro for 2 weeks.  Recurrence of leak, RTOR for VPS and repair 11/29/19.  Was doing okay until there was a pin hold dehiscence noted late January 2020.  RTOR 2/26/2020 for removal of exposed mesh cranioplasty, new cranioplasty placement and skin graft to skull.  Post-op so far, she is doing well with negative cultures.  Discussed with plastics attending and neurosurgeon, the skull bone itself does not appear to be infected.      Overall, s/p resection of meningioma d/w leak now s/p VPS and repair of wound dehiscence, removal of exposed cranioplasty and new cranioplasty placement.  Low suspicion of skull osteomyelitis as per surgeon.      Dehisced wound s/p repair  - dressing changes per plastic surgery  - vanc/cefepime, bactrim pending OR cultures  - if cultures remain negative and she remains afebrile with normal WBC, will give only short course cipro/bactrim for 2 weeks at most  - Follow up cultures  - Monitor for fevers  - Trend WBCs     prior +cx  - f/u new cultures  - continue current antibiotic regimen    I have discussed plan of care as detailed above with nessa waggoner and true

## 2020-02-27 NOTE — PROGRESS NOTE ADULT - SUBJECTIVE AND OBJECTIVE BOX
Plastic Surgery Progress Note    Subjective  - Pt seen and examined on AM rounds  - Pt doing well reports good pain control with only Tylenol  -  Up OOB ambulating    Objective  Physical Exam  GEN: NAD, comfortable  HEENT:  Dressing in place with minimal SS ooze posteriorly.  ENMA in place holding suction w/ minimal SS output  CNII-XII intact, moves all extremities  RESP: no increased WOB    Vital Signs  T(C): 36.4 (02-27-20 @ 05:55), Max: 36.7 (02-26-20 @ 20:56)  T(F): 97.6 (02-27-20 @ 05:55), Max: 98 (02-26-20 @ 20:56)  HR: 82 (02-27-20 @ 05:55) (72 - 90)  BP: 120/80 (02-27-20 @ 05:55) (99/67 - 131/59)  RR: 18 (02-27-20 @ 05:55) (16 - 18)  SpO2: 94% (02-27-20 @ 05:55) (93% - 100%)      26 Feb 2020 07:01  -  27 Feb 2020 07:00  --------------------------------------------------------  IN:    lactated ringers.: 900 mL    Oral Fluid: 480 mL  Total IN: 1380 mL    OUT:    Bulb: 60 mL    Voided: 1200 mL  Total OUT: 1260 mL    Total NET: 120 mL

## 2020-02-27 NOTE — PROGRESS NOTE ADULT - SUBJECTIVE AND OBJECTIVE BOX
Patient seen and examined at bedside.    --Anticoagulation--  enoxaparin Injectable 40 milliGRAM(s) SubCutaneous daily    T(C): 36.4 (02-27-20 @ 05:55), Max: 36.8 (02-26-20 @ 06:16)  HR: 82 (02-27-20 @ 05:55) (72 - 95)  BP: 120/80 (02-27-20 @ 05:55) (99/67 - 131/59)  RR: 18 (02-27-20 @ 05:55) (16 - 18)  SpO2: 94% (02-27-20 @ 05:55) (93% - 100%)  Wt(kg): --    Exam:  AOx3, FC, PERRL, EOMI, no facial   5/5 throughout, no drift  SILT  no clonus

## 2020-02-27 NOTE — PROGRESS NOTE ADULT - SUBJECTIVE AND OBJECTIVE BOX
As discussed this morning with Dr. Elliott, the patient has had no evidence of osteomyelitis. There is no non-vascularized bone in the surgical area. The titanium cranioplasty was removed at surgery and after irrigating the field with betadine, peroxide, and bacitracin fluids, a new titanium cranioplasty was placed.     Cultures were taken from the surgical field and are pending.     Most likely the patient will not need prolonged IV antibiotics but this decision will be made after final cultures are back.

## 2020-02-27 NOTE — PROGRESS NOTE ADULT - ASSESSMENT
A/P:  64yoF Hx of meningioma w/ recurrence in 2019 w/ midline craniotomy c/b CSF leak who presented with exposed hardware now s/p scalp flap, replacement of hardware, and FTSG and back graft on 2/ 26.    - Appreciate ID consult.   - f/u OR cultures  - Continue pain medications     Augustus Manrique  Plastic Surgery Resident  [facility]: [phone]  J: [id number]

## 2020-02-28 ENCOUNTER — TRANSCRIPTION ENCOUNTER (OUTPATIENT)
Age: 65
End: 2020-02-28

## 2020-02-28 VITALS
RESPIRATION RATE: 18 BRPM | TEMPERATURE: 99 F | HEART RATE: 84 BPM | DIASTOLIC BLOOD PRESSURE: 81 MMHG | OXYGEN SATURATION: 95 % | SYSTOLIC BLOOD PRESSURE: 131 MMHG

## 2020-02-28 LAB
HCT VFR BLD CALC: 39.8 % — SIGNIFICANT CHANGE UP (ref 34.5–45)
HGB BLD-MCNC: 12 G/DL — SIGNIFICANT CHANGE UP (ref 11.5–15.5)
MCHC RBC-ENTMCNC: 26.4 PG — LOW (ref 27–34)
MCHC RBC-ENTMCNC: 30.2 GM/DL — LOW (ref 32–36)
MCV RBC AUTO: 87.5 FL — SIGNIFICANT CHANGE UP (ref 80–100)
NRBC # BLD: 0 /100 WBCS — SIGNIFICANT CHANGE UP (ref 0–0)
PLATELET # BLD AUTO: 271 K/UL — SIGNIFICANT CHANGE UP (ref 150–400)
RBC # BLD: 4.55 M/UL — SIGNIFICANT CHANGE UP (ref 3.8–5.2)
RBC # FLD: 13.7 % — SIGNIFICANT CHANGE UP (ref 10.3–14.5)
VANCOMYCIN TROUGH SERPL-MCNC: 15.6 UG/ML — SIGNIFICANT CHANGE UP (ref 10–20)
WBC # BLD: 7.47 K/UL — SIGNIFICANT CHANGE UP (ref 3.8–10.5)
WBC # FLD AUTO: 7.47 K/UL — SIGNIFICANT CHANGE UP (ref 3.8–10.5)

## 2020-02-28 PROCEDURE — C1713: CPT

## 2020-02-28 PROCEDURE — 87641 MR-STAPH DNA AMP PROBE: CPT

## 2020-02-28 PROCEDURE — 86850 RBC ANTIBODY SCREEN: CPT

## 2020-02-28 PROCEDURE — 86900 BLOOD TYPING SEROLOGIC ABO: CPT

## 2020-02-28 PROCEDURE — 70450 CT HEAD/BRAIN W/O DYE: CPT

## 2020-02-28 PROCEDURE — 87070 CULTURE OTHR SPECIMN AEROBIC: CPT

## 2020-02-28 PROCEDURE — C1889: CPT

## 2020-02-28 PROCEDURE — 87186 SC STD MICRODIL/AGAR DIL: CPT

## 2020-02-28 PROCEDURE — 82962 GLUCOSE BLOOD TEST: CPT

## 2020-02-28 PROCEDURE — 88304 TISSUE EXAM BY PATHOLOGIST: CPT

## 2020-02-28 PROCEDURE — 86901 BLOOD TYPING SEROLOGIC RH(D): CPT

## 2020-02-28 PROCEDURE — 87640 STAPH A DNA AMP PROBE: CPT

## 2020-02-28 PROCEDURE — G0463: CPT

## 2020-02-28 PROCEDURE — 99232 SBSQ HOSP IP/OBS MODERATE 35: CPT

## 2020-02-28 PROCEDURE — 85027 COMPLETE CBC AUTOMATED: CPT

## 2020-02-28 PROCEDURE — 80202 ASSAY OF VANCOMYCIN: CPT

## 2020-02-28 PROCEDURE — 80048 BASIC METABOLIC PNL TOTAL CA: CPT

## 2020-02-28 PROCEDURE — 87075 CULTR BACTERIA EXCEPT BLOOD: CPT

## 2020-02-28 RX ORDER — CEPHALEXIN 500 MG
1 CAPSULE ORAL
Qty: 20 | Refills: 0
Start: 2020-02-28 | End: 2020-03-03

## 2020-02-28 RX ADMIN — BUPROPION HYDROCHLORIDE 300 MILLIGRAM(S): 150 TABLET, EXTENDED RELEASE ORAL at 12:21

## 2020-02-28 RX ADMIN — Medication 650 MILLIGRAM(S): at 12:21

## 2020-02-28 RX ADMIN — Medication 1 MILLIGRAM(S): at 06:30

## 2020-02-28 RX ADMIN — Medication 1 TABLET(S): at 06:30

## 2020-02-28 RX ADMIN — Medication 250 MILLIGRAM(S): at 06:31

## 2020-02-28 RX ADMIN — Medication 650 MILLIGRAM(S): at 00:45

## 2020-02-28 RX ADMIN — CEFEPIME 100 MILLIGRAM(S): 1 INJECTION, POWDER, FOR SOLUTION INTRAMUSCULAR; INTRAVENOUS at 00:45

## 2020-02-28 RX ADMIN — Medication 88 MICROGRAM(S): at 06:31

## 2020-02-28 RX ADMIN — ARIPIPRAZOLE 2 MILLIGRAM(S): 15 TABLET ORAL at 12:21

## 2020-02-28 RX ADMIN — CEFEPIME 100 MILLIGRAM(S): 1 INJECTION, POWDER, FOR SOLUTION INTRAMUSCULAR; INTRAVENOUS at 08:45

## 2020-02-28 RX ADMIN — Medication 650 MILLIGRAM(S): at 06:31

## 2020-02-28 RX ADMIN — ENOXAPARIN SODIUM 40 MILLIGRAM(S): 100 INJECTION SUBCUTANEOUS at 12:21

## 2020-02-28 NOTE — DISCHARGE NOTE NURSING/CASE MANAGEMENT/SOCIAL WORK - PATIENT PORTAL LINK FT
You can access the FollowMyHealth Patient Portal offered by [facility] by registering at the following website: [email or web address]. By joining [facility]’s FollowMyHealth portal, you will also be able to view your health information using other applications (apps) compatible with our system.

## 2020-02-28 NOTE — PROGRESS NOTE ADULT - SUBJECTIVE AND OBJECTIVE BOX
f/u possible cranioplasty infection    Interval History/ROS:      PAST MEDICAL & SURGICAL HISTORY:  S/P resection of meningioma: 2003, 2019  HLD (hyperlipidemia)  Thyroid nodule: 1999  OA (osteoarthritis)  Depression with anxiety  S/P  shunt: 2020  S/P resection of meningioma: 2003, 2019  S/P thyroidectomy: 1999 for nodule  S/P lumbar spinal fusion: 2003    Allergies:  penicillin (Hives) when patient was a child, tolerated Keflex in the past    ANTIMICROBIALS:  vancomycin  IVPB 1000 milliGRAM(s) IV Intermittent every 12 hours (1/26-)  cefepime   IVPB 2000 every 8 hours  trimethoprim  160 mG/sulfamethoxazole 800 mG 1 every 12 hours    MEDICATIONS  (STANDING):  acetaminophen   Tablet .. 650 every 6 hours  ARIPiprazole 2 daily  atorvastatin 40 at bedtime  buPROPion XL . 300 daily  enoxaparin Injectable 40 daily  levothyroxine 88 daily    Vital Signs Last 24 Hrs  T(F): 97.9 (02-28-20 @ 10:35), Max: 98.5 (02-27-20 @ 22:05)  HR: 82 (02-28-20 @ 10:35)  BP: 138/94 (02-28-20 @ 10:35)  RR: 18 (02-28-20 @ 10:35)  SpO2: 92% (02-28-20 @ 10:35) (92% - 96%)    PHYSICAL EXAM:                            12.0   7.47  )-----------( 271      ( 28 Feb 2020 05:43 )             39.8     MICROBIOLOGY:  Culture - Tissue with Gram Stain (02.26.20 @ 17:22)    Gram Stain:   No polymorphonuclear cells seen per low power field  No organisms seen per oil power field    Specimen Source: .Tissue Other    Culture Results:   Few Coag Negative Staphylococcus "Susceptibilities not performed"    Culture - CSF with Gram Stain . (11.25.19 @ 17:56)    Gram Stain:   polymorphonuclear leukocytes seen  No organisms seen  by cytocentrifuge    Specimen Source: .CSF CSF    Culture Results:   No growth    Culture - Other (10.26.19 @ 01:36)    Specimen Source: Skin    Culture Results:   **Please Note**: This is a Corrected Report**  Rare Pseudomonas aeruginosa - pan-S  Rare Pseudomonas fluorescens (Carbapenem Resistant) S-only to bactrime    RADIOLOGY & ADDITIONAL STUDIES:  CT Head No Cont (02.26.20 @ 15:33) >  IMPRESSION:  Status post, new right ventriculoperitoneal shunt catheter, ventricles size is unchanged, interval scalp soft tissue swelling and scalp drainage catheter. Unchanged bifrontal craniectomy and mesh cranioplasty with bifrontal encephalomalacia, no new large hemorrhage or shift. If symptoms persist consider follow-up head CT or MR if no contraindications f/u possible cranioplasty infection    Interval History/ROS:      PAST MEDICAL & SURGICAL HISTORY:  S/P resection of meningioma: 2003, 2019  HLD (hyperlipidemia)  Thyroid nodule: 1999  OA (osteoarthritis)  Depression with anxiety  S/P  shunt: 2020  S/P resection of meningioma: 2003, 2019  S/P thyroidectomy: 1999 for nodule  S/P lumbar spinal fusion: 2003    Allergies:  penicillin (Hives) when patient was a child, tolerated Keflex in the past    ANTIMICROBIALS:  vancomycin  IVPB 1000 milliGRAM(s) IV Intermittent every 12 hours (1/26-)  cefepime   IVPB 2000 every 8 hours  trimethoprim  160 mG/sulfamethoxazole 800 mG 1 every 12 hours    MEDICATIONS  (STANDING):  acetaminophen   Tablet .. 650 every 6 hours  ARIPiprazole 2 daily  atorvastatin 40 at bedtime  buPROPion XL . 300 daily  enoxaparin Injectable 40 daily  levothyroxine 88 daily    Vital Signs Last 24 Hrs  T(F): 97.9 (02-28-20 @ 10:35), Max: 98.5 (02-27-20 @ 22:05)  HR: 82 (02-28-20 @ 10:35)  BP: 138/94 (02-28-20 @ 10:35)  RR: 18 (02-28-20 @ 10:35)  SpO2: 92% (02-28-20 @ 10:35) (92% - 96%)    PHYSICAL EXAM:  Constitutional:  non-toxic, no distress  Head:  new dressing is c/d/i. ENMA out  Eyes:  resolved conjunctival injection OD  ENT: no rhinorrhea; no oropharyngeal lesions, erythema, or exudates	  Neck: supple  Respiratory: CTA B/L  Cardiovascular: +S1/S2, RRR; no appreciable murmurs  Gastrointestinal: soft, NT/ND; +BS  Musculoskeletal:   no synovitis  Dermatologic: skin warm and dry; no visible rashes or lesions  Neurologic: AAOx3; no focal deficits                             12.0   7.47  )-----------( 271      ( 28 Feb 2020 05:43 )             39.8     MICROBIOLOGY:  Culture - Tissue with Gram Stain (02.26.20 @ 17:22)    Gram Stain:   No polymorphonuclear cells seen per low power field  No organisms seen per oil power field    Specimen Source: .Tissue Other    Culture Results:   Few Coag Negative Staphylococcus "Susceptibilities not performed"    Culture - CSF with Gram Stain . (11.25.19 @ 17:56)    Gram Stain:   polymorphonuclear leukocytes seen  No organisms seen  by cytocentrifuge    Specimen Source: .CSF CSF    Culture Results:   No growth    Culture - Other (10.26.19 @ 01:36)    Specimen Source: Skin    Culture Results:   **Please Note**: This is a Corrected Report**  Rare Pseudomonas aeruginosa - pan-S  Rare Pseudomonas fluorescens (Carbapenem Resistant) S-only to bactrime    RADIOLOGY & ADDITIONAL STUDIES:  CT Head No Cont (02.26.20 @ 15:33) >  IMPRESSION:  Status post, new right ventriculoperitoneal shunt catheter, ventricles size is unchanged, interval scalp soft tissue swelling and scalp drainage catheter. Unchanged bifrontal craniectomy and mesh cranioplasty with bifrontal encephalomalacia, no new large hemorrhage or shift. If symptoms persist consider follow-up head CT or MR if no contraindications f/u possible cranioplasty infection    Interval History/ROS:  no fever.  feels well.  no headache.  slept better since changing her room.  eating well.  Remainder of ROS otherwise negative.    PAST MEDICAL & SURGICAL HISTORY:  S/P resection of meningioma: 2003, 2019  HLD (hyperlipidemia)  Thyroid nodule: 1999  OA (osteoarthritis)  Depression with anxiety  S/P  shunt: 2020  S/P resection of meningioma: 2003, 2019  S/P thyroidectomy: 1999 for nodule  S/P lumbar spinal fusion: 2003    Allergies:  penicillin (Hives) when patient was a child, tolerated Keflex in the past    ANTIMICROBIALS:  vancomycin  IVPB 1000 milliGRAM(s) IV Intermittent every 12 hours (1/26-)  cefepime   IVPB 2000 every 8 hours  trimethoprim  160 mG/sulfamethoxazole 800 mG 1 every 12 hours    MEDICATIONS  (STANDING):  acetaminophen   Tablet .. 650 every 6 hours  ARIPiprazole 2 daily  atorvastatin 40 at bedtime  buPROPion XL . 300 daily  enoxaparin Injectable 40 daily  levothyroxine 88 daily    Vital Signs Last 24 Hrs  T(F): 97.9 (02-28-20 @ 10:35), Max: 98.5 (02-27-20 @ 22:05)  HR: 82 (02-28-20 @ 10:35)  BP: 138/94 (02-28-20 @ 10:35)  RR: 18 (02-28-20 @ 10:35)  SpO2: 92% (02-28-20 @ 10:35) (92% - 96%)    PHYSICAL EXAM:  Constitutional:  non-toxic, no distress  Head:  new dressing is c/d/i. ENMA out  Eyes:  resolved conjunctival injection OD  ENT: no rhinorrhea; no oropharyngeal lesions, erythema, or exudates	  Neck: supple  Respiratory: CTA B/L  Cardiovascular: +S1/S2, RRR; no appreciable murmurs  Gastrointestinal: soft, NT/ND; +BS  Musculoskeletal:   no synovitis  Dermatologic: skin warm and dry; no visible rashes or lesions  Neurologic: AAOx3; no focal deficits                             12.0   7.47  )-----------( 271      ( 28 Feb 2020 05:43 )             39.8     MICROBIOLOGY:  Culture - Tissue with Gram Stain (02.26.20 @ 17:22)    Gram Stain:   No polymorphonuclear cells seen per low power field  No organisms seen per oil power field    Specimen Source: .Tissue Other    Culture Results:   Few Coag Negative Staphylococcus "Susceptibilities not performed"    Culture - CSF with Gram Stain . (11.25.19 @ 17:56)    Gram Stain:   polymorphonuclear leukocytes seen  No organisms seen  by cytocentrifuge    Specimen Source: .CSF CSF    Culture Results:   No growth    Culture - Other (10.26.19 @ 01:36)    Specimen Source: Skin    Culture Results:   **Please Note**: This is a Corrected Report**  Rare Pseudomonas aeruginosa - pan-S  Rare Pseudomonas fluorescens (Carbapenem Resistant) S-only to bactrime    RADIOLOGY & ADDITIONAL STUDIES:  CT Head No Cont (02.26.20 @ 15:33) >  IMPRESSION:  Status post, new right ventriculoperitoneal shunt catheter, ventricles size is unchanged, interval scalp soft tissue swelling and scalp drainage catheter. Unchanged bifrontal craniectomy and mesh cranioplasty with bifrontal encephalomalacia, no new large hemorrhage or shift. If symptoms persist consider follow-up head CT or MR if no contraindications

## 2020-02-28 NOTE — REASON FOR VISIT
[Family Member] : family member [Spouse] : spouse [Post Op: _________] : a [unfilled] post op visit [FreeTextEntry1] : DOS: 10/25/2019 s/p incision of scalp.

## 2020-02-28 NOTE — PROGRESS NOTE ADULT - ASSESSMENT
A/P:  64yoF Hx of meningioma w/ recurrence in 2019 w/ midline craniotomy c/b CSF leak who presented with exposed hardware now s/p scalp flap, replacement of hardware, and FTSG and back graft on 2/ 26.     - Appreciate ID consult.   - f/u OR cultures Grew Coag negative staph  - Continue pain medications     Augustus Manrique  Plastic Surgery Resident  [facility]: [phone]  [facility]: [id number]

## 2020-02-28 NOTE — PROGRESS NOTE ADULT - ASSESSMENT
64F s/p removal of exposed hardware plastics closure with elevated scalp flaps and FTSG   - ENMA (20) per plastics   - Cultures show few coag negative staph, final results pending. Afebrile, WBC count WNL  - Abx per ID

## 2020-02-28 NOTE — DISCHARGE NOTE PROVIDER - NSDCCPTREATMENT_GEN_ALL_CORE_FT
PRINCIPAL PROCEDURE  Procedure: Adjacent tissue transfer involving large defect; (30 sq cm)  Findings and Treatment:

## 2020-02-28 NOTE — DISCHARGE NOTE PROVIDER - CARE PROVIDERS DIRECT ADDRESSES
[email or web address]

## 2020-02-28 NOTE — DISCHARGE NOTE PROVIDER - CARE PROVIDER_API CALL
Jose Banks)  Plastic Surgery  [address], [address]  [place] [address]  Phone: [phone]  Fax: [phone]  Follow Up Time:     Delta Reyes)  Neurological Surgery  [address] [place] [address]  Phone: [phone]  Fax: [phone]  Follow Up Time:

## 2020-02-28 NOTE — PHYSICAL EXAM
[de-identified] : Cranioplasty incision previously closed is reopened about 3-5 mm in size down to the hardware no signs of purulent [NI] : Normal

## 2020-02-28 NOTE — PROGRESS NOTE ADULT - ASSESSMENT
Mrs Fajardo is a 64F history of meningioma resected in 2003 then recurred and resected in 9/24/19.  Course complicated CSF leak 10/25/19 treated with revision (OR cx at that time with PA and pseudomonas fluorescens) s/p bactrim -> cipro for 2 weeks.  Recurrence of leak, RTOR for VPS and repair 11/29/19.  Was doing okay until there was a pin hold dehiscence noted late January 2020.  RTOR 2/26/2020 for removal of exposed mesh cranioplasty, new cranioplasty placement and skin graft to skull.  Post-op so far, she is doing well with negative cultures.  Discussed with plastics attending and neurosurgeon, the skull bone itself does not appear to be infected.      Overall, s/p resection of meningioma d/w leak now s/p VPS and repair of wound dehiscence, removal of exposed cranioplasty and new cranioplasty placement.  Low suspicion of skull osteomyelitis as per surgeon.  Isolated culture with CoNS Mrs Fajardo is a 64F history of meningioma resected in 2003 then recurred and resected in 9/24/19.  Course complicated CSF leak 10/25/19 treated with revision (OR cx at that time with PA and pseudomonas fluorescens) s/p bactrim -> cipro for 2 weeks.  Recurrence of leak, RTOR for VPS and repair 11/29/19.  Was doing okay until there was a pin hold dehiscence noted late January 2020.  RTOR 2/26/2020 for removal of exposed mesh cranioplasty, new cranioplasty placement and skin graft to skull.  Post-op so far, she is doing well.  OR cx with CoNS is culture of fistula tract which has been excised as per d/w Plastic surg att.   And as per d/w plastics attending and neurosurgeon yesterday, the skull bone itself does not appear to be infected.      Overall, s/p resection of meningioma d/w leak now s/p VPS and repair of wound dehiscence, removal of exposed cranioplasty and new cranioplasty placement.  Low suspicion of skull osteomyelitis as per surgeon.  Isolated culture with CoNS    Dehisced wound s/p repair  - dressing changes per plastic surgery  - vancomycin until discharge  - then can transition to keflex 500mg q6 to complete 7 days total  - d/c cefepime  - d/c bactrim    - Monitor for fevers  - Trend WBCs     prior +cx  - f/u all cultures  - antibiotics per above noted    I have discussed plan of care as detailed above with dr. waggoner Mrs Fajardo is a 64F history of meningioma resected in 2003 then recurred and resected in 9/24/19.  Course complicated CSF leak 10/25/19 treated with revision (OR cx at that time with PA and pseudomonas fluorescens) s/p bactrim -> cipro for 2 weeks.  Recurrence of leak, RTOR for VPS and repair 11/29/19.  Was doing okay until there was a pin hold dehiscence noted late January 2020.  RTOR 2/26/2020 for removal of exposed mesh cranioplasty, new cranioplasty placement and skin graft to skull.  Post-op so far, she is doing well.  OR cx with CoNS is culture of fistula tract which has been excised as per d/w Plastic surg att.   And as per d/w plastics attending and neurosurgeon yesterday, the skull bone itself does not appear to be infected.      Overall, s/p resection of meningioma d/w leak now s/p VPS and repair of wound dehiscence, removal of exposed cranioplasty and new cranioplasty placement.  Low suspicion of skull osteomyelitis as per surgeon.  Isolated culture with CoNS    Dehisced wound s/p repair  - dressing changes per plastic surgery  - vancomycin until discharge  - then can transition to keflex 500mg q6 to complete 7 days total  - d/c cefepime  - d/c bactrim    - Monitor for fevers  - Trend WBCs     prior +cx  - f/u all cultures  - antibiotics per above noted    I have discussed plan of care as detailed above with dr. waggoner    Please call the ID service [phone] with questions or concerns over the weekend

## 2020-02-28 NOTE — DISCHARGE NOTE PROVIDER - NSDCMRMEDTOKEN_GEN_ALL_CORE_FT
Abilify 2 mg oral tablet: 1 tab(s) orally once a day  doxepin 50 mg oral capsule: 1 cap(s) orally once a day (at bedtime)  KlonoPIN 1 mg oral tablet: 1 tab(s) orally 2 times a day  levothyroxine 88 mcg (0.088 mg) oral tablet: 1 tab(s) orally once a day  Lipitor 40 mg oral tablet: 1 tab(s) orally once a day  Wellbutrin  mg/24 hours oral tablet, extended release: 1 tab(s) orally every 24 hours Abilify 2 mg oral tablet: 1 tab(s) orally once a day  doxepin 50 mg oral capsule: 1 cap(s) orally once a day (at bedtime)  Keflex 500 mg oral capsule: 1 cap(s) orally every 6 hours   KlonoPIN 1 mg oral tablet: 1 tab(s) orally 2 times a day  levothyroxine 88 mcg (0.088 mg) oral tablet: 1 tab(s) orally once a day  Lipitor 40 mg oral tablet: 1 tab(s) orally once a day  Wellbutrin  mg/24 hours oral tablet, extended release: 1 tab(s) orally every 24 hours

## 2020-02-28 NOTE — DISCHARGE NOTE PROVIDER - HOSPITAL COURSE
64 year old woman with PMH of anxiety and depression, brain hemangioma s/p resection in 2003 and 9/2019 for recurrence c/b CSF leak s/p VPS placement in 11/2019. Now pt states she has a non-healing wound to her previous crani incision. She denies experiencing drainage since she had the VPS placed. She has been afebrile. Presented 2/25/2020 for removal of hardware, scalp flap with skin graft. Underwent stated procedure on 2/26/2020. One drain was placed. Skin graft donor site was right groin. The patient tolerated the procedure well. The patient's drain was removed POD2. Cultures grew coag negative staph. ID recommended a course of __. The patient was discharged with follow up with Dr. Banks in 1 week for skin graft bolster removal.  At the time of discharge, the patient was hemodynamically stable, was tolerating PO diet, was voiding urine, was ambulating, and was comfortable with adequate pain control. 64 year old woman with PMH of anxiety and depression, brain hemangioma s/p resection in 2003 and 9/2019 for recurrence c/b CSF leak s/p VPS placement in 11/2019. Now pt states she has a non-healing wound to her previous crani incision. She denies experiencing drainage since she had the VPS placed. She has been afebrile. Presented 2/25/2020 for removal of hardware, scalp flap with skin graft. Underwent stated procedure on 2/26/2020 with removal of sinus tract. One drain was placed. Skin graft donor site was right groin. The patient tolerated the procedure well. The patient's drain was removed POD2. Cultures grew coag negative staph. ID recommended a 7 day course of Keflex. The patient was discharged with follow up with Dr. Banks in 1 week for skin graft bolster removal.  At the time of discharge, the patient was hemodynamically stable, was tolerating PO diet, was voiding urine, was ambulating, and was comfortable with adequate pain control.

## 2020-02-28 NOTE — DISCHARGE NOTE PROVIDER - NSDCCPCAREPLAN_GEN_ALL_CORE_FT
PRINCIPAL DISCHARGE DIAGNOSIS  Diagnosis: Exposed orthopaedic hardware  Assessment and Plan of Treatment:

## 2020-02-28 NOTE — DISCHARGE NOTE PROVIDER - NSDCFUADDINST_GEN_ALL_CORE_FT
Wound care instructions: dress scalp incision daily with xeroform. Cover bolster and incision with ABDs, hold in place with Emily and paper tape.    You have a bolster on the back of your scalp. Do not remove it. Dr. Banks will remove it in the office. Do not get it wet.    Your groin incision is covered with a surgical tape called Steri Strips. The Steri Strips will remain in place until they fall off on their own or are removed in the office.    You may shower from your chest down.    Please keep your head elevated while in bed.    Take medications as prescribed.    You may take over the counter acetaminophen (Tylenol) or ibuprofen (Motrin) as needed for pain.    No exercising, strenuous activity, or heavy lifting.    Please follow up with Dr. Banks 1 week after discharge from the hospital. You may call [phone] to schedule an appointment.

## 2020-02-28 NOTE — DISCHARGE NOTE PROVIDER - NSDCFUSCHEDAPPT_GEN_ALL_CORE_FT
MEGHA CUMMINS ; 03/03/2020 ; [facility] PlastSurg [address] [facility]  MEGHA CUMMINS ; 04/08/2020 ; [facility]  Op[facility]kv  MEGHA CUMMINS ; 04/08/2020 ; [facility] Neurosurg [address] [facility] MEGHA CUMMINS ; 03/03/2020 ; [facility] PlastSurg [facility]  MEGHA CUMMINS ; 04/08/2020 ; [facility] Rad  Opd Lkv  MEGHA CUMMINS ; 04/08/2020 ; [facility] Neurosurg [address] [facility] MEGHA CUMMINS ; 03/03/2020 ; [facility] PlastSurg [address] [facility]  MEGHA CUMMINS ; 04/08/2020 ; [facility] Rad  Opd Lkv  MEGHA CUMMINS ; 04/08/2020 ; [facility] Neurosurg [address] [facility]

## 2020-02-28 NOTE — PROGRESS NOTE ADULT - SUBJECTIVE AND OBJECTIVE BOX
Plastic Surgery Progress Note    Subjective  - Pt seen and examined on AM rounds  - Pt says she is doing great, with good pain control.  - OOB ambulating    Objective  Physical Exam  GEN: NAD, comfortable  HEENT:  Dressing replaced on AM rounds.  Incisions c/d/i.  ENMA drain removed on rounds.  Dressed incisions w/ xeroform over incisions, abd pads and then wrapped with kerlix  CNII-XII intact, moves all extremities  RESP: no increased WOB    Vital Signs  T(C): 36.5 (02-28-20 @ 05:35), Max: 36.9 (02-27-20 @ 22:05)  T(F): 97.7 (02-28-20 @ 05:35), Max: 98.5 (02-27-20 @ 22:05)  HR: 79 (02-28-20 @ 05:35) (77 - 93)  BP: 140/90 (02-28-20 @ 05:35) (117/78 - 151/86)  RR: 18 (02-28-20 @ 05:35) (18 - 18)  SpO2: 95% (02-28-20 @ 05:35) (93% - 96%)      27 Feb 2020 07:01  -  28 Feb 2020 07:00  --------------------------------------------------------  IN:    Oral Fluid: 1180 mL  Total IN: 1180 mL    OUT:    Bulb: 20 mL  Total OUT: 20 mL    Total NET: 1160 mL

## 2020-02-28 NOTE — PROGRESS NOTE ADULT - SUBJECTIVE AND OBJECTIVE BOX
Patient seen and examined at bedside.    --Anticoagulation--  enoxaparin Injectable 40 milliGRAM(s) SubCutaneous daily    T(C): 36.5 (02-28-20 @ 05:35), Max: 36.9 (02-27-20 @ 22:05)  HR: 79 (02-28-20 @ 05:35) (77 - 93)  BP: 140/90 (02-28-20 @ 05:35) (117/78 - 151/86)  RR: 18 (02-28-20 @ 05:35) (18 - 18)  SpO2: 95% (02-28-20 @ 05:35) (93% - 96%)  Wt(kg): --    Exam:   AOx3, FC, PERRL, EOMI, no facial   5/5 throughout, no drift  SILT  no clonus

## 2020-02-29 LAB
-  AMPICILLIN/SULBACTAM: SIGNIFICANT CHANGE UP
-  CEFAZOLIN: SIGNIFICANT CHANGE UP
-  CLINDAMYCIN: SIGNIFICANT CHANGE UP
-  ERYTHROMYCIN: SIGNIFICANT CHANGE UP
-  GENTAMICIN: SIGNIFICANT CHANGE UP
-  OXACILLIN: SIGNIFICANT CHANGE UP
-  PENICILLIN: SIGNIFICANT CHANGE UP
-  RIFAMPIN: SIGNIFICANT CHANGE UP
-  TETRACYCLINE: SIGNIFICANT CHANGE UP
-  TRIMETHOPRIM/SULFAMETHOXAZOLE: SIGNIFICANT CHANGE UP
-  VANCOMYCIN: SIGNIFICANT CHANGE UP
METHOD TYPE: SIGNIFICANT CHANGE UP

## 2020-03-02 LAB
CULTURE RESULTS: SIGNIFICANT CHANGE UP
ORGANISM # SPEC MICROSCOPIC CNT: SIGNIFICANT CHANGE UP
ORGANISM # SPEC MICROSCOPIC CNT: SIGNIFICANT CHANGE UP
SPECIMEN SOURCE: SIGNIFICANT CHANGE UP

## 2020-03-03 ENCOUNTER — APPOINTMENT (OUTPATIENT)
Dept: PLASTIC SURGERY | Facility: CLINIC | Age: 65
End: 2020-03-03
Payer: MEDICARE

## 2020-03-03 VITALS
SYSTOLIC BLOOD PRESSURE: 122 MMHG | HEART RATE: 93 BPM | HEIGHT: 64 IN | TEMPERATURE: 97.6 F | DIASTOLIC BLOOD PRESSURE: 84 MMHG | OXYGEN SATURATION: 95 %

## 2020-03-03 PROCEDURE — 99024 POSTOP FOLLOW-UP VISIT: CPT

## 2020-03-03 NOTE — REASON FOR VISIT
[Follow-Up: _____] : a [unfilled] follow-up visit [FreeTextEntry1] : DOS: 2/26/20 s/p wound dehiscence. Pt is doing well, denies any other concerns.

## 2020-03-03 NOTE — PHYSICAL EXAM
[NI] : Normal [de-identified] : incision c/d/i healing well no erythema no sign of infection bloster removed skin graft 100% taken

## 2020-03-03 NOTE — HISTORY OF PRESENT ILLNESS
[FreeTextEntry1] : 64-year-old female presented originally with exposed hardware underwent exchange of the cranioplasty with scalp flap and full-thickness skin graft. Patient presents today for postop visit. The patient is doing well

## 2020-03-10 ENCOUNTER — APPOINTMENT (OUTPATIENT)
Dept: PLASTIC SURGERY | Facility: CLINIC | Age: 65
End: 2020-03-10
Payer: MEDICARE

## 2020-03-10 PROCEDURE — 99024 POSTOP FOLLOW-UP VISIT: CPT

## 2020-03-10 NOTE — PHYSICAL EXAM
[NI] : Normal [de-identified] : incision c/d/i healing well no erythema no sign of infection skin graft 100% taken

## 2020-03-10 NOTE — REASON FOR VISIT
[Spouse] : spouse [Follow-Up: _____] : a [unfilled] follow-up visit [FreeTextEntry1] : DOS: 2/26/20 s/p wound dehiscence. Pt is doing well, denies any other concerns.

## 2020-03-25 NOTE — PHYSICAL EXAM
[NI] : Normal [de-identified] : incision c/d healing well no erythema no sign of infection incision closed

## 2020-03-31 ENCOUNTER — APPOINTMENT (OUTPATIENT)
Dept: PLASTIC SURGERY | Facility: CLINIC | Age: 65
End: 2020-03-31
Payer: MEDICARE

## 2020-03-31 VITALS — TEMPERATURE: 97.6 F | SYSTOLIC BLOOD PRESSURE: 136 MMHG | HEART RATE: 97 BPM | DIASTOLIC BLOOD PRESSURE: 88 MMHG

## 2020-03-31 PROCEDURE — 99024 POSTOP FOLLOW-UP VISIT: CPT

## 2020-03-31 NOTE — PHYSICAL EXAM
[NI] : Normal [de-identified] : incision c/d/i healing well no erythema no sign of infection skin graft 100% taken stitches removed

## 2020-03-31 NOTE — REASON FOR VISIT
[Follow-Up: _____] : a [unfilled] follow-up visit [Spouse] : spouse [FreeTextEntry1] : DOS: 2/26/20 s/p wound dehiscence. Pt is doing well, denies any other concerns.

## 2020-04-07 NOTE — REASON FOR VISIT
Spoke with patient.  Offered and accepted phone appt with Dr. Amato on 4/8 at 10:30.   [Post Op: _________] : a [unfilled] post op visit [FreeTextEntry1] : DOS: 10/25/2019 s/p incision of scalp.

## 2020-05-11 NOTE — CHART NOTE - NSCHARTNOTESELECT_GEN_ALL_CORE
Event Note
Event Note/POST OP CHECK
vte risk assessment/Event Note
BPH (benign prostatic hyperplasia)

## 2020-06-09 ENCOUNTER — APPOINTMENT (OUTPATIENT)
Dept: PLASTIC SURGERY | Facility: CLINIC | Age: 65
End: 2020-06-09
Payer: MEDICARE

## 2020-06-09 VITALS
DIASTOLIC BLOOD PRESSURE: 87 MMHG | WEIGHT: 170 LBS | SYSTOLIC BLOOD PRESSURE: 121 MMHG | HEART RATE: 96 BPM | TEMPERATURE: 97.8 F | BODY MASS INDEX: 29.02 KG/M2 | HEIGHT: 64 IN

## 2020-06-09 PROCEDURE — 99212 OFFICE O/P EST SF 10 MIN: CPT

## 2020-06-09 NOTE — PHYSICAL EXAM
[NI] : Normal [de-identified] : incision c/d/i healing well no erythema no sign of infection skin graft 100% taken stitches removed

## 2020-06-10 ENCOUNTER — RESULT REVIEW (OUTPATIENT)
Age: 65
End: 2020-06-10

## 2020-06-10 ENCOUNTER — APPOINTMENT (OUTPATIENT)
Dept: NEUROSURGERY | Facility: CLINIC | Age: 65
End: 2020-06-10
Payer: MEDICARE

## 2020-06-10 ENCOUNTER — OUTPATIENT (OUTPATIENT)
Dept: OUTPATIENT SERVICES | Facility: HOSPITAL | Age: 65
LOS: 1 days | End: 2020-06-10
Payer: MEDICARE

## 2020-06-10 ENCOUNTER — APPOINTMENT (OUTPATIENT)
Dept: MRI IMAGING | Facility: IMAGING CENTER | Age: 65
End: 2020-06-10
Payer: MEDICARE

## 2020-06-10 DIAGNOSIS — Z98.1 ARTHRODESIS STATUS: Chronic | ICD-10-CM

## 2020-06-10 DIAGNOSIS — Z98.2 PRESENCE OF CEREBROSPINAL FLUID DRAINAGE DEVICE: Chronic | ICD-10-CM

## 2020-06-10 DIAGNOSIS — Z98.890 OTHER SPECIFIED POSTPROCEDURAL STATES: Chronic | ICD-10-CM

## 2020-06-10 DIAGNOSIS — Z86.018 PERSONAL HISTORY OF OTHER BENIGN NEOPLASM: ICD-10-CM

## 2020-06-10 PROCEDURE — 99213 OFFICE O/P EST LOW 20 MIN: CPT

## 2020-06-10 PROCEDURE — 70552 MRI BRAIN STEM W/DYE: CPT | Mod: 26

## 2020-06-10 PROCEDURE — 70552 MRI BRAIN STEM W/DYE: CPT

## 2020-06-10 PROCEDURE — A9585: CPT

## 2020-06-10 NOTE — PHYSICAL EXAM
[General Appearance - Alert] : alert [General Appearance - In No Acute Distress] : in no acute distress [Oriented To Time, Place, And Person] : oriented to person, place, and time [Impaired Insight] : insight and judgment were intact [Affect] : the affect was normal [Person] : oriented to person [Time] : oriented to time [Place] : oriented to place [Span Intact] : the attention span was normal [Short Term Intact] : short term memory intact [Remote Intact] : remote memory intact [Fluency] : fluency intact [Concentration Intact] : normal concentrating ability [Comprehension] : comprehension intact [Current Events] : adequate knowledge of current events [Past History] : adequate knowledge of personal past history [Cranial Nerves Optic (II)] : visual acuity intact bilaterally,  pupils equal round and reactive to light [Vocabulary] : adequate range of vocabulary [Cranial Nerves Oculomotor (III)] : extraocular motion intact [Cranial Nerves Trigeminal (V)] : facial sensation intact symmetrically [Cranial Nerves Facial (VII)] : face symmetrical [Cranial Nerves Glossopharyngeal (IX)] : tongue and palate midline [Cranial Nerves Vestibulocochlear (VIII)] : hearing was intact bilaterally [Cranial Nerves Hypoglossal (XII)] : there was no tongue deviation with protrusion [Motor Tone] : muscle tone was normal in all four extremities [Cranial Nerves Accessory (XI - Cranial And Spinal)] : head turning and shoulder shrug symmetric [No Muscle Atrophy] : normal bulk in all four extremities [Motor Strength] : muscle strength was normal in all four extremities [Sensation Tactile Decrease] : light touch was intact [Balance] : balance was intact [Abnormal Walk] : normal gait [Tremor] : no tremor present [Past-pointing] : there was no past-pointing [2+] : Triceps left 2+

## 2020-06-10 NOTE — ASSESSMENT
[FreeTextEntry1] : - no recurrent tumor; VPS without signs of over/underdrainage; next f/u in 6 month with MRI w contrast.

## 2020-06-10 NOTE — REASON FOR VISIT
[Follow-Up: _____] : a [unfilled] follow-up visit [FreeTextEntry1] : Patient with complex surgical history: left central parasagittal meningioma resected 2003, no adjuvant therapy; re-resection for recurrence 09/2019 (Nava Grade I with excision SSS), postop CSF fistula, local wound revision 11/19, persistent CSF fistula; VPS right parietal and ultimately in 02/20 rotational scalp flap.\par Patient comes today for f/u; She feels magnificent, no HA, vertigo, dizziness, weakness; scalp incisions well healed. VPS Certas confirmed at 2\par MRI brain 06/10/2020 shows no tumor recurrence.\par

## 2020-06-18 DIAGNOSIS — Z01.818 ENCOUNTER FOR OTHER PREPROCEDURAL EXAMINATION: ICD-10-CM

## 2020-06-18 RX ORDER — ATORVASTATIN CALCIUM 80 MG/1
1 TABLET, FILM COATED ORAL
Qty: 0 | Refills: 0 | DISCHARGE

## 2020-06-18 RX ORDER — SODIUM CHLORIDE 9 MG/ML
3 INJECTION INTRAMUSCULAR; INTRAVENOUS; SUBCUTANEOUS EVERY 8 HOURS
Refills: 0 | Status: DISCONTINUED | OUTPATIENT
Start: 2020-06-22 | End: 2020-07-07

## 2020-06-18 RX ORDER — LIDOCAINE HCL 20 MG/ML
0.2 VIAL (ML) INJECTION ONCE
Refills: 0 | Status: DISCONTINUED | OUTPATIENT
Start: 2020-06-22 | End: 2020-07-07

## 2020-06-19 ENCOUNTER — APPOINTMENT (OUTPATIENT)
Dept: DISASTER EMERGENCY | Facility: CLINIC | Age: 65
End: 2020-06-19

## 2020-06-20 LAB — SARS-COV-2 N GENE NPH QL NAA+PROBE: NOT DETECTED

## 2020-06-21 ENCOUNTER — TRANSCRIPTION ENCOUNTER (OUTPATIENT)
Age: 65
End: 2020-06-21

## 2020-06-22 ENCOUNTER — APPOINTMENT (OUTPATIENT)
Dept: PLASTIC SURGERY | Facility: HOSPITAL | Age: 65
End: 2020-06-22
Payer: MEDICARE

## 2020-06-22 ENCOUNTER — OUTPATIENT (OUTPATIENT)
Dept: OUTPATIENT SERVICES | Facility: HOSPITAL | Age: 65
LOS: 1 days | End: 2020-06-22
Payer: MEDICARE

## 2020-06-22 VITALS
HEART RATE: 76 BPM | OXYGEN SATURATION: 100 % | DIASTOLIC BLOOD PRESSURE: 70 MMHG | RESPIRATION RATE: 16 BRPM | SYSTOLIC BLOOD PRESSURE: 120 MMHG

## 2020-06-22 VITALS
DIASTOLIC BLOOD PRESSURE: 83 MMHG | OXYGEN SATURATION: 97 % | HEIGHT: 64 IN | HEART RATE: 92 BPM | WEIGHT: 185.19 LBS | TEMPERATURE: 97 F | RESPIRATION RATE: 17 BRPM | SYSTOLIC BLOOD PRESSURE: 130 MMHG

## 2020-06-22 DIAGNOSIS — S01.00XD UNSPECIFIED OPEN WOUND OF SCALP, SUBSEQUENT ENCOUNTER: ICD-10-CM

## 2020-06-22 DIAGNOSIS — Z98.2 PRESENCE OF CEREBROSPINAL FLUID DRAINAGE DEVICE: Chronic | ICD-10-CM

## 2020-06-22 DIAGNOSIS — Z98.890 OTHER SPECIFIED POSTPROCEDURAL STATES: Chronic | ICD-10-CM

## 2020-06-22 DIAGNOSIS — Z98.1 ARTHRODESIS STATUS: Chronic | ICD-10-CM

## 2020-06-22 LAB
ANION GAP SERPL CALC-SCNC: 12 MMOL/L — SIGNIFICANT CHANGE UP (ref 5–17)
BUN SERPL-MCNC: 20 MG/DL — SIGNIFICANT CHANGE UP (ref 7–23)
CALCIUM SERPL-MCNC: 9.4 MG/DL — SIGNIFICANT CHANGE UP (ref 8.4–10.5)
CHLORIDE SERPL-SCNC: 106 MMOL/L — SIGNIFICANT CHANGE UP (ref 96–108)
CO2 SERPL-SCNC: 24 MMOL/L — SIGNIFICANT CHANGE UP (ref 22–31)
CREAT SERPL-MCNC: 1.22 MG/DL — SIGNIFICANT CHANGE UP (ref 0.5–1.3)
GLUCOSE SERPL-MCNC: 106 MG/DL — HIGH (ref 70–99)
HCT VFR BLD CALC: 42.9 % — SIGNIFICANT CHANGE UP (ref 34.5–45)
HGB BLD-MCNC: 13.7 G/DL — SIGNIFICANT CHANGE UP (ref 11.5–15.5)
MCHC RBC-ENTMCNC: 27.6 PG — SIGNIFICANT CHANGE UP (ref 27–34)
MCHC RBC-ENTMCNC: 31.9 GM/DL — LOW (ref 32–36)
MCV RBC AUTO: 86.5 FL — SIGNIFICANT CHANGE UP (ref 80–100)
NRBC # BLD: 0 /100 WBCS — SIGNIFICANT CHANGE UP (ref 0–0)
PLATELET # BLD AUTO: 259 K/UL — SIGNIFICANT CHANGE UP (ref 150–400)
POTASSIUM SERPL-MCNC: 3.7 MMOL/L — SIGNIFICANT CHANGE UP (ref 3.5–5.3)
POTASSIUM SERPL-SCNC: 3.7 MMOL/L — SIGNIFICANT CHANGE UP (ref 3.5–5.3)
RBC # BLD: 4.96 M/UL — SIGNIFICANT CHANGE UP (ref 3.8–5.2)
RBC # FLD: 12.9 % — SIGNIFICANT CHANGE UP (ref 10.3–14.5)
SODIUM SERPL-SCNC: 142 MMOL/L — SIGNIFICANT CHANGE UP (ref 135–145)
WBC # BLD: 5.65 K/UL — SIGNIFICANT CHANGE UP (ref 3.8–10.5)
WBC # FLD AUTO: 5.65 K/UL — SIGNIFICANT CHANGE UP (ref 3.8–10.5)

## 2020-06-22 PROCEDURE — 80048 BASIC METABOLIC PNL TOTAL CA: CPT

## 2020-06-22 PROCEDURE — 11426 EXC H-F-NK-SP B9+MARG >4 CM: CPT

## 2020-06-22 PROCEDURE — 85027 COMPLETE CBC AUTOMATED: CPT

## 2020-06-22 RX ORDER — OXYCODONE HYDROCHLORIDE 5 MG/1
5 TABLET ORAL ONCE
Refills: 0 | Status: DISCONTINUED | OUTPATIENT
Start: 2020-06-22 | End: 2020-06-22

## 2020-06-22 RX ORDER — OXYCODONE HYDROCHLORIDE 5 MG/1
10 TABLET ORAL ONCE
Refills: 0 | Status: DISCONTINUED | OUTPATIENT
Start: 2020-06-22 | End: 2020-06-22

## 2020-06-22 RX ORDER — ONDANSETRON 8 MG/1
4 TABLET, FILM COATED ORAL ONCE
Refills: 0 | Status: DISCONTINUED | OUTPATIENT
Start: 2020-06-22 | End: 2020-07-07

## 2020-06-22 RX ORDER — MUPIROCIN 20 MG/G
1 OINTMENT TOPICAL
Qty: 14 | Refills: 0
Start: 2020-06-22 | End: 2020-06-28

## 2020-06-22 RX ORDER — ARIPIPRAZOLE 15 MG/1
1 TABLET ORAL
Qty: 0 | Refills: 0 | DISCHARGE

## 2020-06-22 RX ORDER — DOXEPIN HCL 100 MG
1 CAPSULE ORAL
Qty: 0 | Refills: 0 | DISCHARGE

## 2020-06-22 RX ORDER — APREPITANT 80 MG/1
40 CAPSULE ORAL ONCE
Refills: 0 | Status: COMPLETED | OUTPATIENT
Start: 2020-06-22 | End: 2020-06-22

## 2020-06-22 RX ORDER — OXYCODONE HYDROCHLORIDE 5 MG/1
1 TABLET ORAL
Qty: 12 | Refills: 0
Start: 2020-06-22 | End: 2020-06-26

## 2020-06-22 RX ORDER — FENTANYL CITRATE 50 UG/ML
25 INJECTION INTRAVENOUS
Refills: 0 | Status: DISCONTINUED | OUTPATIENT
Start: 2020-06-22 | End: 2020-06-22

## 2020-06-22 RX ORDER — BUPROPION HYDROCHLORIDE 150 MG/1
1 TABLET, EXTENDED RELEASE ORAL
Qty: 0 | Refills: 0 | DISCHARGE

## 2020-06-22 RX ORDER — CLONAZEPAM 1 MG
1 TABLET ORAL
Qty: 0 | Refills: 0 | DISCHARGE

## 2020-06-22 RX ORDER — SODIUM CHLORIDE 9 MG/ML
1000 INJECTION, SOLUTION INTRAVENOUS
Refills: 0 | Status: DISCONTINUED | OUTPATIENT
Start: 2020-06-22 | End: 2020-07-07

## 2020-06-22 RX ADMIN — APREPITANT 40 MILLIGRAM(S): 80 CAPSULE ORAL at 09:32

## 2020-06-22 NOTE — ASU PATIENT PROFILE, ADULT - PMH
Depression with anxiety    HLD (hyperlipidemia)    Meningioma  2003, 2019  OA (osteoarthritis)    Thyroid nodule  1999

## 2020-06-22 NOTE — ASU DISCHARGE PLAN (ADULT/PEDIATRIC) - CALL YOUR DOCTOR IF YOU HAVE ANY OF THE FOLLOWING:
Swelling that gets worse/Bleeding that does not stop/Wound/Surgical Site with redness, or foul smelling discharge or pus/Numbness, tingling, color or temperature change to extremity/Pain not relieved by Medications

## 2020-06-22 NOTE — ASU DISCHARGE PLAN (ADULT/PEDIATRIC) - PAIN MANAGEMENT
Prescriptions electronically submitted to pharmacy from [facility] Prescriptions electronically submitted to pharmacy from [facility]/next dose of tylenol @ 4;30 pm

## 2020-06-22 NOTE — ASU DISCHARGE PLAN (ADULT/PEDIATRIC) - CARE PROVIDER_API CALL
Jose Banks  PLASTIC SURGERY  [address] [place] [address]  Phone: [phone]  Fax: [phone]  Follow Up Time: 1 week

## 2020-06-22 NOTE — ASU PATIENT PROFILE, ADULT - PSH
S/P lumbar spinal fusion  2003  S/P resection of meningioma  2003, 2019  S/P thyroidectomy  1999 for nodule  S/P  shunt  1/2020

## 2020-06-22 NOTE — PRE-ANESTHESIA EVALUATION ADULT - NSANTHSNORERD_ENT_A_CORE
Visit Summary:    Today we discussed:   1. No medication changes, until you hear back from our office.  2. I will reach out to the kidney team and see if they feel any changes to your water pills are necessary.  3. I will investigate who we should have you follow up with about your pleurex catheter.  4. Try your best to stay under 2000mg per day for sodium. It is very difficult, try to read labels closely.   5. Try to wear your compression socks during the day, and you can take them off at night when your legs are elevated.     Test results:   Results for CEDRICK PHILLIPS (MRN [id number]) as of 6/16/2020 14:05   Ref. Range 6/16/2020 13:13   Sodium Latest Ref Range: 136 - 145 mmol/L 136   Potassium Latest Ref Range: 3.5 - 5.1 mmol/L 4.0   Chloride Latest Ref Range: 98 - 107 mmol/L 100   Carbon Dioxide Latest Ref Range: 23 - 29 mmol/L 30 (H)   Urea Nitrogen Latest Ref Range: 7 - 30 mg/dL 18   Creatinine Latest Ref Range: 0.70 - 1.30 mg/dL 1.89 (H)   GFR Estimate Latest Ref Range: >60 mL/min/1.73_m2 34 (L)   GFR Estimate If Black Latest Ref Range: >60 mL/min/1.73_m2 42 (L)   Calcium Latest Ref Range: 8.5 - 10.5 mg/dL 9.1   Anion Gap Latest Ref Range: 6 - 17 mmol/L 10       Follow up:   With Katlin in ~2 weeks in the clinic.     Please call my nurse Corin at [phone] with any questions or concerns.         
No

## 2020-06-23 PROBLEM — D32.9 BENIGN NEOPLASM OF MENINGES, UNSPECIFIED: Chronic | Status: ACTIVE | Noted: 2020-06-18

## 2020-07-07 ENCOUNTER — APPOINTMENT (OUTPATIENT)
Dept: PLASTIC SURGERY | Facility: CLINIC | Age: 65
End: 2020-07-07
Payer: MEDICARE

## 2020-07-07 DIAGNOSIS — S01.00XD UNSPECIFIED OPEN WOUND OF SCALP, SUBSEQUENT ENCOUNTER: ICD-10-CM

## 2020-07-07 PROCEDURE — 99212 OFFICE O/P EST SF 10 MIN: CPT

## 2020-07-09 PROBLEM — S01.00XD OPEN WOUND OF SCALP, UNSPECIFIED OPEN WOUND TYPE, SUBSEQUENT ENCOUNTER: Status: ACTIVE | Noted: 2019-11-07

## 2020-07-09 NOTE — HISTORY OF PRESENT ILLNESS
[FreeTextEntry1] : 65-year-old female status post excision of a skin graft from the posterior scalp with scalp flap closure. Patient is doing well no fever no chills no drainage pain well-controlled

## 2020-07-09 NOTE — REASON FOR VISIT
[Post Op: _________] : a [unfilled] post op visit [FreeTextEntry1] : DOS: 06/22/2020 s/p excision of scalp skin graft. Pt states she is doing better with time, denies any complaints.

## 2020-08-31 NOTE — ED ADULT TRIAGE NOTE - RESPIRATORY RATE (BREATHS/MIN)
18 [FreeTextEntry3] : All medical record entries made by the Scribe were at my, Dr. Monica Vigil, direction and personally dictated by me on 08/31/2020. I have reviewed the chart and agree that the record accurately reflects my personal performance of the history, physical exam, assessment and plan. I have also personally directed, reviewed, and agreed with the chart.

## 2020-12-09 ENCOUNTER — APPOINTMENT (OUTPATIENT)
Dept: MRI IMAGING | Facility: IMAGING CENTER | Age: 65
End: 2020-12-09
Payer: MEDICARE

## 2020-12-09 ENCOUNTER — OUTPATIENT (OUTPATIENT)
Dept: OUTPATIENT SERVICES | Facility: HOSPITAL | Age: 65
LOS: 1 days | End: 2020-12-09
Payer: MEDICARE

## 2020-12-09 ENCOUNTER — APPOINTMENT (OUTPATIENT)
Dept: NEUROSURGERY | Facility: CLINIC | Age: 65
End: 2020-12-09
Payer: MEDICARE

## 2020-12-09 ENCOUNTER — RESULT REVIEW (OUTPATIENT)
Age: 65
End: 2020-12-09

## 2020-12-09 DIAGNOSIS — Z98.1 ARTHRODESIS STATUS: Chronic | ICD-10-CM

## 2020-12-09 DIAGNOSIS — Z98.890 OTHER SPECIFIED POSTPROCEDURAL STATES: Chronic | ICD-10-CM

## 2020-12-09 DIAGNOSIS — Z98.2 PRESENCE OF CEREBROSPINAL FLUID DRAINAGE DEVICE: Chronic | ICD-10-CM

## 2020-12-09 DIAGNOSIS — Z86.018 PERSONAL HISTORY OF OTHER BENIGN NEOPLASM: ICD-10-CM

## 2020-12-09 DIAGNOSIS — Z98.890 OTHER SPECIFIED POSTPROCEDURAL STATES: ICD-10-CM

## 2020-12-09 PROCEDURE — 70553 MRI BRAIN STEM W/O & W/DYE: CPT

## 2020-12-09 PROCEDURE — 62252 CSF SHUNT REPROGRAM: CPT

## 2020-12-09 PROCEDURE — 70553 MRI BRAIN STEM W/O & W/DYE: CPT | Mod: 26

## 2020-12-09 PROCEDURE — 99214 OFFICE O/P EST MOD 30 MIN: CPT

## 2020-12-09 PROCEDURE — A9585: CPT

## 2020-12-10 VITALS
SYSTOLIC BLOOD PRESSURE: 100 MMHG | DIASTOLIC BLOOD PRESSURE: 68 MMHG | OXYGEN SATURATION: 98 % | RESPIRATION RATE: 16 BRPM | TEMPERATURE: 98.1 F | HEART RATE: 78 BPM

## 2020-12-10 PROBLEM — Z98.890 S/P CRANIOTOMY: Status: ACTIVE | Noted: 2019-10-09

## 2020-12-10 NOTE — REASON FOR VISIT
[Follow-Up: _____] : a [unfilled] follow-up visit [FreeTextEntry1] : Patient is doing excellent. Surgical site is well healed. Patient's mood and affect are good.\par Certas VPS @2. No reported headaches, dizziness or gait/balance issues since last visit.\par Patient continues to have remote visits every 8-10 weeks with her psychiatrist. No recent changes in her medications.\par

## 2020-12-10 NOTE — PHYSICAL EXAM
[General Appearance - Alert] : alert [General Appearance - In No Acute Distress] : in no acute distress [Well-Healed] : well-healed [Normal Skin] : normal [Tender] : not tender [Normal Skin Turgor] : skin turgor was normal [FreeTextEntry1] : cranial incisions [Oriented To Time, Place, And Person] : oriented to person, place, and time [Impaired Insight] : insight and judgment were intact [Affect] : the affect was normal [Person] : oriented to person [Place] : oriented to place [Time] : oriented to time [Short Term Intact] : short term memory intact [Remote Intact] : remote memory intact [Span Intact] : the attention span was normal [Concentration Intact] : normal concentrating ability [Fluency] : fluency intact [Comprehension] : comprehension intact [Current Events] : adequate knowledge of current events [Past History] : adequate knowledge of personal past history [Vocabulary] : adequate range of vocabulary [Cranial Nerves Optic (II)] : visual acuity intact bilaterally,  pupils equal round and reactive to light [Cranial Nerves Oculomotor (III)] : extraocular motion intact [Cranial Nerves Trigeminal (V)] : facial sensation intact symmetrically [Cranial Nerves Facial (VII)] : face symmetrical [Cranial Nerves Vestibulocochlear (VIII)] : hearing was intact bilaterally [Cranial Nerves Glossopharyngeal (IX)] : tongue and palate midline [Cranial Nerves Accessory (XI - Cranial And Spinal)] : head turning and shoulder shrug symmetric [Cranial Nerves Hypoglossal (XII)] : there was no tongue deviation with protrusion [Motor Strength] : muscle strength was normal in all four extremities [No Muscle Atrophy] : normal bulk in all four extremities [Sensation Tactile Decrease] : light touch was intact [Balance] : balance was intact [Past-pointing] : there was no past-pointing [Tremor] : no tremor present [2+] : Patella left 2+ [Sclera] : the sclera and conjunctiva were normal [PERRL With Normal Accommodation] : pupils were equal in size, round, reactive to light, with normal accommodation [Extraocular Movements] : extraocular movements were intact [Outer Ear] : the ears and nose were normal in appearance [Oropharynx] : the oropharynx was normal [Abnormal Walk] : normal gait [Nail Clubbing] : no clubbing  or cyanosis of the fingernails [Musculoskeletal - Swelling] : no joint swelling seen [Motor Tone] : muscle strength and tone were normal [Skin Color & Pigmentation] : normal skin color and pigmentation [Skin Turgor] : normal skin turgor [] : no rash

## 2020-12-10 NOTE — DATA REVIEWED
[de-identified] : EXAM: MR BRAIN WAW IC\par \par \par PROCEDURE DATE: 12/09/2020\par \par \par \par INTERPRETATION: Contrast-enhanced MRI of the brain.\par \par CLINICAL INDICATION: Follow-up for meningioma\par \par TECHNIQUE: Multiplanar, multisequence MR images of the brain were obtained with images acquired prior to and following the intravenous administration of 8 cc of gadovist. Perfusion imaging was post processed with rapid perfusion software.\par \par COMPARISON: Brain MRI dated 11/25/2019 and 6/10/2020\par \par FINDINGS: Redemonstrated is high bifrontal midline craniectomy and mesh cranioplasty. There is no evidence for nodular enhancement to indicate recurrent tumor. Bifrontal encephalomalacia and gliosis is similar in appearance.\par \par A right parietal approach ventriculostomy catheter is in place. The right ventricle remains slitlike.\par \par No acute infarct, hemorrhage, hydrocephalus or midline shift is identified.\par \par Perfusion imaging demonstrates a relative decrease in cerebral blood volume and cerebral blood flow in the bifrontal region with a relative increase in mean transit time and Tmax consistent with postsurgical change.\par \par Major flow voids at the skull base are within normal limits.\par \par The orbits are not remarkable in appearance.\par \par The visualized paranasal sinuses and tympanomastoid cavities are free of acute disease.\par \par Impression:\par \par No evidence for recurrent/residual neoplasm.\par \par \par \par \par \par \par \par ALTA SANCHEZ MD; Attending Radiologist\par This document has been electronically signed. Dec 9 2020 3:56PM

## 2020-12-10 NOTE — ASSESSMENT
[FreeTextEntry1] : Discussion:\par - Reviewed MRI results. No recurrent growth noted. Good surgical healing.\par - Patient asymptomatic.\par - Certas VPS@2. No changes to setting indicated at this time.\par - Follow-up in 6 months with MRI +/- contrast and OV.

## 2021-03-19 NOTE — PRE-ANESTHESIA EVALUATION ADULT - NSANTHPMHFT_GEN_ALL_CORE
Pt sent upstairs with transport in stable condition. Pt in NAD. VSS.    64 year old woman with PMH of anxiety and depression, brain hemangioma s/p resection in 2003 and 9/2019 for recurrence c/b CSF leak s/p  shunt placement in 11/2019.  non-healing wound at the previous crani incision. 64 year old woman with PMH of anxiety and depression, brain meningioma s/p resection in 2003 and 9/2019 for recurrence c/b CSF leak s/p  shunt placement in 11/2019.  non-healing wound at the previous crani incision. right kneww meniscus tear 2016; tubal ligation

## 2021-06-09 ENCOUNTER — OUTPATIENT (OUTPATIENT)
Dept: OUTPATIENT SERVICES | Facility: HOSPITAL | Age: 66
LOS: 1 days | End: 2021-06-09
Payer: MEDICARE

## 2021-06-09 ENCOUNTER — APPOINTMENT (OUTPATIENT)
Dept: MRI IMAGING | Facility: IMAGING CENTER | Age: 66
End: 2021-06-09
Payer: MEDICARE

## 2021-06-09 ENCOUNTER — RESULT REVIEW (OUTPATIENT)
Age: 66
End: 2021-06-09

## 2021-06-09 ENCOUNTER — APPOINTMENT (OUTPATIENT)
Dept: NEUROSURGERY | Facility: CLINIC | Age: 66
End: 2021-06-09
Payer: MEDICARE

## 2021-06-09 DIAGNOSIS — Z98.1 ARTHRODESIS STATUS: Chronic | ICD-10-CM

## 2021-06-09 DIAGNOSIS — D32.0 BENIGN NEOPLASM OF CEREBRAL MENINGES: ICD-10-CM

## 2021-06-09 DIAGNOSIS — Z98.890 OTHER SPECIFIED POSTPROCEDURAL STATES: Chronic | ICD-10-CM

## 2021-06-09 DIAGNOSIS — Z98.2 PRESENCE OF CEREBROSPINAL FLUID DRAINAGE DEVICE: Chronic | ICD-10-CM

## 2021-06-09 PROCEDURE — 99212 OFFICE O/P EST SF 10 MIN: CPT

## 2021-06-09 PROCEDURE — 70553 MRI BRAIN STEM W/O & W/DYE: CPT | Mod: 26,MH

## 2021-06-09 PROCEDURE — 70553 MRI BRAIN STEM W/O & W/DYE: CPT

## 2021-06-09 PROCEDURE — A9585: CPT

## 2021-06-09 NOTE — ASSESSMENT
[FreeTextEntry1] : L parasagittal meningioma s/p Craniotomy and complete resection of the tumor \par s/p R  shunt \par \par Reviewed MRI result today. No recurrent growth noted. \par Wound well healed. \par VPS Certas valve @2 checked.\par \par Plan: \par List for Tumor Board \par Follow up with MRI wwo contrast in 1 year

## 2021-06-09 NOTE — REASON FOR VISIT
[Follow-Up: _____] : a [unfilled] follow-up visit [FreeTextEntry1] : Patient with complex surgical history:\par left central parasagittal meningioma resected 2003, no adjuvant therapy;\par re-resection for recurrence 09/2019 (Nava Grade I with excision SSS), \par postop CSF fistula, local wound revision 11/19, persistent CSF fistula; \par VPS right parietal and ultimately in 02/20 rotational scalp flap.\par \par Patient comes today for f/u; She feels magnificent, no HA, vertigo, dizziness, weakness; \par scalp incisions well healed. \par VPS Certas confirmed at 2\par MRI brain today (06/09/2021) shows no evidence of tumor recurrence.\par

## 2021-06-09 NOTE — END OF VISIT
[] : Fellow [Fellow] : Fellow [Time Spent: ___ minutes] : I have spent [unfilled] minutes of time on the encounter. [>50% of the face to face encounter time was spent on counseling and/or coordination of care for ___] : Greater than 50% of the face to face encounter time was spent on counseling and/or coordination of care for [unfilled] [FreeTextEntry2] : VPS valve pressure checked

## 2021-06-09 NOTE — PHYSICAL EXAM
[General Appearance - Alert] : alert [General Appearance - In No Acute Distress] : in no acute distress [General Appearance - Well Nourished] : well nourished [Clean] : clean [Well-Healed] : well-healed [Impaired Insight] : insight and judgment were intact [Oriented To Time, Place, And Person] : oriented to person, place, and time [Affect] : the affect was normal [Motor Tone] : muscle tone was normal in all four extremities [Motor Strength] : muscle strength was normal in all four extremities [Abnormal Walk] : normal gait [Balance] : balance was intact [Intact] : all reflexes within normal limits bilaterally

## 2022-02-15 NOTE — PROGRESS NOTE ADULT - ASSESSMENT
Macey Fajardo  64F presents s/p meningioma resection X2 last in september by Dr. Reyes presented to the ED today with clear drainage from wound with area of dehiscence in the midline now s/p wound revision with plastics closure (10/25/19)  - Floor when more than 4h post op  - Cont muporicin BID to incision  - Bactrim DS BID  - f/u OR Cx, ID consult  - Cont home psych meds, hx of suicidal ideation on last admission  - Pain control  - Incentive spirometry / early ambulation  - Screening LED / PT eval no

## 2022-03-02 NOTE — H&P ADULT - NSHPROSALLOTHERNEGRD_GEN_ALL_CORE
All other review of systems negative, except as noted in HPI
Detail Level: Detailed
General Sunscreen Counseling: I recommended a broad spectrum sunscreen with a SPF of 30 or higher.  I explained that SPF 30 sunscreens block approximately 97 percent of the sun's harmful rays.  Sunscreens should be applied at least 15 minutes prior to expected sun exposure and then every 2 hours after that as long as sun exposure continues. If swimming or exercising sunscreen should be reapplied every 45 minutes to an hour after getting wet or sweating.  One ounce, or the equivalent of a shot glass full of sunscreen, is adequate to protect the skin not covered by a bathing suit. I also recommended a lip balm with a sunscreen as well. Sun protective clothing can be used in lieu of sunscreen but must be worn the entire time you are exposed to the sun's rays.

## 2022-04-07 NOTE — ED ADULT TRIAGE NOTE - LOCATION:
FUTURE VISIT INFORMATION      FUTURE VISIT INFORMATION:    Date: 5/12/22    Time: 2PM    Location: [facility]  REFERRAL INFORMATION:    Referring provider:      Referring providers clinic:      Reason for visit/diagnosis  ENT consult for asymmetric hearing loss referred by Dr. Aman Loving    RECORDS REQUESTED FROM:       Clinic name Comments Records Status Imaging Status   [facility] [place] 2/24/22 audio and note from Lila Loving EPIC                                                             Right arm;

## 2022-05-03 ENCOUNTER — APPOINTMENT (OUTPATIENT)
Dept: ORTHOPEDIC SURGERY | Facility: CLINIC | Age: 67
End: 2022-05-03
Payer: MEDICARE

## 2022-05-03 VITALS — HEIGHT: 63 IN | WEIGHT: 150 LBS | BODY MASS INDEX: 26.58 KG/M2

## 2022-05-03 PROCEDURE — J3490M: CUSTOM

## 2022-05-03 PROCEDURE — 99214 OFFICE O/P EST MOD 30 MIN: CPT | Mod: 25

## 2022-05-03 PROCEDURE — 20610 DRAIN/INJ JOINT/BURSA W/O US: CPT

## 2022-05-03 NOTE — IMAGING
[de-identified] : Constitutional: The patient appears well developed, well nourished. Examination of patients ability to communicate functionally was normal. \par \par Skin: Skin of the head and face is normal without rashes, lesions or ulcers. \par \par Cardiovascular: Peripheral Vascular System is normal. \par \par Neurologic: Alert and oriented to time, place and person. No evidence of mood disorder, calm affect. \par \par Back, including spine: Range of motion of the thoracic and lumbar spine is as follows: Diminished range of motion in all planes. \par \par Right Knee: Inspection of the knee is as follows: varus deformity. Palpation of the knee is as follows: crepitus about the patella. Knee Range of Motion is as follows: Extension: 0 degrees. Flexion: 130 degrees. Ligament Stability and Special Test equivocal McMurrays test. Gait and function is as follows: mildly antalgic gait. \par \par Left Knee: Palpation of the knee is as follows: medial joint line tenderness, lateral joint line tenderness, medial femoral condyle tenderness, medial tibial plateau tenderness, quadriceps tendon tenderness and crepitus about the patella. Knee Range of Motion is as follows: Extension: 0 degrees. Flexion: 125 degrees. Ligament Stability and Special Test equivocal McMurrays test. Gait and function is as follows: mildly antalgic gait.

## 2022-05-03 NOTE — HISTORY OF PRESENT ILLNESS
[7] : 7 [0] : 0 [Walking] : walking [Lying in bed] : lying in bed [de-identified] : pt is here for follow up of bilateral knees. right knee pain acting up . would like to set up visco for ragini [FreeTextEntry1] : bilateral knees  [de-identified] : mobility in bed  [de-identified] : none

## 2022-05-03 NOTE — PROCEDURE
[de-identified] : Procedure Name: Large Joint Injection / Aspiration: Depomedrol and Marcaine\par Anesthesia: ethyl chloride sprayed topically.. \par Depomedrol: An injection of Depomedrol 80 mg , 1 cc. \par Marcaine: 5 cc. \par Medication was injected in the right knee. Patient has tried OTC's including aspirin, Ibuprofen, Aleve etc or prescription\par NSAIDS, and/or exercises at home and/ or physical therapy without satisfactory response. After verbal consent using sterile preparation and technique. The risks, benefits, and alternatives to cortisone injection were explained in full to the patient. Risks outlined include but are not limited to infection, sepsis, bleeding, scarring, skin discoloration, temporary  increase in pain, syncopal episode, failure to resolve symptoms, allergic reaction, symptom recurrence, and elevation of blood sugar in diabetics. Patient understood the risks. All questions were answered. After discussion of options, patient requested an injection. Oral informed consent was obtained and sterile prep was done of the injection\par site. Sterile technique was utilized for the procedure including the preparation of the solutions used for the injection. Patient tolerated the procedure well. Advised to ice the injection site this evening. Prep with alcohol locally to site. Sterile technique used. Post Procedure Instructions: Patient was advised to call if redness, pain, or fever occur and\par apply ice for 15 min. out of every hour for the next 12-24 hours as tolerated.

## 2022-05-03 NOTE — ASSESSMENT
[FreeTextEntry1] :  66 year F WITH MODERATE B/L KNEE PAIN. PAIN WORSENS WITH STAIRS AND WALKING PROLONGED DISTANCES. PAIN IS AFFECTING ADL AND FUNCTIONAL ACTIVITIES. TREATMENT OPTIONS REVIEWED. \par \par cortiosne right knee today. rtn for visco in june bilateral knees

## 2022-05-05 NOTE — ASU PREOP CHECKLIST - AS TEMP SITE
temporal 61M pmh htn, dm c/o right hip pain 2/2 unilateral primary osteoarthritis here for PsT for scheduled Right total hip replacement  CAPRINI SCORE    AGE RELATED RISK FACTORS                                                       MOBILITY RELATED FACTORS  [ ] Age 41-60 years                                            (1 Point)                  [ ] Bed rest                                                        (1 Point)  [x ] Age: 61-74 years                                           (2 Points)                [ ] Plaster cast                                                   (2 Points)  [ ] Age= 75 years                                              (3 Points)                 [ ] Bed bound for more than 72 hours                   (2 Points)    DISEASE RELATED RISK FACTORS                                               GENDER SPECIFIC FACTORS  [ x] Edema in the lower extremities                       (1 Point)                  [ ] Pregnancy                                                     (1 Point)  [ ] Varicose veins                                               (1 Point)                  [ ] Post-partum < 6 weeks                                   (1 Point)             [ x] BMI > 25 Kg/m2                                            (1 Point)                  [ ] Hormonal therapy  or oral contraception            (1 Point)                 [ ] Sepsis (in the previous month)                        (1 Point)                  [ ] History of pregnancy complications  [ ] Pneumonia or serious lung disease                                               [ ] Unexplained or recurrent                       (1 Point)           (in the previous month)                               (1 Point)  [ ] Abnormal pulmonary function test                     (1 Point)                 SURGERY RELATED RISK FACTORS  [ ] Acute myocardial infarction                              (1 Point)                 [ ]  Section                                            (1 Point)  [ ] Congestive heart failure (in the previous month)  (1 Point)                 [ ] Minor surgery                                                 (1 Point)   [ ] Inflammatory bowel disease                             (1 Point)                 [ ] Arthroscopic surgery                                        (2 Points)  [ ] Central venous access                                    (2 Points)                [ ] General surgery lasting more than 45 minutes   (2 Points)       [ ] Stroke (in the previous month)                          (5 Points)               [ x] Elective arthroplasty                                        (5 Points)                                                                                                                                               HEMATOLOGY RELATED FACTORS                                                 TRAUMA RELATED RISK FACTORS  [ ] Prior episodes of VTE                                     (3 Points)                 [ ] Fracture of the hip, pelvis, or leg                       (5 Points)  [ ] Positive family history for VTE                         (3 Points)                 [ ] Acute spinal cord injury (in the previous month)  (5 Points)  [ ] Prothrombin 70718 A                                      (3 Points)                 [ ] Paralysis  (less than 1 month)                          (5 Points)  [ ] Factor V Leiden                                             (3 Points)                 [ ] Multiple Trauma within 1 month                         (5 Points)  [ ] Lupus anticoagulants                                     (3 Points)                                                           [ ] Anticardiolipin antibodies                                (3 Points)                                                       [ ] High homocysteine in the blood                      (3 Points)                                             [ ] Other congenital or acquired thrombophilia       (3 Points)                                                [ ] Heparin induced thrombocytopenia                  (3 Points)                                          Total Score [      9    ]

## 2022-06-07 ENCOUNTER — APPOINTMENT (OUTPATIENT)
Dept: ORTHOPEDIC SURGERY | Facility: CLINIC | Age: 67
End: 2022-06-07
Payer: MEDICARE

## 2022-06-07 VITALS — WEIGHT: 150 LBS | BODY MASS INDEX: 26.58 KG/M2 | HEIGHT: 63 IN

## 2022-06-07 PROCEDURE — 99213 OFFICE O/P EST LOW 20 MIN: CPT | Mod: 25

## 2022-06-07 PROCEDURE — 20610 DRAIN/INJ JOINT/BURSA W/O US: CPT | Mod: 50

## 2022-06-07 NOTE — PROCEDURE
[de-identified] : Procedure Name: Euflexxa (Large Joint)\par \par Viscosupplementation Injection: X-ray evidence of Osteoarthritis on this or prior visit, Patient has tried OTC's including aspirin, Ibuprofen, Aleve etc or prescription NSAIDS, and/or exercises at home and/ or physical therapy without satisfactory response and Repeat series performed because patient had significant improvement in their pain and functional capacity from prior series which was given more than six months ago. \par \par An injection of Euflexxa 2ml #1 was injected into the bilateral knee(s). after verbal consent using sterile technique. The risks, benefits, and alternatives to Viscosupplementation injection were explained in full to the patient. Risks outlined include but are not limited to infection, sepsis, bleeding, scarring, skin discoloration, temporary increase in pain, syncopal episode, failure to resolve symptoms, allergic reaction, and symptom recurrence. Signs and symptoms of infection reviewed and patient advised to call immediately for redness, fevers, and/or chills. Patient understood the risks. All questions were answered. After discussion of options, patient requested Viscosupplementation. The patient tolerated the procedure well. Ice tonight to the injection site. \par

## 2022-06-07 NOTE — IMAGING
[de-identified] : Constitutional: The patient appears well developed, well nourished. Examination of patients ability to communicate functionally was normal. \par \par Skin: Skin of the head and face is normal without rashes, lesions or ulcers. \par \par Cardiovascular: Peripheral Vascular System is normal. \par \par Neurologic: Alert and oriented to time, place and person. No evidence of mood disorder, calm affect. \par \par Back, including spine: Range of motion of the thoracic and lumbar spine is as follows: Diminished range of motion in all planes. \par \par Right Knee: Inspection of the knee is as follows: varus deformity. Palpation of the knee is as follows: crepitus about the patella. Knee Range of Motion is as follows: Extension: 0 degrees. Flexion: 130 degrees. Ligament Stability and Special Test equivocal McMurrays test. Gait and function is as follows: mildly antalgic gait. \par \par Left Knee: Palpation of the knee is as follows: medial joint line tenderness, lateral joint line tenderness, medial femoral condyle tenderness, medial tibial plateau tenderness, quadriceps tendon tenderness and crepitus about the patella. Knee Range of Motion is as follows: Extension: 0 degrees. Flexion: 125 degrees. Ligament Stability and Special Test equivocal McMurrays test. Gait and function is as follows: mildly antalgic gait.

## 2022-06-07 NOTE — ASSESSMENT
[FreeTextEntry1] :  66 year F WITH MODERATE B/L KNEE PAIN. PAIN WORSENS WITH STAIRS AND WALKING PROLONGED DISTANCES. PAIN IS AFFECTING ADL AND FUNCTIONAL ACTIVITIES. TREATMENT OPTIONS REVIEWED. \par \par EUFLEXXA #1 TODAY B/L KNEES.\par \par

## 2022-06-08 ENCOUNTER — APPOINTMENT (OUTPATIENT)
Dept: MRI IMAGING | Facility: IMAGING CENTER | Age: 67
End: 2022-06-08
Payer: MEDICARE

## 2022-06-08 ENCOUNTER — APPOINTMENT (OUTPATIENT)
Dept: NEUROSURGERY | Facility: CLINIC | Age: 67
End: 2022-06-08
Payer: MEDICARE

## 2022-06-08 ENCOUNTER — OUTPATIENT (OUTPATIENT)
Dept: OUTPATIENT SERVICES | Facility: HOSPITAL | Age: 67
LOS: 1 days | End: 2022-06-08
Payer: MEDICARE

## 2022-06-08 DIAGNOSIS — Z98.2 PRESENCE OF CEREBROSPINAL FLUID DRAINAGE DEVICE: Chronic | ICD-10-CM

## 2022-06-08 DIAGNOSIS — Z98.890 OTHER SPECIFIED POSTPROCEDURAL STATES: Chronic | ICD-10-CM

## 2022-06-08 DIAGNOSIS — Z98.1 ARTHRODESIS STATUS: Chronic | ICD-10-CM

## 2022-06-08 DIAGNOSIS — D32.9 BENIGN NEOPLASM OF MENINGES, UNSPECIFIED: ICD-10-CM

## 2022-06-08 DIAGNOSIS — D32.0 BENIGN NEOPLASM OF CEREBRAL MENINGES: ICD-10-CM

## 2022-06-08 PROCEDURE — 70553 MRI BRAIN STEM W/O & W/DYE: CPT | Mod: 26,MH

## 2022-06-08 PROCEDURE — 70553 MRI BRAIN STEM W/O & W/DYE: CPT

## 2022-06-08 PROCEDURE — A9585: CPT

## 2022-06-08 PROCEDURE — 99213 OFFICE O/P EST LOW 20 MIN: CPT

## 2022-06-09 PROBLEM — D32.0 BENIGN MENINGIOMA OF BRAIN: Status: ACTIVE | Noted: 2020-12-09

## 2022-06-09 NOTE — REASON FOR VISIT
[Follow-Up: _____] : a [unfilled] follow-up visit [FreeTextEntry1] : Ms. Fajadro is here today for a follow up and to review MRI of Brain.  67 yo female who originally underwent bifrontal meningioma resection in 2003, required second surgery for recurrence 9/2019. Her post-op course was complicated by wound healing issues / CSF leak that ultimately required a plastics tissue advancement and VPS. Her meningioma was not graded by Dr. Crump as he could not obtain her original slides but a Ki67 of 3-7% was noted. \par \par Happily, pt has no complaints. No HA, N/V, weakness/numbness, issues w/ executive function. She has sold her houses and is soon to move into a snf community in [place], [place].

## 2022-06-09 NOTE — HISTORY OF PRESENT ILLNESS
[FreeTextEntry1] : Patient with complex surgical history:\par left central parasagittal meningioma resected 2003, no adjuvant therapy;\par re-resection for recurrence 09/2019 (Nava Grade I with excision SSS), \par postop CSF fistula, local wound revision 11/19, persistent CSF fistula; \par VPS right parietal and ultimately in 02/20 rotational scalp flap.\par \par

## 2022-06-09 NOTE — ASSESSMENT
[FreeTextEntry1] : 66 year old with L parasagittal meningioma s/p Nava 1 resection. No new issues. Radiographically stable w/o recurrence. Advise continues surveillance. Given high Ki67, will continue to use contrast.\par \par She wishes to continue f/u here in [place] despite her impending move to [place]. Local neurosurgical contacts in [facility] and at [facility] were provided nonetheless.

## 2022-06-09 NOTE — PHYSICAL EXAM
[FreeTextEntry1] : Scalp incisions well healed\par Ox3, speech C/F/A\par PERRL 4->3 & EOMI OU\par FS/TM\par ROBLES 5/5 (UE D/B/T/HG) & LE (HF/KE/KF/DF/PF)\par

## 2022-06-09 NOTE — DATA REVIEWED
[de-identified] : +/- brain. Compared to most recent prior, no new local recurrence suspected. Note that official radiology read pending.

## 2022-06-13 ENCOUNTER — APPOINTMENT (OUTPATIENT)
Dept: NEUROSURGERY | Facility: CLINIC | Age: 67
End: 2022-06-13

## 2022-06-14 ENCOUNTER — APPOINTMENT (OUTPATIENT)
Dept: ORTHOPEDIC SURGERY | Facility: CLINIC | Age: 67
End: 2022-06-14
Payer: MEDICARE

## 2022-06-14 VITALS — WEIGHT: 150 LBS | HEIGHT: 63 IN | BODY MASS INDEX: 26.58 KG/M2

## 2022-06-14 PROCEDURE — 20610 DRAIN/INJ JOINT/BURSA W/O US: CPT | Mod: 50

## 2022-06-14 PROCEDURE — J3490M: CUSTOM

## 2022-06-14 NOTE — PROCEDURE
[de-identified] : Procedure Name: Euflexxa (Large Joint)\par \par Viscosupplementation Injection: X-ray evidence of Osteoarthritis on this or prior visit, Patient has tried OTC's including aspirin, Ibuprofen, Aleve etc or prescription NSAIDS, and/or exercises at home and/ or physical therapy without satisfactory response and Repeat series performed because patient had significant improvement in their pain and functional capacity from prior series which was given more than six months ago. \par \par An injection of Euflexxa 2ml #2 was injected into the bilateral knee(s). after verbal consent using sterile technique. The risks, benefits, and alternatives to Viscosupplementation injection were explained in full to the patient. Risks outlined include but are not limited to infection, sepsis, bleeding, scarring, skin discoloration, temporary increase in pain, syncopal episode, failure to resolve symptoms, allergic reaction, and symptom recurrence. Signs and symptoms of infection reviewed and patient advised to call immediately for redness, fevers, and/or chills. Patient understood the risks. All questions were answered. After discussion of options, patient requested Viscosupplementation. The patient tolerated the procedure well. Ice tonight to the injection site. \par

## 2022-06-14 NOTE — IMAGING
[de-identified] : Constitutional: The patient appears well developed, well nourished. Examination of patients ability to communicate functionally was normal. \par \par Skin: Skin of the head and face is normal without rashes, lesions or ulcers. \par \par Cardiovascular: Peripheral Vascular System is normal. \par \par Neurologic: Alert and oriented to time, place and person. No evidence of mood disorder, calm affect. \par \par Back, including spine: Range of motion of the thoracic and lumbar spine is as follows: Diminished range of motion in all planes. \par \par Right Knee: Inspection of the knee is as follows: varus deformity. Palpation of the knee is as follows: crepitus about the patella. Knee Range of Motion is as follows: Extension: 0 degrees. Flexion: 130 degrees. Ligament Stability and Special Test equivocal McMurrays test. Gait and function is as follows: mildly antalgic gait. \par \par Left Knee: Palpation of the knee is as follows: medial joint line tenderness, lateral joint line tenderness, medial femoral condyle tenderness, medial tibial plateau tenderness, quadriceps tendon tenderness and crepitus about the patella. Knee Range of Motion is as follows: Extension: 0 degrees. Flexion: 125 degrees. Ligament Stability and Special Test equivocal McMurrays test. Gait and function is as follows: mildly antalgic gait.

## 2022-06-21 ENCOUNTER — APPOINTMENT (OUTPATIENT)
Dept: ORTHOPEDIC SURGERY | Facility: CLINIC | Age: 67
End: 2022-06-21
Payer: MEDICARE

## 2022-06-21 DIAGNOSIS — M17.12 UNILATERAL PRIMARY OSTEOARTHRITIS, LEFT KNEE: ICD-10-CM

## 2022-06-21 DIAGNOSIS — M17.11 UNILATERAL PRIMARY OSTEOARTHRITIS, RIGHT KNEE: ICD-10-CM

## 2022-06-21 PROCEDURE — 20610 DRAIN/INJ JOINT/BURSA W/O US: CPT | Mod: 50

## 2022-06-21 NOTE — ASSESSMENT
[FreeTextEntry1] : 66 year F WITH MODERATE B/L KNEE PAIN. PAIN WORSENS WITH STAIRS AND WALKING PROLONGED DISTANCES. PAIN IS AFFECTING ADL AND FUNCTIONAL ACTIVITIES. TREATMENT OPTIONS REVIEWED. \par \par EUFLEXXA #3 TODAY B/L KNEES.\par \par

## 2022-06-21 NOTE — PROCEDURE
[de-identified] : Procedure Name: Euflexxa (Large Joint)\par \par Viscosupplementation Injection: X-ray evidence of Osteoarthritis on this or prior visit, Patient has tried OTC's including aspirin, Ibuprofen, Aleve etc or prescription NSAIDS, and/or exercises at home and/ or physical therapy without satisfactory response and Repeat series performed because patient had significant improvement in their pain and functional capacity from prior series which was given more than six months ago. \par \par An injection of Euflexxa 2ml #3 was injected into the bilateral knee(s). after verbal consent using sterile technique. The risks, benefits, and alternatives to Viscosupplementation injection were explained in full to the patient. Risks outlined include but are not limited to infection, sepsis, bleeding, scarring, skin discoloration, temporary increase in pain, syncopal episode, failure to resolve symptoms, allergic reaction, and symptom recurrence. Signs and symptoms of infection reviewed and patient advised to call immediately for redness, fevers, and/or chills. Patient understood the risks. All questions were answered. After discussion of options, patient requested Viscosupplementation. The patient tolerated the procedure well. Ice tonight to the injection site. \par

## 2022-06-21 NOTE — IMAGING
[de-identified] : Constitutional: The patient appears well developed, well nourished. Examination of patients ability to communicate functionally was normal. \par \par Skin: Skin of the head and face is normal without rashes, lesions or ulcers. \par \par Cardiovascular: Peripheral Vascular System is normal. \par \par Neurologic: Alert and oriented to time, place and person. No evidence of mood disorder, calm affect. \par \par Back, including spine: Range of motion of the thoracic and lumbar spine is as follows: Diminished range of motion in all planes. \par \par Right Knee: Inspection of the knee is as follows: varus deformity. Palpation of the knee is as follows: crepitus about the patella. Knee Range of Motion is as follows: Extension: 0 degrees. Flexion: 130 degrees. Ligament Stability and Special Test equivocal McMurrays test. Gait and function is as follows: mildly antalgic gait. \par \par Left Knee: Palpation of the knee is as follows: medial joint line tenderness, lateral joint line tenderness, medial femoral condyle tenderness, medial tibial plateau tenderness, quadriceps tendon tenderness and crepitus about the patella. Knee Range of Motion is as follows: Extension: 0 degrees. Flexion: 125 degrees. Ligament Stability and Special Test equivocal McMurrays test. Gait and function is as follows: mildly antalgic gait.

## 2023-02-23 NOTE — ED PROVIDER NOTE - ATTENDING CONTRIBUTION TO CARE
[place] [id number] [facility]  [phone]  [facility] RODRIGUE[facility]  [address]  PROGRESS NOTE        NAME: Judith Sheehan  AGE: 78 y o  SEX: female  :  1/10/1930  DATE OF ENCOUNTER: 2023    Chief Complaint   Patient seen and examined for follow up on chronic conditions  History of Present Illness     Judith Sheehan is a patient of [facility] with acute and chronic medical conditions ofhypothyroidism, hypertension, hyperlipidemia, CKD, hx of UTI, anemia, lower extremity edema, and ambulatory dysfunction        The patient is being seen and examined today by request of nursing after her nieces have reported that the patient has complained to them that she is in pain and have requested she be given something for her pain  Nursing has assessed the patient for pain upon multiple occassions and the patient denies pain  Upon examination, the patient is sitting in her wheelchair, alert, cooperative, and in no acute distress  She denies pain other than in her back while sitting in her wheelchair  A thorough musculoskeletal examination was performed with the assistance of RN Unit Manager, Antonio Higgins  PT/OT have been providing therapy treatment for restorative services  The following portions of the patient's history were reviewed and updated as appropriate: allergies, current medications, past family history, past medical history, past social history, past surgical history and problem list     Review of Systems     A 12-point comprehensive review of symptoms was obtained with pertinent positives and negatives noted per HPI      History     Past Medical History:   Diagnosis Date   • Acute encephalopathy 2022   • Aneurysm ([address] )     brain   • CKD (chronic kidney disease)    • Fall at nursing home 2022   • Frailty syndrome in geriatric patient 2022   • HLD (hyperlipidemia)    • HTN (hypertension)    • Hypothyroidism    • Impaired cognition 2023   • Midline back pain 2/25/2023     No past surgical history on file  No family history on file  Social History     Socioeconomic History   • Marital status: Single     Spouse name: Not on file   • Number of children: Not on file   • Years of education: Not on file   • Highest education level: Not on file   Occupational History   • Not on file   Tobacco Use   • Smoking status: Never   • Smokeless tobacco: Never   Vaping Use   • Vaping Use: Never used   Substance and Sexual Activity   • Alcohol use: Never   • Drug use: Never   • Sexual activity: Not on file   Other Topics Concern   • Not on file   Social History Narrative   • Not on file     Social Determinants of Health     Financial Resource Strain: Not on file   Food Insecurity: Not on file   Transportation Needs: No Transportation Needs   • Lack of Transportation (Medical): No   • Lack of Transportation (Non-Medical): No   Physical Activity: Not on file   Stress: Not on file   Social Connections: Not on file   Intimate Partner Violence: Not on file   Housing Stability: Not on file     No Known Allergies    Objective     Vital Signs  BP: 137/61       HR:77 T:97 1    RR:16 O2Sat:96% W:114 1  Physical Exam  Vitals reviewed  Constitutional:       Appearance: Normal appearance  HENT:      Head: Normocephalic  Musculoskeletal:         General: No swelling, tenderness, deformity or signs of injury  Normal range of motion  Right shoulder: Normal       Left shoulder: Normal       Cervical back: Normal, normal range of motion and neck supple  Thoracic back: Normal       Lumbar back: Normal       Right upper leg: Normal       Left upper leg: Normal       Right knee: Normal       Left knee: Normal       Right lower leg: Normal  No edema  Left lower leg: Normal  No edema        Right ankle: Normal       Right Achilles Tendon: Normal       Left ankle: Normal       Left Achilles Tendon: Normal       Comments: When patient asked if she had back pain she replied "yes" but upon palpation and ROM exercises the patient did not have any pain present   Neurological:      Motor: Weakness present  Coordination: Coordination abnormal       Gait: Gait abnormal        Pertinent Laboratory/Diagnostic Studies have been independently reviewed  Current Medications     Current Medications Reviewed and updated in Nursing Home EMR  Assessment and Plan     Midline back pain  · Patient seen by request of nursing for complaint by family that patient is having pain  · When patient asked if she is having pain she replied "no "  When aptient asked if she is having back pain she replied "yes "  · Full musculoskeletal examination performed that was wnl  · Other than gait abnormality and instability patient had normal ROM  · Will order lidocaine 4% patch on am and off pm to be applied for midline lower back to see if this helps with intermittent pain if this is what patient may be experiencing  · Continue Tylenol prn for pain  · Consult placed for physiatry Dr Bianca Tee to eval patient  · Continue to monitor patient for acute changes in condition      Ambulatory dysfunction  • Maintain fall and safety precautions  • Encourage use of call bell and asst devices  • Continue PT/OT services  • Assist with transfers, mobility, and ADLs      Frailty syndrome in geriatric patient  • Patient with multiple Comorbidities  • Patient continues to require 24/7 long-term care in a facility in which supportive services and ADLs can be provided  • Maintain fall and safety precautions  • Continue to monitor patient for decline      Impaired cognition  · BIMS assessment completed by LETICIA 2/22/23  · Patient scored 6/15, with severely impaired cognition result  · Will suggest to family that patient may benefit from further neuro/cog testing  · Continue to monitor for acute changes in condition        [facility]  Geriatric Medicine  2/22/2023 4 year old woman with PMH HLD, thyroid nodule s/p thyroidectomy 1999, osteoarthritis s/p spinal fusion , brain hemangioma s/p resection and on 9/23/19 had a midline craniotomy for tumor resection with a course complicated with delirium likely from UTI presents today for drainage from her surgical wound. Per patient she woke up with her pillow soaked with clear fluid, presumably from her scalp. did not see active drainage / bleeding. no area of blood on pillow. no trauma. taking medication as prescribed. called her neurosurgeon who sent her in for eval.  at  the bedside. patient states she had a mild headache last night which resolved without meds.   Surgeon Dr. Eric Blanc.  no acute distress  HEENT:  perrl eomi. pupils 3 mm reactive to light b/l, no facial asymmetry, scalp - surgical wound intact. no erythema no skin warm but is moist. no ttp. no bleeding  Lungs:  b/l bs  CVS: S1S2   Abd;  soft non tender   Ext: no edema or erythema  Neuro: aaox3 no focal deficits  MSK: strength 5/5 b/l upper and lower ext

## 2023-06-11 NOTE — PATIENT PROFILE ADULT - PRIMARY SOURCE OF SUPPORT/COMFORT
You can access the FollowMyHealth Patient Portal offered by [facility] by registering at the following website: [email or web address]. By joining [employer]’s FollowMyHealth portal, you will also be able to view your health information using other applications (apps) compatible with our system.
spouse

## 2023-06-14 ENCOUNTER — OUTPATIENT (OUTPATIENT)
Dept: OUTPATIENT SERVICES | Facility: HOSPITAL | Age: 68
LOS: 1 days | End: 2023-06-14
Payer: MEDICARE

## 2023-06-14 ENCOUNTER — APPOINTMENT (OUTPATIENT)
Dept: MRI IMAGING | Facility: IMAGING CENTER | Age: 68
End: 2023-06-14
Payer: MEDICARE

## 2023-06-14 ENCOUNTER — APPOINTMENT (OUTPATIENT)
Dept: NEUROSURGERY | Facility: CLINIC | Age: 68
End: 2023-06-14
Payer: MEDICARE

## 2023-06-14 VITALS
HEIGHT: 63 IN | WEIGHT: 160 LBS | SYSTOLIC BLOOD PRESSURE: 135 MMHG | TEMPERATURE: 98.1 F | RESPIRATION RATE: 16 BRPM | BODY MASS INDEX: 28.35 KG/M2 | OXYGEN SATURATION: 98 % | HEART RATE: 64 BPM | DIASTOLIC BLOOD PRESSURE: 79 MMHG

## 2023-06-14 DIAGNOSIS — Z98.890 OTHER SPECIFIED POSTPROCEDURAL STATES: Chronic | ICD-10-CM

## 2023-06-14 DIAGNOSIS — D32.9 BENIGN NEOPLASM OF MENINGES, UNSPECIFIED: ICD-10-CM

## 2023-06-14 DIAGNOSIS — Z98.2 PRESENCE OF CEREBROSPINAL FLUID DRAINAGE DEVICE: Chronic | ICD-10-CM

## 2023-06-14 DIAGNOSIS — Z98.1 ARTHRODESIS STATUS: Chronic | ICD-10-CM

## 2023-06-14 PROCEDURE — 70553 MRI BRAIN STEM W/O & W/DYE: CPT

## 2023-06-14 PROCEDURE — 70553 MRI BRAIN STEM W/O & W/DYE: CPT | Mod: 26,MH

## 2023-06-14 PROCEDURE — 99214 OFFICE O/P EST MOD 30 MIN: CPT

## 2023-06-14 PROCEDURE — A9585: CPT

## 2023-06-14 NOTE — ASSESSMENT
[FreeTextEntry1] : Plan: \par \par repeat MRI brain w/o in 1 year\par RTC at that time\par \par Patient seen, discussed, and examined with attending, Dr. Reyes\par Spent a total of 45 minutes with patient

## 2023-06-14 NOTE — HISTORY OF PRESENT ILLNESS
[FreeTextEntry1] : 66F s/p bifrontal meningioma resection in 2003, required second surgery for recurrence 9/2019. Her post-op course was complicated by wound healing issues / CSF leak that ultimately required a plastics tissue advancement and VPS. Her meningioma was not graded by Dr. Crump as he could not obtain her original slides but a Ki67 of 3-7% was noted. \par \par Happily, pt has no complaints. No HA, N/V, weakness/numbness, issues w/ executive function. She has moved into a shelter community in [place], [place]. \par  \par History of Present Illness\par Patient with complex surgical history:\par left central parasagittal meningioma resected 2003, no adjuvant therapy;\par re-resection for recurrence 09/2019 (Nava Grade I with excision SSS), \par postop CSF fistula, local wound revision 11/19, persistent CSF fistula; \par VPS right parietal and ultimately in 02/20 rotational scalp flap.\par \par MRI today grossly stable from prior\par \par exam: \par \par intact

## 2023-06-17 NOTE — BEHAVIORAL HEALTH ASSESSMENT NOTE - NS ED BHA MSE SPEECH SPONTANEITY
There was no emergent cause of your neck and back pain at today's visit. Please begin taking the meloxicam once a day for pain and anti-inflammatory qualities. Please follow-up with your primary care provider in the next few days. Present to the ER with any new or worsening symptoms. Increased latency

## 2024-06-18 ENCOUNTER — NON-APPOINTMENT (OUTPATIENT)
Age: 69
End: 2024-06-18

## 2024-06-19 ENCOUNTER — APPOINTMENT (OUTPATIENT)
Dept: MRI IMAGING | Facility: IMAGING CENTER | Age: 69
End: 2024-06-19
Payer: MEDICARE

## 2024-06-19 ENCOUNTER — APPOINTMENT (OUTPATIENT)
Dept: NEUROSURGERY | Facility: CLINIC | Age: 69
End: 2024-06-19
Payer: MEDICARE

## 2024-06-19 ENCOUNTER — OUTPATIENT (OUTPATIENT)
Dept: OUTPATIENT SERVICES | Facility: HOSPITAL | Age: 69
LOS: 1 days | End: 2024-06-19
Payer: MEDICARE

## 2024-06-19 VITALS
RESPIRATION RATE: 16 BRPM | WEIGHT: 168 LBS | OXYGEN SATURATION: 97 % | HEART RATE: 70 BPM | SYSTOLIC BLOOD PRESSURE: 146 MMHG | DIASTOLIC BLOOD PRESSURE: 79 MMHG | HEIGHT: 63 IN | BODY MASS INDEX: 29.77 KG/M2

## 2024-06-19 DIAGNOSIS — Z98.1 ARTHRODESIS STATUS: Chronic | ICD-10-CM

## 2024-06-19 DIAGNOSIS — Z98.890 OTHER SPECIFIED POSTPROCEDURAL STATES: Chronic | ICD-10-CM

## 2024-06-19 DIAGNOSIS — Z98.2 PRESENCE OF CEREBROSPINAL FLUID DRAINAGE DEVICE: Chronic | ICD-10-CM

## 2024-06-19 DIAGNOSIS — D32.9 BENIGN NEOPLASM OF MENINGES, UNSPECIFIED: ICD-10-CM

## 2024-06-19 PROCEDURE — 70551 MRI BRAIN STEM W/O DYE: CPT | Mod: 26,MH

## 2024-06-19 PROCEDURE — 70551 MRI BRAIN STEM W/O DYE: CPT

## 2024-06-19 PROCEDURE — 99213 OFFICE O/P EST LOW 20 MIN: CPT

## 2024-06-19 RX ORDER — CEPHALEXIN 500 MG/1
500 CAPSULE ORAL 4 TIMES DAILY
Qty: 8 | Refills: 0 | Status: DISCONTINUED | COMMUNITY
Start: 2020-03-03 | End: 2024-06-19

## 2024-06-19 RX ORDER — MUPIROCIN 20 MG/G
2 OINTMENT TOPICAL TWICE DAILY
Qty: 1 | Refills: 4 | Status: DISCONTINUED | COMMUNITY
Start: 2020-01-28 | End: 2024-06-19

## 2024-06-19 RX ORDER — APIXABAN 2.5 MG/1
2.5 TABLET, FILM COATED ORAL
Refills: 0 | Status: ACTIVE | COMMUNITY

## 2024-06-19 RX ORDER — CIPROFLOXACIN HYDROCHLORIDE 500 MG/1
500 TABLET, FILM COATED ORAL
Qty: 28 | Refills: 0 | Status: DISCONTINUED | COMMUNITY
Start: 2019-10-29 | End: 2024-06-19

## 2024-06-20 NOTE — ASU PREOP CHECKLIST - ADVANCE DIRECTIVE ADDRESSED/READDRESSED
Hpi Title: Evaluation of Skin Lesions Additional History: She is concerned with a lesion on her nose done

## 2025-04-21 ENCOUNTER — NON-APPOINTMENT (OUTPATIENT)
Age: 70
End: 2025-04-21

## 2025-05-14 ENCOUNTER — APPOINTMENT (OUTPATIENT)
Dept: NEUROSURGERY | Facility: CLINIC | Age: 70
End: 2025-05-14
Payer: MEDICARE

## 2025-05-14 ENCOUNTER — NON-APPOINTMENT (OUTPATIENT)
Age: 70
End: 2025-05-14

## 2025-05-14 ENCOUNTER — APPOINTMENT (OUTPATIENT)
Dept: MRI IMAGING | Facility: IMAGING CENTER | Age: 70
End: 2025-05-14

## 2025-05-14 ENCOUNTER — OUTPATIENT (OUTPATIENT)
Dept: OUTPATIENT SERVICES | Facility: HOSPITAL | Age: 70
LOS: 1 days | End: 2025-05-14
Payer: MEDICARE

## 2025-05-14 VITALS
TEMPERATURE: 98.4 F | BODY MASS INDEX: 28.35 KG/M2 | DIASTOLIC BLOOD PRESSURE: 80 MMHG | HEART RATE: 72 BPM | SYSTOLIC BLOOD PRESSURE: 140 MMHG | RESPIRATION RATE: 16 BRPM | HEIGHT: 63 IN | OXYGEN SATURATION: 98 % | WEIGHT: 160 LBS

## 2025-05-14 DIAGNOSIS — Z98.890 OTHER SPECIFIED POSTPROCEDURAL STATES: Chronic | ICD-10-CM

## 2025-05-14 DIAGNOSIS — Z98.1 ARTHRODESIS STATUS: Chronic | ICD-10-CM

## 2025-05-14 DIAGNOSIS — D32.0 BENIGN NEOPLASM OF CEREBRAL MENINGES: ICD-10-CM

## 2025-05-14 DIAGNOSIS — Z98.2 PRESENCE OF CEREBROSPINAL FLUID DRAINAGE DEVICE: Chronic | ICD-10-CM

## 2025-05-14 PROCEDURE — 70551 MRI BRAIN STEM W/O DYE: CPT | Mod: 26

## 2025-05-14 PROCEDURE — 99214 OFFICE O/P EST MOD 30 MIN: CPT

## 2025-05-14 PROCEDURE — 70551 MRI BRAIN STEM W/O DYE: CPT

## 2025-05-14 RX ORDER — GABAPENTIN 300 MG
300 TABLET ORAL
Refills: 0 | Status: ACTIVE | COMMUNITY

## 2025-06-18 ENCOUNTER — APPOINTMENT (OUTPATIENT)
Dept: MRI IMAGING | Facility: IMAGING CENTER | Age: 70
End: 2025-06-18

## 2025-07-16 NOTE — PHYSICAL THERAPY INITIAL EVALUATION ADULT - DISCHARGE PLANNER MADE AWARE
Detail Level: Detailed Render Post-Care Instructions In Note?: no Post-Care Instructions: I reviewed with the patient in detail post-care instructions. Patient is to wear sunprotection, and avoid picking at any of the treated lesions. Pt may apply Vaseline to crusted or scabbing areas. Show Applicator Variable?: Yes Number Of Freeze-Thaw Cycles: 1 freeze-thaw cycle Duration Of Freeze Thaw-Cycle (Seconds): 3 Consent: The patient's consent was obtained including but not limited to risks of crusting, scabbing, blistering, scarring, darker or lighter pigmentary change, recurrence, incomplete removal and infection. No yes